# Patient Record
Sex: FEMALE | Race: WHITE | NOT HISPANIC OR LATINO | Employment: FULL TIME | URBAN - METROPOLITAN AREA
[De-identification: names, ages, dates, MRNs, and addresses within clinical notes are randomized per-mention and may not be internally consistent; named-entity substitution may affect disease eponyms.]

---

## 2017-05-02 ENCOUNTER — DOCUMENTATION ONLY (OUTPATIENT)
Dept: FAMILY MEDICINE | Facility: CLINIC | Age: 52
End: 2017-05-02

## 2017-05-02 NOTE — PROGRESS NOTES
Pre-Visit Chart Review  For Appointment Scheduled on 5/5/17.    Health Maintenance Due   Topic Date Due    Hepatitis C Screening  1965    TETANUS VACCINE  10/22/1983    Pap Smear  10/22/1986    Mammogram  10/22/2005    Colonoscopy  10/22/2015

## 2017-05-05 ENCOUNTER — OFFICE VISIT (OUTPATIENT)
Dept: FAMILY MEDICINE | Facility: CLINIC | Age: 52
End: 2017-05-05
Payer: COMMERCIAL

## 2017-05-05 VITALS
HEART RATE: 87 BPM | WEIGHT: 199.31 LBS | TEMPERATURE: 99 F | BODY MASS INDEX: 33.21 KG/M2 | DIASTOLIC BLOOD PRESSURE: 76 MMHG | HEIGHT: 65 IN | SYSTOLIC BLOOD PRESSURE: 131 MMHG

## 2017-05-05 DIAGNOSIS — R63.5 WEIGHT GAIN: ICD-10-CM

## 2017-05-05 DIAGNOSIS — I67.1 BRAIN ANEURYSM: Primary | ICD-10-CM

## 2017-05-05 DIAGNOSIS — R94.6 THYROID FUNCTION STUDY ABNORMALITY: ICD-10-CM

## 2017-05-05 DIAGNOSIS — G89.29 CHRONIC NONINTRACTABLE HEADACHE, UNSPECIFIED HEADACHE TYPE: ICD-10-CM

## 2017-05-05 DIAGNOSIS — R51.9 CHRONIC NONINTRACTABLE HEADACHE, UNSPECIFIED HEADACHE TYPE: ICD-10-CM

## 2017-05-05 DIAGNOSIS — E66.9 OBESITY, CLASS I, BMI 30-34.9: ICD-10-CM

## 2017-05-05 PROBLEM — Z82.49 FAMILY HISTORY OF PREMATURE CAD: Status: ACTIVE | Noted: 2017-05-05

## 2017-05-05 PROBLEM — M51.36 DDD (DEGENERATIVE DISC DISEASE), LUMBAR: Status: ACTIVE | Noted: 2017-05-05

## 2017-05-05 PROCEDURE — 99204 OFFICE O/P NEW MOD 45 MIN: CPT | Mod: S$GLB,,, | Performed by: FAMILY MEDICINE

## 2017-05-05 PROCEDURE — 99999 PR PBB SHADOW E&M-EST. PATIENT-LVL V: CPT | Mod: PBBFAC,,, | Performed by: FAMILY MEDICINE

## 2017-05-05 PROCEDURE — 1160F RVW MEDS BY RX/DR IN RCRD: CPT | Mod: S$GLB,,, | Performed by: FAMILY MEDICINE

## 2017-05-05 RX ORDER — CETIRIZINE HYDROCHLORIDE 10 MG/1
10 TABLET ORAL DAILY
COMMUNITY

## 2017-05-05 RX ORDER — IBUPROFEN 200 MG
600 TABLET ORAL 2 TIMES DAILY PRN
Status: ON HOLD | COMMUNITY
End: 2019-01-05 | Stop reason: HOSPADM

## 2017-05-05 NOTE — MR AVS SNAPSHOT
[facility]  [address]  [place] [address]  Phone: [phone]  Fax: [phone]                  Marsha Khalil   2017 2:00 PM   Office Visit    Description:  Female : 1965   Provider:  Lovely Garza MD   Department:  [facility]           Reason for Visit     Establish Care           Diagnoses this Visit        Comments    Brain aneurysm    -  Primary     Chronic nonintractable headache, unspecified headache type         Weight gain         ALMA (obstructive sleep apnea)         Thyroid function study abnormality         Family history of premature CAD                To Do List           Future Appointments        Provider Department Dept Phone    2017 4:30 PM [facility] US1 [facility] Ultrasound [phone]    5/10/2017 8:30 AM LAB, [facility] SAT [facility] Lab [phone]    2017 3:30 PM [facility] MRI1 300 LB LIMIT [facility] [phone]    2017 4:30 PM [facility] MRI1 300 LB LIMIT [facility] [phone]    10/20/2017 1:00 PM Nathan Phillips MD [facility] Endo/Diabetes [phone]      Goals (5 Years of Data)     None      [facility]     [facility] Nurse Care Line - 24/7 Assistance  Unless otherwise directed by your provider, please contact [facility], our nurse care line that is available for /7 assistance.     Registered nurses in the [facility] provide: appointment scheduling, clinical advisement, health education, and other advisory services.  Call: [phone] (toll free)               Medications           Message regarding Medications     Verify the changes and/or additions to your medication regime listed below are the same as discussed with your clinician today.  If any of these changes or additions are incorrect, please notify your healthcare provider.             Verify that the below list of medications is an accurate representation of the medications you  "are currently taking.  If none reported, the list may be blank. If incorrect, please contact your healthcare provider. Carry this list with you in case of emergency.           Current Medications     cetirizine (ZYRTEC) 10 MG tablet Take 10 mg by mouth once daily.    ibuprofen (ADVIL,MOTRIN) 200 MG tablet Take 600 mg by mouth 2 (two) times daily as needed for Pain.           Clinical Reference Information           Your Vitals Were     BP Pulse Temp Height Weight BMI    131/76 (BP Location: Right arm, Patient Position: Sitting, BP Method: Automatic) 87 98.6 °F (37 °C) (Oral) 5' 4.5" (1.638 m) 90.4 kg (199 lb 4.7 oz) 33.68 kg/m2      Blood Pressure          Most Recent Value    BP  131/76      Allergies as of 5/5/2017     No Known Allergies      Immunizations Administered on Date of Encounter - 5/5/2017     None      Orders Placed During Today's Visit      Normal Orders This Visit    Ambulatory referral to Endocrinology     Ambulatory referral to Neurology     Future Labs/Procedures Expected by Expires    Anti-thyroglobulin antibody  5/5/2017 7/4/2018    CBC auto differential  5/5/2017 7/4/2018    Comprehensive metabolic panel  5/5/2017 7/4/2018    Lipid panel  5/5/2017 7/4/2018    MRA Brain  5/5/2017 5/5/2018    MRI Brain W WO Contrast  5/5/2017 5/5/2018    T3  5/5/2017 7/4/2018    T4, free  5/5/2017 7/4/2018    Thyroglobulin  5/5/2017 7/4/2018    Thyroid peroxidase antibody  5/5/2017 7/4/2018    Thyroid stimulating immunoglobulin  5/5/2017 7/4/2018    TSH  5/5/2017 7/4/2018    US Soft Tissue Head Neck Thyroid  5/5/2017 5/6/2018      Smoking Cessation     If you would like to quit smoking:   You may be eligible for free services if you are a [place] resident and started smoking cigarettes before September 1, 1988.  Call the Smoking Cessation Trust (SCT) toll free at (663) 301-4418 or (342) 515-4060.   Call 2-800-QUIT-NOW if you do not meet the above criteria.   Contact us via email: " [email or web address]   View our website for more information: [email or web address]        Language Assistance Services     ATTENTION: Language assistance services are available, free of charge. Please call [phone].      ATENCIÓN: Si antoinette hernandez, tiene a damian disposición servicios gratuitos de asistencia lingüística. Llame al [phone].     CHÚ Ý: N?u b?n nói Ti?ng Vi?t, có các d?ch v? h? tr? ngôn ng? mi?n phí dành cho b?n. G?i s? [phone].         [facility] complies with applicable Federal civil rights laws and does not discriminate on the basis of race, color, national origin, age, disability, or sex.

## 2017-05-08 ENCOUNTER — HOSPITAL ENCOUNTER (OUTPATIENT)
Dept: RADIOLOGY | Facility: CLINIC | Age: 52
Discharge: HOME | End: 2017-05-08
Attending: FAMILY MEDICINE
Payer: COMMERCIAL

## 2017-05-08 DIAGNOSIS — R94.6 THYROID FUNCTION STUDY ABNORMALITY: ICD-10-CM

## 2017-05-08 PROCEDURE — 76536 US EXAM OF HEAD AND NECK: CPT | Mod: 26,,, | Performed by: RADIOLOGY

## 2017-05-08 PROCEDURE — 76536 US EXAM OF HEAD AND NECK: CPT | Mod: TC,PO

## 2017-05-08 NOTE — PROGRESS NOTES
CHIEF COMPLAINT:  Establish care      HISTORY OF PRESENT ILLNESS:  Marsha Khalil is a 51 y.o. female who presents to clinic as a new patient to me to establish care. She recently moved to the area from [place]. She states that she was undergoing evaluation of chronic headaches and a brain MRI/MRA demonstrated a 1 mm aneurysm. She was supposed to have follow up imaging in 6 months but this did not occur with her move. She continues to have headaches and had been on different prescription migraine medications with no relief. She also stats that she has a thyroid condition but that her TSH is always normal. She has not followed up with endocrinology. She also continues to have weight gain despite eating a healthy diet, getting regular exercise.       REVIEW OF SYSTEMS:  The patient denies any fever, chills, night sweats, vision changes, difficulty speaking or swallowing, decreased hearing, , chest pain, palpitations, shortness of breath, cough, nausea, vomiting, abdominal pain, dysuria, diarrhea, constipation, hematuria, hematochezia, melena, changes in her hair, skin, nails, numbness or weakness in her extremities, erythema, pain or swelling over any of her joints, myalgia, swollen glands, easy bruising, fatigue, edema, symptoms of anxiety or depression. She denies any vaginal discharge, breast masses, nipple discharge, change in the skin overlying her breasts.      MEDICATIONS:   Reviewed and/or reconciled in EPIC    ALLERGIES:  Reviewed and/or reconciled in [facility]    PAST MEDICAL/SURGICAL HISTORY:   Past Medical History:   Diagnosis Date    Allergy     Chest pain     negative stress test and heart cath    Headache     ALMA (obstructive sleep apnea)     Seasonal allergies       Past Surgical History:   Procedure Laterality Date    abscess removal from right side of neck      APPENDECTOMY      BREAST SURGERY Left     lumpectomy, benign    CARDIAC CATHETERIZATION      part of evaluation for chest pain,  "negative     SECTION      x1    ENDOMETRIAL ABLATION      TUBAL LIGATION         FAMILY HISTORY:    Family History   Problem Relation Age of Onset    Multiple sclerosis Father     Diabetes Father     Heart disease Father 50     CAD    Diabetes Mother     Cancer Mother      breast    Heart disease Mother 50     CAD    Thyroid disease Daughter      hypothyroidism    Transient ischemic attack Sister        SOCIAL HISTORY:    Social History     Social History    Marital status: Single     Spouse name: N/A    Number of children: N/A    Years of education: N/A     Occupational History    Not on file.     Social History Main Topics    Smoking status: Current Every Day Smoker     Packs/day: 1.00     Years: 30.00     Types: Cigarettes    Smokeless tobacco: Never Used    Alcohol use No    Drug use: No    Sexual activity: Yes     Partners: Male     Other Topics Concern    Not on file     Social History Narrative    No narrative on file       PHYSICAL EXAM:  VITAL SIGNS:   Vitals:    17 1415   BP: 131/76   BP Location: Right arm   Patient Position: Sitting   BP Method: Automatic   Pulse: 87   Temp: 98.6 °F (37 °C)   TempSrc: Oral   Weight: 90.4 kg (199 lb 4.7 oz)   Height: 5' 4.5" (1.638 m)     GENERAL:  Patient appears well nourished, sitting on exam table, in no acute distress.  HEENT:  Atraumatic, normocephalic, PERRLA, EOMI, no conjunctival injection, sclerae are anicteric, normal external auditory canals,TMs clear b/l, gross hearing intact to whisper, MMM, no oropharygneal erythema or exudate.  NECK:  Supple, normal ROM, trachea is midline , no supraclavicular or cervical LAD or masses palpated.  Thyroid gland not palpable.  CARDIOVASCULAR:  RRR, normal S1 and S2, no m/r/g.  RESPIRATORY:  CTA b/l, no wheezes, rhonchi, rales.  No increased work of breathing, no  use of accessory muscles.  ABDOMEN:  Soft, nontender, nondistended, normoactive bowel sounds in all four quadrants, no rebound " or guarding, no HSM or masses palpated.  Normal percussion.  EXTREMITIES:  2+ DP pulses b/l, no edema.  SKIN:  Warm, no lesions on exposed skin.  NEUROMUSCULAR:  Cranial nerves II-XII grossly intact.  Strength is 4+/5 over upper and lower extremity flexors/extensors b/l, 2+ biceps and patellar reflexes b/l. No clubbing or cyanosis of digits/nails.  Steady gait.  PSYCH:  Patient is alert and oriented to person, time, place. They are appropriately dressed and groomed. There is normal eye contact. Rate and tone of speech is normal. Normal insight, judgement. Normal thought content and process.     LABORATORY/IMAGING STUDIES: pending     ASSESSMENT/PLAN: This is a 51 y.o. female who presents to clinic for evaluation of the following concerns    1. Brain aneurysm  - MRI Brain W WO Contrast; Future  - MRA Brain; Future    2. Chronic nonintractable headache, unspecified headache type  - Ambulatory referral to Neurology    3. Weight gain  - CBC auto differential; Future  - Comprehensive metabolic panel; Future  - Lipid panel; Future  - TSH; Future  - Ambulatory referral to Endocrinology    4. Thyroid function study abnormality  - Ambulatory referral to Endocrinology  - T3; Future  - T4, free; Future  - Thyroglobulin; Future  - Anti-thyroglobulin antibody; Future  - Thyroid peroxidase antibody; Future  - Thyroid stimulating immunoglobulin; Future  - US Soft Tissue Head Neck Thyroid; Future    5. Obesity: see below      Patient readiness: acceptance and barriers:none    During the course of the visit the patient was educated and counseled about the following:     Obesity:   Refer to endocrinology    Goals: Obesity: Reduce calorie intake and BMI    Did patient meet goals/outcomes: No    The following self management tools provided: declined    Patient Instructions (the written plan) was given to the patient/family.     Time spent with patient: 30 minutes    Lovely Garza MD

## 2017-05-10 ENCOUNTER — LAB VISIT (OUTPATIENT)
Dept: LAB | Facility: HOSPITAL | Age: 52
End: 2017-05-10
Attending: FAMILY MEDICINE
Payer: COMMERCIAL

## 2017-05-10 DIAGNOSIS — R63.5 WEIGHT GAIN: ICD-10-CM

## 2017-05-10 DIAGNOSIS — R94.6 THYROID FUNCTION STUDY ABNORMALITY: ICD-10-CM

## 2017-05-10 LAB
ALBUMIN SERPL BCP-MCNC: 3.7 G/DL
ALP SERPL-CCNC: 56 U/L
ALT SERPL W/O P-5'-P-CCNC: 11 U/L
ANION GAP SERPL CALC-SCNC: 9 MMOL/L
AST SERPL-CCNC: 11 U/L
BASOPHILS # BLD AUTO: 0.03 K/UL
BASOPHILS NFR BLD: 0.3 %
BILIRUB SERPL-MCNC: 0.6 MG/DL
BUN SERPL-MCNC: 8 MG/DL
CALCIUM SERPL-MCNC: 9.5 MG/DL
CHLORIDE SERPL-SCNC: 107 MMOL/L
CHOLEST/HDLC SERPL: 5.3 {RATIO}
CO2 SERPL-SCNC: 22 MMOL/L
CREAT SERPL-MCNC: 0.7 MG/DL
DIFFERENTIAL METHOD: NORMAL
EOSINOPHIL # BLD AUTO: 0.2 K/UL
EOSINOPHIL NFR BLD: 1.7 %
ERYTHROCYTE [DISTWIDTH] IN BLOOD BY AUTOMATED COUNT: 13.7 %
EST. GFR  (AFRICAN AMERICAN): >60 ML/MIN/1.73 M^2
EST. GFR  (NON AFRICAN AMERICAN): >60 ML/MIN/1.73 M^2
GLUCOSE SERPL-MCNC: 91 MG/DL
HCT VFR BLD AUTO: 44.3 %
HDL/CHOLESTEROL RATIO: 18.8 %
HDLC SERPL-MCNC: 192 MG/DL
HDLC SERPL-MCNC: 36 MG/DL
HGB BLD-MCNC: 14.8 G/DL
LDLC SERPL CALC-MCNC: 138.2 MG/DL
LYMPHOCYTES # BLD AUTO: 4.2 K/UL
LYMPHOCYTES NFR BLD: 35.7 %
MCH RBC QN AUTO: 30.6 PG
MCHC RBC AUTO-ENTMCNC: 33.4 %
MCV RBC AUTO: 92 FL
MONOCYTES # BLD AUTO: 0.6 K/UL
MONOCYTES NFR BLD: 5.5 %
NEUTROPHILS # BLD AUTO: 6.6 K/UL
NEUTROPHILS NFR BLD: 56.5 %
NONHDLC SERPL-MCNC: 156 MG/DL
PLATELET # BLD AUTO: 322 K/UL
PMV BLD AUTO: 11 FL
POTASSIUM SERPL-SCNC: 4.2 MMOL/L
PROT SERPL-MCNC: 6.7 G/DL
RBC # BLD AUTO: 4.83 M/UL
SODIUM SERPL-SCNC: 138 MMOL/L
T3 SERPL-MCNC: 91 NG/DL
T4 FREE SERPL-MCNC: 1.07 NG/DL
THYROGLOB AB SERPL IA-ACNC: <4 IU/ML
THYROPEROXIDASE IGG SERPL-ACNC: <6 IU/ML
TRIGL SERPL-MCNC: 89 MG/DL
TSH SERPL DL<=0.005 MIU/L-ACNC: 0.83 UIU/ML
WBC # BLD AUTO: 11.65 K/UL

## 2017-05-10 PROCEDURE — 84443 ASSAY THYROID STIM HORMONE: CPT

## 2017-05-10 PROCEDURE — 85025 COMPLETE CBC W/AUTO DIFF WBC: CPT

## 2017-05-10 PROCEDURE — 84445 ASSAY OF TSI GLOBULIN: CPT

## 2017-05-10 PROCEDURE — 80053 COMPREHEN METABOLIC PANEL: CPT

## 2017-05-10 PROCEDURE — 80061 LIPID PANEL: CPT

## 2017-05-10 PROCEDURE — 84480 ASSAY TRIIODOTHYRONINE (T3): CPT

## 2017-05-10 PROCEDURE — 84439 ASSAY OF FREE THYROXINE: CPT

## 2017-05-10 PROCEDURE — 86800 THYROGLOBULIN ANTIBODY: CPT | Mod: 91

## 2017-05-10 PROCEDURE — 86800 THYROGLOBULIN ANTIBODY: CPT

## 2017-05-10 PROCEDURE — 86376 MICROSOMAL ANTIBODY EACH: CPT

## 2017-05-11 DIAGNOSIS — Z12.31 OTHER SCREENING MAMMOGRAM: ICD-10-CM

## 2017-05-11 DIAGNOSIS — Z11.59 NEED FOR HEPATITIS C SCREENING TEST: ICD-10-CM

## 2017-05-11 LAB
THRYOGLOBULIN INTERPRETATION: ABNORMAL
THYROGLOB AB SERPL-ACNC: <1.8 IU/ML
THYROGLOB SERPL-MCNC: 11 NG/ML

## 2017-05-12 ENCOUNTER — HOSPITAL ENCOUNTER (OUTPATIENT)
Dept: RADIOLOGY | Facility: HOSPITAL | Age: 52
Discharge: HOME | End: 2017-05-12
Attending: FAMILY MEDICINE
Payer: COMMERCIAL

## 2017-05-12 DIAGNOSIS — I67.1 BRAIN ANEURYSM: ICD-10-CM

## 2017-05-12 LAB — TSI SER-ACNC: <0.1 IU/L

## 2017-05-12 PROCEDURE — 70553 MRI BRAIN STEM W/O & W/DYE: CPT | Mod: 26,,, | Performed by: RADIOLOGY

## 2017-05-12 PROCEDURE — 25500020 PHARM REV CODE 255: Performed by: FAMILY MEDICINE

## 2017-05-12 PROCEDURE — A9585 GADOBUTROL INJECTION: HCPCS | Performed by: FAMILY MEDICINE

## 2017-05-12 PROCEDURE — 70544 MR ANGIOGRAPHY HEAD W/O DYE: CPT | Mod: 26,,, | Performed by: RADIOLOGY

## 2017-05-12 PROCEDURE — 70553 MRI BRAIN STEM W/O & W/DYE: CPT | Mod: TC

## 2017-05-12 PROCEDURE — 70544 MR ANGIOGRAPHY HEAD W/O DYE: CPT | Mod: TC

## 2017-05-12 RX ORDER — GADOBUTROL 604.72 MG/ML
INJECTION INTRAVENOUS
Status: DISCONTINUED
Start: 2017-05-12 | End: 2017-05-13 | Stop reason: HOSPADM

## 2017-05-12 RX ORDER — GADOBUTROL 604.72 MG/ML
9 INJECTION INTRAVENOUS
Status: COMPLETED | OUTPATIENT
Start: 2017-05-12 | End: 2017-05-12

## 2017-05-12 RX ADMIN — GADOBUTROL 9 ML: 604.72 INJECTION INTRAVENOUS at 04:05

## 2017-05-23 ENCOUNTER — PATIENT MESSAGE (OUTPATIENT)
Dept: FAMILY MEDICINE | Facility: CLINIC | Age: 52
End: 2017-05-23

## 2017-05-23 DIAGNOSIS — E04.2 MULTIPLE THYROID NODULES: ICD-10-CM

## 2017-05-23 NOTE — TELEPHONE ENCOUNTER
Her lab work including thyroid studies was normal. The thyroid ultrasound demonstrated an enlarged thyroid gland with multiple nodules none of which need to be biopsied at this time. She needs to keep her appointment with Dr. Phillips.    Her brain MRI/MRA did not demonstrate any aneurysm, there are some changes that can be seen due to age, cholesterol deposits but no other abnormalities. Should try to eat low fat diet, but does not need to be on cholesterol medication at this time.    She has an appointment with Dr. Fischer in August, is there a sooner appointment? Does she want to see someone at [facility] or Dr. Hilario, Dr. Howard's group?

## 2017-07-04 ENCOUNTER — HOSPITAL ENCOUNTER (EMERGENCY)
Facility: HOSPITAL | Age: 52
Discharge: HOME | End: 2017-07-04
Attending: EMERGENCY MEDICINE
Payer: COMMERCIAL

## 2017-07-04 VITALS
HEART RATE: 89 BPM | SYSTOLIC BLOOD PRESSURE: 121 MMHG | RESPIRATION RATE: 22 BRPM | TEMPERATURE: 97 F | WEIGHT: 190 LBS | BODY MASS INDEX: 31.65 KG/M2 | OXYGEN SATURATION: 96 % | DIASTOLIC BLOOD PRESSURE: 56 MMHG | HEIGHT: 65 IN

## 2017-07-04 DIAGNOSIS — N23 RENAL COLIC: Primary | ICD-10-CM

## 2017-07-04 LAB
ALBUMIN SERPL BCP-MCNC: 3.9 G/DL
ALP SERPL-CCNC: 51 U/L
ALT SERPL W/O P-5'-P-CCNC: 14 U/L
ANION GAP SERPL CALC-SCNC: 9 MMOL/L
AST SERPL-CCNC: 14 U/L
BASOPHILS # BLD AUTO: 0 K/UL
BASOPHILS NFR BLD: 0.3 %
BILIRUB SERPL-MCNC: 0.5 MG/DL
BILIRUB UR QL STRIP: NEGATIVE
BUN SERPL-MCNC: 14 MG/DL
CALCIUM SERPL-MCNC: 9.4 MG/DL
CHLORIDE SERPL-SCNC: 107 MMOL/L
CLARITY UR: CLEAR
CO2 SERPL-SCNC: 22 MMOL/L
COLOR UR: YELLOW
CREAT SERPL-MCNC: 0.8 MG/DL
DIFFERENTIAL METHOD: ABNORMAL
EOSINOPHIL # BLD AUTO: 0.2 K/UL
EOSINOPHIL NFR BLD: 1.2 %
ERYTHROCYTE [DISTWIDTH] IN BLOOD BY AUTOMATED COUNT: 13.1 %
EST. GFR  (AFRICAN AMERICAN): >60 ML/MIN/1.73 M^2
EST. GFR  (NON AFRICAN AMERICAN): >60 ML/MIN/1.73 M^2
GLUCOSE SERPL-MCNC: 130 MG/DL
GLUCOSE UR QL STRIP: NEGATIVE
HCT VFR BLD AUTO: 41.5 %
HGB BLD-MCNC: 14.5 G/DL
HGB UR QL STRIP: NEGATIVE
KETONES UR QL STRIP: NEGATIVE
LEUKOCYTE ESTERASE UR QL STRIP: NEGATIVE
LYMPHOCYTES # BLD AUTO: 4.2 K/UL
LYMPHOCYTES NFR BLD: 28.4 %
MCH RBC QN AUTO: 31.3 PG
MCHC RBC AUTO-ENTMCNC: 34.8 %
MCV RBC AUTO: 90 FL
MONOCYTES # BLD AUTO: 0.7 K/UL
MONOCYTES NFR BLD: 4.9 %
NEUTROPHILS # BLD AUTO: 9.8 K/UL
NEUTROPHILS NFR BLD: 65.2 %
NITRITE UR QL STRIP: NEGATIVE
PH UR STRIP: 6 [PH] (ref 5–8)
PLATELET # BLD AUTO: 342 K/UL
PMV BLD AUTO: 7.9 FL
POTASSIUM SERPL-SCNC: 3.9 MMOL/L
PROT SERPL-MCNC: 6.9 G/DL
PROT UR QL STRIP: NEGATIVE
RBC # BLD AUTO: 4.63 M/UL
SODIUM SERPL-SCNC: 138 MMOL/L
SP GR UR STRIP: 1.02 (ref 1–1.03)
URN SPEC COLLECT METH UR: NORMAL
UROBILINOGEN UR STRIP-ACNC: NEGATIVE EU/DL
WBC # BLD AUTO: 14.9 K/UL

## 2017-07-04 PROCEDURE — 99285 EMERGENCY DEPT VISIT HI MDM: CPT | Mod: 25

## 2017-07-04 PROCEDURE — 36415 COLL VENOUS BLD VENIPUNCTURE: CPT

## 2017-07-04 PROCEDURE — 81003 URINALYSIS AUTO W/O SCOPE: CPT

## 2017-07-04 PROCEDURE — 96375 TX/PRO/DX INJ NEW DRUG ADDON: CPT

## 2017-07-04 PROCEDURE — 96361 HYDRATE IV INFUSION ADD-ON: CPT

## 2017-07-04 PROCEDURE — 85025 COMPLETE CBC W/AUTO DIFF WBC: CPT

## 2017-07-04 PROCEDURE — 80053 COMPREHEN METABOLIC PANEL: CPT

## 2017-07-04 PROCEDURE — 63600175 PHARM REV CODE 636 W HCPCS: Performed by: EMERGENCY MEDICINE

## 2017-07-04 PROCEDURE — 96374 THER/PROPH/DIAG INJ IV PUSH: CPT

## 2017-07-04 PROCEDURE — 25000003 PHARM REV CODE 250: Performed by: EMERGENCY MEDICINE

## 2017-07-04 RX ORDER — MORPHINE SULFATE 4 MG/ML
8 INJECTION, SOLUTION INTRAMUSCULAR; INTRAVENOUS
Status: COMPLETED | OUTPATIENT
Start: 2017-07-04 | End: 2017-07-04

## 2017-07-04 RX ORDER — KETOROLAC TROMETHAMINE 30 MG/ML
15 INJECTION, SOLUTION INTRAMUSCULAR; INTRAVENOUS
Status: COMPLETED | OUTPATIENT
Start: 2017-07-04 | End: 2017-07-04

## 2017-07-04 RX ORDER — HYDROCODONE BITARTRATE AND ACETAMINOPHEN 5; 325 MG/1; MG/1
1 TABLET ORAL EVERY 4 HOURS PRN
Qty: 8 TABLET | Refills: 0 | Status: SHIPPED | OUTPATIENT
Start: 2017-07-04 | End: 2017-07-14

## 2017-07-04 RX ORDER — ONDANSETRON 4 MG/1
4 TABLET, ORALLY DISINTEGRATING ORAL EVERY 8 HOURS PRN
Qty: 12 TABLET | Refills: 0 | Status: SHIPPED | OUTPATIENT
Start: 2017-07-04 | End: 2017-08-29

## 2017-07-04 RX ORDER — KETOROLAC TROMETHAMINE 10 MG/1
10 TABLET, FILM COATED ORAL 3 TIMES DAILY PRN
Qty: 12 TABLET | Refills: 0 | Status: SHIPPED | OUTPATIENT
Start: 2017-07-04 | End: 2017-08-29

## 2017-07-04 RX ADMIN — SODIUM CHLORIDE 1000 ML: 0.9 INJECTION, SOLUTION INTRAVENOUS at 01:07

## 2017-07-04 RX ADMIN — MORPHINE SULFATE 8 MG: 4 INJECTION INTRAVENOUS at 01:07

## 2017-07-04 RX ADMIN — KETOROLAC TROMETHAMINE 15 MG: 30 INJECTION, SOLUTION INTRAMUSCULAR at 03:07

## 2017-07-04 NOTE — ED PROVIDER NOTES
Encounter Date: 2017       History     Chief Complaint   Patient presents with    Flank Pain     EMS admin 4mg zofran and establishe 18g IV     51-year-old female medical history of an endometrial ablation and appendectomy presents with 1 hour of sudden onset left flank pain radiating into the left groin with nausea and vomiting.  No right-sided pain.  No aggravating or alleviating factors, EMS has given the patient Zofran.          Review of patient's allergies indicates:  No Known Allergies  Past Medical History:   Diagnosis Date    Allergy     Chest pain     negative stress test and heart cath    Headache     ALMA (obstructive sleep apnea)     Seasonal allergies      Past Surgical History:   Procedure Laterality Date    abscess removal from right side of neck      APPENDECTOMY      BREAST SURGERY Left     lumpectomy, benign    CARDIAC CATHETERIZATION      part of evaluation for chest pain, negative     SECTION      x1    ENDOMETRIAL ABLATION      TUBAL LIGATION       Family History   Problem Relation Age of Onset    Multiple sclerosis Father     Diabetes Father     Heart disease Father 50     CAD    Diabetes Mother     Cancer Mother      breast    Heart disease Mother 50     CAD    Thyroid disease Daughter      hypothyroidism    Transient ischemic attack Sister      Social History   Substance Use Topics    Smoking status: Current Every Day Smoker     Packs/day: 1.00     Years: 30.00     Types: Cigarettes    Smokeless tobacco: Never Used    Alcohol use No     Review of Systems   Constitutional: Negative.    HENT: Negative.    Respiratory: Negative.    Cardiovascular: Negative.    Gastrointestinal: Positive for abdominal pain, nausea and vomiting.   Genitourinary: Positive for flank pain.   All other systems reviewed and are negative.      Physical Exam     Initial Vitals [17 0139]   BP Pulse Resp Temp SpO2   (!) 148/67 75 (!) 22 97 °F (36.1 °C) 99 %      MAP       94          Physical Exam    Nursing note and vitals reviewed.  Constitutional: She appears well-developed and well-nourished. She appears distressed.   Appears uncomfortable   HENT:   Head: Normocephalic and atraumatic.   Eyes: EOM are normal.   Neck: Normal range of motion. Neck supple.   Cardiovascular: Normal rate, regular rhythm and normal heart sounds.   Pulmonary/Chest: Breath sounds normal. No respiratory distress.   Abdominal: She exhibits no distension. There is tenderness. There is no rebound and no guarding.   Mild left-sided abdominal tenderness   Musculoskeletal: Normal range of motion.   Neurological: She is alert and oriented to person, place, and time.   Skin: Skin is warm and dry.   Psychiatric: She has a normal mood and affect. Her behavior is normal. Judgment and thought content normal.         ED Course   Procedures  Labs Reviewed   CBC W/ AUTO DIFFERENTIAL   COMPREHENSIVE METABOLIC PANEL   URINALYSIS             Medical Decision Making:   Clinical Tests:   Lab Tests: Reviewed and Ordered  Radiological Study: Reviewed and Ordered                   ED Course   Comment By Time   IMPRESSION:  Low-grade obstructing 2 mm left UVJ stone; see above discussion.  Thank you for allowing us to participate in the care of your patient.  Dictated and Authenticated by: Kash Stanford MD  07/04/2017 3:03 AM Central Time ([place] & [place]) Angel Caldwell MD 07/04 8591   Complete relief this time. Angel Caldwell MD 07/04 1730     Clinical Impression:   The encounter diagnosis was Renal colic.          51-year-old presents with a sudden onset of flank pain, CT scan demonstrates left UPJ.  Pain is completely resolved at this time.  Labs demonstrated no renal failure or urinalysis consistent with an infected stone.  Patient will be discharged home with nausea medication and pain meds.  Call primary care in 2 days for further guidance.  Return if symptoms persist or worsen.                 Angel Caldwell MD  07/04/17  1175

## 2017-07-04 NOTE — ED NOTES
Pt presents with sudden onset of acute pain on left side of abdomen and left flank area. Pt rolling around in the bed crying with pain. No history of kidney stones. No nausea or vomiting. Abdomen soft and non distended. Pt alert and oriented with neuro intact.

## 2017-07-06 ENCOUNTER — TELEPHONE (OUTPATIENT)
Dept: FAMILY MEDICINE | Facility: CLINIC | Age: 52
End: 2017-07-06

## 2017-07-06 NOTE — TELEPHONE ENCOUNTER
----- Message from Josephine Fortune sent at 7/6/2017  8:31 AM CDT -----  Contact: self  Patient called regarding scheduling a ER f/u from [facility]. Please contact [phone] (qhoy)

## 2017-07-12 ENCOUNTER — DOCUMENTATION ONLY (OUTPATIENT)
Dept: FAMILY MEDICINE | Facility: CLINIC | Age: 52
End: 2017-07-12

## 2017-07-12 NOTE — PROGRESS NOTES
Pre-Visit Chart Review  For Appointment Scheduled on 7/13/17.    Health Maintenance Due   Topic Date Due    Hepatitis C Screening  1965    TETANUS VACCINE  10/22/1983    Pneumococcal PPSV23 (Medium Risk) (1) 10/22/1983    Pap Smear with HPV Cotest  10/22/1986    Mammogram  10/22/2005    Colonoscopy  10/22/2015

## 2017-07-13 ENCOUNTER — OFFICE VISIT (OUTPATIENT)
Dept: FAMILY MEDICINE | Facility: CLINIC | Age: 52
End: 2017-07-13
Payer: COMMERCIAL

## 2017-07-13 ENCOUNTER — LAB VISIT (OUTPATIENT)
Dept: LAB | Facility: HOSPITAL | Age: 52
End: 2017-07-13
Attending: FAMILY MEDICINE
Payer: COMMERCIAL

## 2017-07-13 VITALS
DIASTOLIC BLOOD PRESSURE: 73 MMHG | WEIGHT: 199.06 LBS | HEART RATE: 83 BPM | TEMPERATURE: 98 F | HEIGHT: 65 IN | BODY MASS INDEX: 33.16 KG/M2 | SYSTOLIC BLOOD PRESSURE: 134 MMHG

## 2017-07-13 DIAGNOSIS — N20.0 NEPHROLITHIASIS: Primary | ICD-10-CM

## 2017-07-13 DIAGNOSIS — D72.829 LEUKOCYTOSIS, UNSPECIFIED TYPE: ICD-10-CM

## 2017-07-13 DIAGNOSIS — L84 CORN OR CALLUS: ICD-10-CM

## 2017-07-13 DIAGNOSIS — R73.9 HYPERGLYCEMIA: ICD-10-CM

## 2017-07-13 DIAGNOSIS — N23 RENAL COLIC ON LEFT SIDE: ICD-10-CM

## 2017-07-13 DIAGNOSIS — N20.0 NEPHROLITHIASIS: ICD-10-CM

## 2017-07-13 LAB
ANION GAP SERPL CALC-SCNC: 8 MMOL/L
BASOPHILS # BLD AUTO: 0.05 K/UL
BASOPHILS NFR BLD: 0.5 %
BUN SERPL-MCNC: 11 MG/DL
CALCIUM SERPL-MCNC: 9.6 MG/DL
CHLORIDE SERPL-SCNC: 105 MMOL/L
CO2 SERPL-SCNC: 26 MMOL/L
CREAT SERPL-MCNC: 0.7 MG/DL
DIFFERENTIAL METHOD: ABNORMAL
EOSINOPHIL # BLD AUTO: 0.2 K/UL
EOSINOPHIL NFR BLD: 1.5 %
ERYTHROCYTE [DISTWIDTH] IN BLOOD BY AUTOMATED COUNT: 13.4 %
EST. GFR  (AFRICAN AMERICAN): >60 ML/MIN/1.73 M^2
EST. GFR  (NON AFRICAN AMERICAN): >60 ML/MIN/1.73 M^2
GLUCOSE SERPL-MCNC: 94 MG/DL
HCT VFR BLD AUTO: 43.3 %
HGB BLD-MCNC: 15 G/DL
LYMPHOCYTES # BLD AUTO: 4.8 K/UL
LYMPHOCYTES NFR BLD: 44.9 %
MCH RBC QN AUTO: 31.3 PG
MCHC RBC AUTO-ENTMCNC: 34.6 %
MCV RBC AUTO: 90 FL
MONOCYTES # BLD AUTO: 0.7 K/UL
MONOCYTES NFR BLD: 6.8 %
NEUTROPHILS # BLD AUTO: 4.9 K/UL
NEUTROPHILS NFR BLD: 46 %
PLATELET # BLD AUTO: 378 K/UL
PMV BLD AUTO: 10.6 FL
POTASSIUM SERPL-SCNC: 4.2 MMOL/L
RBC # BLD AUTO: 4.79 M/UL
SODIUM SERPL-SCNC: 139 MMOL/L
WBC # BLD AUTO: 10.69 K/UL

## 2017-07-13 PROCEDURE — 85025 COMPLETE CBC W/AUTO DIFF WBC: CPT

## 2017-07-13 PROCEDURE — 99213 OFFICE O/P EST LOW 20 MIN: CPT | Mod: S$GLB,,, | Performed by: PHYSICIAN ASSISTANT

## 2017-07-13 PROCEDURE — 36415 COLL VENOUS BLD VENIPUNCTURE: CPT | Mod: PO

## 2017-07-13 PROCEDURE — 99999 PR PBB SHADOW E&M-EST. PATIENT-LVL IV: CPT | Mod: PBBFAC,,, | Performed by: PHYSICIAN ASSISTANT

## 2017-07-13 PROCEDURE — 80048 BASIC METABOLIC PNL TOTAL CA: CPT

## 2017-07-13 RX ORDER — UREA 40 %
CREAM (GRAM) TOPICAL 2 TIMES DAILY
Qty: 85 G | Refills: 0 | Status: SHIPPED | OUTPATIENT
Start: 2017-07-13 | End: 2017-08-29

## 2017-07-13 NOTE — PATIENT INSTRUCTIONS
Weight Management: Getting Started  Healthy bodies come in all shapes and sizes. Not all bodies are made to be thin. For some people, a healthy weight is higher than the average weight listed on weight charts. Your healthcare provider can help you decide on a healthy weight for you.    Reasons to lose weight  Losing weight can help with some health problems, such as high blood pressure, heart disease, diabetes, sleep apnea, and arthritis. You may also feel more energy.  Set your long-term goal  Your goal doesn't even have to be a specific weight. You may decide on a fitness goal (such as being able to walk 10 miles a week), or a health goal (such as lowering your blood pressure). Choose a goal that is measurable and reasonable, so you know when you've reached it. A goal of reaching a BMI of less than 25 is not always reasonable (or possible).   Make an action plan  Habits dont change overnight. Setting your goals too high can leave you feeling discouraged if you cant reach them. Be realistic. Choose one or two small changes you can make now. Set an action plan for how you are going to make these changes. When you can stick to this plan, keep making a few more small changes. Taking small steps will help you stay on the path to success.  Track your progress  Write down your goals. Then, keep a daily record of your progress. Write down what you eat and how active you are. This record lets you look back on how much youve done. It may also help when youre feeling frustrated. Reward yourself for success. Even if you dont reach every goal, give yourself credit for what you do get done.  Get support  Encouragement from others can help make losing weight easier. Ask your family members and friends for support. They may even want to join you. Also look to your healthcare provider, registered dietitian, and  for help. Your local hospital can give you more information about nutrition, exercise, and  weight loss.  Date Last Reviewed: 1/31/2016 © 2000-2016 [employer]. [address] [place] [address], [place] [address]. All rights reserved. This information is not intended as a substitute for professional medical care. Always follow your healthcare professional's instructions.        Walking for Fitness  Fitness walking has something for everyone, even people who are already fit. Walking is one of the safest ways to condition your body aerobically. It can boost energy, help you lose weight, and reduce stress.    Physical benefits  · Walking strengthens your heart and lungs, and tones your muscles.  · When walking, your feet land with less impact than in other sports. This reduces chances of muscle, bone, and joint injury.  · Regular walking improves your cholesterol levels and lowers your risk of heart disease. And it helps you control your blood sugar if you have diabetes.  · Walking is a weight-bearing activity, which helps maintain bone density. This can help prevent osteoporosis.  Personal rewards  · Taking walks can help you relax and manage stress. And fitness walking may make you feel better about yourself.  · Walking can help you sleep better at night and make you less likely to be depressed.  · Regular walking may help maintain your memory as you get older.  · Walking is a great way to spend extra time with friends and family members. Be sure to invite your dog along!  Q&A about fitness walking  Q: Will walking keep me fit?  A: Yes. Regular walking at the right pace gives you all the benefits of other aerobic activities, such as jogging and swimming.  Q: Will walking help me lose weight and keep it off?  A: Yes. Per mile, walking can burn as many calories as jogging. Your health care provider can help work walking into your weight-loss plan.  Q: Is walking safe for my health?  A: Yes. Walking is safe if you have high blood pressure, diabetes, heart disease, or other conditions. Talk to your health  care provider before you start.  Date Last Reviewed: 5/9/2015  © 4988-3233 [employer]. [address] [place] [address], [place] [address]. All rights reserved. This information is not intended as a substitute for professional medical care. Always follow your healthcare professional's instructions.

## 2017-07-13 NOTE — PROGRESS NOTES
Subjective:       Patient ID: Marsha Khalil is a 51 y.o. female.    Chief Complaint: Follow-up    Patient presents for ER follow up.  She was seen and treated in ER for renal colic and left sided kidney stone.  She since discharge she is feeling much better.  She had one episode of painful urination and she then she is urinating normally.  She did not visualize the stones but feels that she passed it due to the episode of discomfort.  She does not have a history of stones.   In ER her WBC was slightly elevated. UA was normal.  Her glucose was 130.      She is complaining of a painful callous on the left heel.  She has tried to scrub the callous while showering.  The callous is increasing in size        Review of Systems   Constitutional: Negative for activity change and unexpected weight change.   HENT: Negative for hearing loss, rhinorrhea and trouble swallowing.    Eyes: Negative for discharge and visual disturbance.   Respiratory: Negative for chest tightness and wheezing.    Cardiovascular: Negative for chest pain and palpitations.   Gastrointestinal: Negative for blood in stool, constipation, diarrhea and vomiting.   Endocrine: Negative for polydipsia and polyuria.   Genitourinary: Negative for difficulty urinating, dysuria, hematuria and menstrual problem.   Musculoskeletal: Negative for arthralgias, joint swelling and neck pain.   Neurological: Positive for headaches. Negative for weakness.   Psychiatric/Behavioral: Negative for confusion and dysphoric mood.       Objective:      Physical Exam   Constitutional: She appears well-developed and well-nourished. She is cooperative. No distress.   Eyes: Conjunctivae and EOM are normal. Pupils are equal, round, and reactive to light. No scleral icterus.   Cardiovascular: Normal rate, regular rhythm and normal heart sounds.    Pulmonary/Chest: Effort normal and breath sounds normal.   Abdominal: Soft. Normal appearance and bowel sounds are normal. There is no  tenderness. There is no CVA tenderness.   Musculoskeletal:        Right lower leg: She exhibits no edema.        Left lower leg: She exhibits no edema.        Feet:    Neurological: She is alert.   Skin: Skin is warm and dry.   Psychiatric: She has a normal mood and affect. Her speech is normal. Judgment normal. Cognition and memory are normal.   Vitals reviewed.      Assessment:       1. Nephrolithiasis    2. Renal colic on left side, resolved     3. Leukocytosis, unspecified type    4. Corn or callus    5. Hyperglycemia        Plan:     Marsha was seen today for follow-up.    Diagnoses and all orders for this visit:    Nephrolithiasis  -     CBC auto differential; Future  -     Basic metabolic panel; Future    Renal colic on left side, resolved   -     CBC auto differential; Future  -     Basic metabolic panel; Future    Leukocytosis, unspecified type  -     CBC auto differential; Future  -     Basic metabolic panel; Future    [place] or callus  -     Ambulatory referral to Podiatry  -     urea (CARMOL) 40 % Crea; Apply topically 2 (two) times daily.    Hyperglycemia  -     Hemoglobin A1c; Future    Continue to increase water intake   Further recommendations will be made based on results

## 2017-07-14 ENCOUNTER — TELEPHONE (OUTPATIENT)
Dept: FAMILY MEDICINE | Facility: CLINIC | Age: 52
End: 2017-07-14

## 2017-07-14 NOTE — TELEPHONE ENCOUNTER
----- Message from VIVIAN Rahman sent at 7/14/2017  6:35 AM CDT -----  Repeat lab in acceptable range

## 2017-07-14 NOTE — TELEPHONE ENCOUNTER
----- Message from Cat Cross sent at 7/14/2017  1:47 PM CDT -----  Contact: pt  Results  Call back on # [phone]  thanks

## 2017-08-24 DIAGNOSIS — Z12.11 COLON CANCER SCREENING: ICD-10-CM

## 2017-08-29 ENCOUNTER — OFFICE VISIT (OUTPATIENT)
Dept: NEUROLOGY | Facility: CLINIC | Age: 52
End: 2017-08-29
Payer: COMMERCIAL

## 2017-08-29 VITALS
WEIGHT: 201.5 LBS | SYSTOLIC BLOOD PRESSURE: 119 MMHG | DIASTOLIC BLOOD PRESSURE: 81 MMHG | HEART RATE: 77 BPM | RESPIRATION RATE: 17 BRPM | HEIGHT: 65 IN | BODY MASS INDEX: 33.57 KG/M2

## 2017-08-29 DIAGNOSIS — G43.119 INTRACTABLE MIGRAINE WITH AURA WITHOUT STATUS MIGRAINOSUS: Primary | ICD-10-CM

## 2017-08-29 DIAGNOSIS — G47.33 OSA (OBSTRUCTIVE SLEEP APNEA): ICD-10-CM

## 2017-08-29 DIAGNOSIS — G43.009 MIGRAINE WITHOUT AURA, WITHOUT MENTION OF INTRACTABLE MIGRAINE WITHOUT MENTION OF STATUS MIGRAINOSUS: ICD-10-CM

## 2017-08-29 PROCEDURE — 99204 OFFICE O/P NEW MOD 45 MIN: CPT | Mod: S$GLB,,, | Performed by: PSYCHIATRY & NEUROLOGY

## 2017-08-29 PROCEDURE — 99999 PR PBB SHADOW E&M-EST. PATIENT-LVL III: CPT | Mod: PBBFAC,,, | Performed by: PSYCHIATRY & NEUROLOGY

## 2017-08-29 PROCEDURE — 3008F BODY MASS INDEX DOCD: CPT | Mod: S$GLB,,, | Performed by: PSYCHIATRY & NEUROLOGY

## 2017-08-29 RX ORDER — BUTALBITAL, ASPIRIN, AND CAFFEINE 325; 50; 40 MG/1; MG/1; MG/1
1 CAPSULE ORAL EVERY 6 HOURS PRN
Qty: 12 CAPSULE | Refills: 2 | Status: SHIPPED | OUTPATIENT
Start: 2017-08-29 | End: 2017-09-28

## 2017-08-29 RX ORDER — LAMOTRIGINE 25 MG/1
TABLET ORAL
Qty: 120 TABLET | Refills: 11 | Status: SHIPPED | OUTPATIENT
Start: 2017-08-29 | End: 2017-11-30 | Stop reason: SDUPTHER

## 2017-08-29 RX ORDER — SUMATRIPTAN 50 MG/1
TABLET, FILM COATED ORAL
Qty: 9 TABLET | Refills: 3 | Status: SHIPPED | OUTPATIENT
Start: 2017-08-29 | End: 2018-03-14 | Stop reason: SDUPTHER

## 2017-08-29 NOTE — PROGRESS NOTES
Subjective:       Patient ID: Marsha Khalil is a 51 y.o. female.    Chief Complaint: Headaches     HPI  The patient is a pleasant 52 y/o female presenting with chief complaint of headache. They started after the birth of her last child 18 years ago. The location varies and migrates from one side to another, posterior or anterior or pancephalic. She has never taken preventive medication or migraine specific medications. She treats with tylenol or NSAIDs with partial relief. The duration varied from 3 to 4 hours to 3 to 4 days. She is more worried about episodes consisting of scintillating scotoma, described as a typical aura followed by a severe headache. She has had an ECHO in the past but not with bubble contrast. She has had a recent MRI of the brain which was negative, revealing microischemic changes as expected for her age. Her father suffered from MS. She has undergone extensive work up for MS with negative results.  Please see details of headache characteristics below.  Headache questionnaire     1. When did your Headaches start?                         1999 with the birth of last child        2. Where are your headaches located?                        Both sides around the back of neck         3. Your headache's characteristics:                        Excruciating, Pressure, Throbbing, Pounding, Sharp        4. How long does the headache last?                        days        5. How often does the headache occur?                        0 varies         6. Are your headaches preceded or accompanied by other symptoms? yes                        If yes, please describe.  Blurry vision kaleidoscope affect , peripheral vision disappears         7. Does the headache awaken you at night? yes                        If so, how often? Once or twice per month            8. Please bev the word that best describes your headache's intensity:                         severe        9. Using a scale of 1 through 10, with  0 = no pain and 10 = the worst pain:                        What score is your headache now? 2                        What score is your headache at its worst? 10                        What score is your headache at its best? 2           10. Possible associated headache symptoms:  [x]  Sensitivity to light                                      [] Dizziness                                        [] Nasal or sinus pressure/ pain                        [x] Sensitivity to noise                                     [] Vertigo                                            [x] Problems with concentration  [] Sensitivity to smells             [] Ringing in ears                     [] Problems with memory                                            [x] Blurred vision                                 [x] Irritability                                         [x] Problems with task completion   [] Double vision                                 [] Anger                                  [x]  Problems with relaxation  [] Loss of appetite                                         [] Anxiety                                           [x] Neck tightness, Neck pain  [x] Nausea                                                                 [] Nasal congestion  [] Vomiting                                                                       11. Headache improving factors:  [] Sleep                                  [x] Heat  [x] Darkness                                        [x] Ice  [] Local pressure                     [] Menses (period)  [x] Massage                                        [x] Medications:  advil and tylenol        12. Headache worsening factors:   [] Fatigue                     [] Sneezing                                        [x] Changes in Weather  [x] Light             [x] Bending Over           [x] Stress  [] Noise            [] Ovulation                                        [] Multiple Sclerosis Flare-Up  [] Smells                       [] Menses                                          [] Food   [x] Coughing                  [] Alcohol        13. Number of caffeinated drinks per day: 2        14. Number of diet drinks per day:  0     Review of Systems   Constitutional: Positive for fatigue. Negative for activity change, appetite change and fever.   HENT: Negative for congestion, dental problem, hearing loss, sinus pressure, tinnitus, trouble swallowing and voice change.    Eyes: Negative for photophobia, pain, redness and visual disturbance (visual aura).   Respiratory: Negative for cough, chest tightness and shortness of breath.    Cardiovascular: Negative for chest pain, palpitations and leg swelling.   Gastrointestinal: Positive for nausea. Negative for abdominal pain, blood in stool and vomiting.   Endocrine: Positive for cold intolerance. Negative for heat intolerance.   Genitourinary: Negative for difficulty urinating, frequency, menstrual problem and urgency.   Musculoskeletal: Negative for arthralgias, back pain, gait problem, joint swelling, myalgias, neck pain and neck stiffness.   Skin: Negative.    Neurological: Negative for dizziness, tremors, seizures, syncope, facial asymmetry, speech difficulty, weakness, light-headedness, numbness and headaches.   Hematological: Negative for adenopathy. Does not bruise/bleed easily.   Psychiatric/Behavioral: Negative for agitation, behavioral problems, confusion, decreased concentration, self-injury, sleep disturbance and suicidal ideas. The patient is not nervous/anxious and is not hyperactive.          Past Medical History:   Diagnosis Date    Allergy     Chest pain     negative stress test and heart cath    Headache     ALMA (obstructive sleep apnea)     Seasonal allergies      Past Surgical History:   Procedure Laterality Date    abscess removal from right side of neck      APPENDECTOMY      BREAST SURGERY Left     lumpectomy, benign    CARDIAC CATHETERIZATION       part of evaluation for chest pain, negative     SECTION      x1    ENDOMETRIAL ABLATION      TUBAL LIGATION       Family History   Problem Relation Age of Onset    Multiple sclerosis Father     Diabetes Father     Heart disease Father 50     CAD    Diabetes Mother     Cancer Mother      breast    Heart disease Mother 50     CAD    Thyroid disease Daughter      hypothyroidism    Transient ischemic attack Sister      Social History     Social History    Marital status: Significant Other     Spouse name: N/A    Number of children: N/A    Years of education: N/A     Occupational History    Not on file.     Social History Main Topics    Smoking status: Current Every Day Smoker     Packs/day: 1.00     Years: 30.00     Types: Cigarettes    Smokeless tobacco: Never Used    Alcohol use No    Drug use: No    Sexual activity: Yes     Partners: Male     Other Topics Concern    Not on file     Social History Narrative    No narrative on file     Review of patient's allergies indicates:  No Known Allergies    Current Outpatient Prescriptions:     butalbital-aspirin-caffeine -40 mg (FIORINAL) -40 mg Cap, Take 1 capsule by mouth every 6 (six) hours as needed., Disp: 12 capsule, Rfl: 2    cetirizine (ZYRTEC) 10 MG tablet, Take 10 mg by mouth once daily., Disp: , Rfl:     ibuprofen (ADVIL,MOTRIN) 200 MG tablet, Take 600 mg by mouth 2 (two) times daily as needed for Pain., Disp: , Rfl:     lamotrigine (LAMICTAL) 25 MG tablet, Take 1 tablet daily for 1 week, then take 1 po BID for 1 week, then take 2 tablets BID for initial goal of 50 mg BID, Disp: 120 tablet, Rfl: 11    sumatriptan (IMITREX) 50 MG tablet, Take 1 at onset of headache, may repeat in 2 hours to a max of 3 per day, 2 days per week, Disp: 9 tablet, Rfl: 3      Objective:      Vitals:    17 0947   BP: 119/81   Pulse: 77   Resp: 17     Body mass index is 33.53 kg/m².    Physical Exam    Constitutional:   She appears  well-developed and well-nourished. She is well groomed    HENT:    Head: Atraumatic, oral and nasal mucosa intact  Eyes: Conjunctivae and EOM are normal. Pupils are equal, round, and reactive to light OU  Neck: Neck supple. No thyromegaly present  Cardiovascular: Normal rate and normal heart sounds  No murmur heard  Pulmonary/Chest: Effort normal and breath sounds normal  Musculoskeletal: Normal range of motion. No joint stiffness. No vertebral point tenderness  Skin: Skin is warm and dry  Psychiatric: Normal mood and affect     Neuro exam:    Mental status:  Awake, attentive, Alert, oriented to self, place, year and month  Language function is intact    Cranial Nerves:  Smell was not formally evaluated  Cranial Nerves II - XII: intact  Pursuits were smooth, normal saccades, no nystagmus OU  Funduscopic exam - disc were flat and pink, no exudates or hemorrhages OU  Motor - facial movement was symmetrical and normal     Palate moved well and was symmetrical with normal palatal and oral sensation  Tongue movements were full    Coordination:     Rapid alternating movements and rapid finger tapping - normal bilaterally  Finger to nose - normal and symmetric bilaterally   Heel to shin test - normal and symmetric bilaterally   Arm roll - smooth and symmetric   No intentional or positional tremor.     Motor:  Normal muscle bulk and symmetry. No fasciculations were noted    No pronator drift  Strength 5/5 bilaterally     Reflexes:  Tendon reflexes were 2 + at biceps, triceps, brachioradialis, patellar, and Achilles bilaterally  No clonus was noted     Sensory: Intact to light touch, pin prick in all extremities.     Gait: Romberg absent. Normal gait. Normal arm swing and turns. Good tandem    Review of Data:  Lab Results   Component Value Date     07/13/2017    K 4.2 07/13/2017     07/13/2017    CO2 26 07/13/2017    BUN 11 07/13/2017    CREATININE 0.7 07/13/2017    GLU 94 07/13/2017    AST 14 07/04/2017    ALT  14 07/04/2017    ALBUMIN 3.9 07/04/2017    PROT 6.9 07/04/2017    BILITOT 0.5 07/04/2017    CHOL 192 05/10/2017    HDL 36 (L) 05/10/2017    LDLCALC 138.2 05/10/2017    TRIG 89 05/10/2017       Lab Results   Component Value Date    WBC 10.69 07/13/2017    HGB 15.0 07/13/2017    HCT 43.3 07/13/2017    MCV 90 07/13/2017     (H) 07/13/2017       Lab Results   Component Value Date    TSH 0.826 05/10/2017     Results for orders placed or performed during the hospital encounter of 05/12/17   MRA Brain    Narrative    Technique:Noncontrast 3-D time-of-flight MRA head with review of axial source images and maximum intensity projection reconstructions      Comparison:None available at this institution     Findings:The quality of the study is good. The visualized cervical, petrous, cavernous and supraclinoid internal carotid arteries are unremarkable. The bilateral anterior cerebral and anterior communicating arteries are unremarkable. The bilateral middle cerebral arteries are patent and symmetric without evidence for stenosis.    The vertebral arteries are codominant. No vertebral artery stenosis or dissection is seen. The visualized inferior and superior cerebellar arteries are unremarkable. The basilar artery is unremarkable. The bilateral posterior cerebral arteries are unremarkable. There is a small, patent right posterior communicating artery. The left posterior communicating artery appears to be small or absent.    No intracranial aneurysm is identified.    Impression    1. Normal MRA of the brain. Specifically, no identifiable intracranial aneurysm is seen. There is reportedly a previous diagnosis of a 1 mm aneurysm which would be difficult to reliably diagnose by MRA.    Epic notification system activated.      Electronically signed by: Elizabeth Thayer MD  Date:     05/12/17  Time:    16:58    Results for orders placed or performed during the hospital encounter of 05/12/17   MRI Brain W WO Contrast    Narrative     Comparison:None available at this institution    Technique: Sagittal T1, axial T1, T2, flair, T2 gradient echo and diffusion and 3 plane postcontrast T1-weighted sequences of the brain. 9 ml Gadavist was administered intravenously for the contrast sequences.    Findings:No reduced diffusion is identified to suggest acute ischemia. The pituitary gland and corpus callosum are unremarkable. No focal abnormal magnetic susceptibility is identified to suggest abnormal parenchymal blood products. No abnormal T1 shortening is identified. No extra-axial fluid collection, mass effect or midline shift is seen. Normal ventricular size is noted. No acute posterior fossa abnormality is seen. No IAC abnormality is seen. Orbital soft tissues are unremarkable. The paranasal sinuses and mastoid air cells appear clear. Slight leftward nasal septal deviation noted. There are a few, small, foci of round T2 and flair hyperintensity in the subcortical white matter of the bilateral frontal and parietal lobes, to a degree which is common in this age group. No focal abnormal contrast enhancement is identified. T2 arterial and dural venous sinus flow voids appear maintained which will be better evaluated on dedicated MRA from the same day. No calvarial or clival abnormality is seen.    Impression    1. No acute abnormality is seen.  2. Mild, nonspecific bilateral subcortical chronic white matter disease to a degree which is common in this age group. Findings commonly reflects small vessel disease such as arteriolosclerosis.      Electronically signed by: Elizabeth Thayer MD  Date:     05/12/17  Time:    17:00                Assessment and Plan   Migraine with aura manifesting with scintillating scotoma. Due to the high frequency of attacks, 3 out of 5 migraines, Will obtain a 2D ECHO with bubble contrast looking for a PFO or ASD which are commonly found in association with migraine with aura. For prevention, will start Lamictal with an  initial goal of 50 mg BID. For the acute attack, trial of sumatriptan as first line and limited Fiorinal for rescue.  Migraine without aura. Same plan as above  Thyroid dysfunction, scheduled to see Endocrinology  Seasonal allergies  ALMA  I have discussed the side effects of the medications prescribed and the patient acknowledges understanding  RTC in 3 months with headache diary  Florentin Fischer M.D  Medical Director, Headache and Facial Pain  [facility]

## 2017-08-29 NOTE — PROGRESS NOTES
Headache questionnaire    1. When did your Headaches start?    1999 with the birth of last child      2. Where are your headaches located?   Both sides around the back of neck       3. Your headache's characteristics:   Excruciating, Pressure, Throbbing, Pounding, Sharp      4. How long does the headache last?   days      5. How often does the headache occur?   0 varies       6. Are your headaches preceded or accompanied by other symptoms? yes   If yes, please describe.  Blurry vision kaleidoscope affect , peripheral vision disappears       7. Does the headache awaken you at night? yes   If so, how often? Once or twice per month         8. Please bev the word that best describes your headache's intensity:    severe      9. Using a scale of 1 through 10, with 0 = no pain and 10 = the worst pain:   What score is your headache now? 2   What score is your headache at its worst? 10   What score is your headache at its best? 2        10. Possible associated headache symptoms:  [x]  Sensitivity to light  [] Dizziness  [] Nasal or sinus pressure/ pain   [x] Sensitivity to noise  [] Vertigo  [x] Problems with concentration  [] Sensitivity to smells  [] Ringing in ears  [] Problems with memory    [x] Blurred vision  [x] Irritability  [x] Problems with task completion   [] Double vision  [] Anger  [x]  Problems with relaxation  [] Loss of appetite  [] Anxiety  [x] Neck tightness, Neck pain  [x] Nausea   [] Nasal congestion  [] Vomiting         11. Headache improving factors:  [] Sleep  [x] Heat  [x] Darkness  [x] Ice  [] Local pressure [] Menses (period)  [x] Massage   [x] Medications:  advil and tylenol      12. Headache worsening factors:   [] Fatigue [] Sneezing  [x] Changes in Weather  [x] Light [x] Bending Over [x] Stress  [] Noise [] Ovulation  [] Multiple Sclerosis Flare-Up  [] Smells  [] Menses  [] Food   [x] Coughing [] Alcohol      13. Number of caffeinated drinks per day: 2      14. Number of diet drinks per day:   0      15. Have you seen any other [facility]ists within the last 3 years?  no

## 2017-08-29 NOTE — LETTER
August 29, 2017      Lovely Garza MD  [address] [facility] [address]           [facility]  [address] [facility] [address] [place] [address]  Phone: [phone]  Fax: [phone]          Patient: Marsha Khalil   MR Number: [id number]   YOB: 1965   Date of Visit: 8/29/2017       Dear Dr. Lovely Garza:    Thank you for referring Marsha Khalil to me for evaluation. Attached you will find relevant portions of my assessment and plan of care.    If you have questions, please do not hesitate to call me. I look forward to following Marsha Khalil along with you.    Sincerely,    Rosa Fischer MD    Enclosure  CC:  No Recipients    If you would like to receive this communication electronically, please contact [email or web address] or [phone] to request more information on [employer] Link access.    For providers and/or their staff who would like to refer a patient to [facility], please contact us through our one-stop-shop provider referral line, [facility], at [phone].    If you feel you have received this communication in error or would no longer like to receive these types of communications, please e-mail [email or web address]

## 2017-08-29 NOTE — PROGRESS NOTES
Please Check any Medications Tried for Headache    AED Neuromodulators  MAOIs  Ergot Alkaloids    Acetazolamide (Diamox) [] Phenelzine (Nardil) [] Dihydroergotamine (Migranal) []   Carbamazepine (Tegretol) [] Tranylcypromine (Parnate) [] Ergotamine (Ergomar) []   Gabapentin (Neurontin) [x] Antihistamine/Serotonergic  Triptans    Lacosamide (Vimpat) [] Cyproheptadine (Periactin) [] Almotriptan (Axert) []   Lamotrigine (Lamictal) [] Antihypertensives  Eletriptan (Relpax) []   Levatiracetam (Keppra) [] Atenolol (Tenormin) [] Frovatriptan (Frova) []   Oxcarbazepine (Trileptal) [] Bisoprostol (Zebeta) [] Naratriptan (Amerge) []   Phenobarbital [] Candesartan (Atacand) [] Rizatriptan (Maxalt) []   Phenytoin (Dilantin) [] Diltiazem (Cardizem) [] Sumatriptan (Imitrex) []   Pregabalin (Lyrica) [] Lisinopril (Prinivil, Zestril) [] Zolmitriptan (Zomig) []   Primidone (Mysoline) [] Metoprolol (Toprol) [] Combo Abortives    Tiagabine (Gabatril) [] Nadolol (Corgard) [] Butalbital and Acetaminophen (Bupap) []   Topiramate (Topamax)  (Trokendi) [x] Nicardipine (Cardene) [x]     Vigabatrin (Sabril) [] Nimodipine (Nimotop) [] Butalbital, Acetaminophen, and caffeine (Fioricet) []   Valproic Acid (Depakote) (Divalproex Sodium) [] Propranolol (Inderal) []     Zonisamide (Zonegran) [] Telmisartan (Micardis) [] Butalbital, Aspirin, and caffeine (Fiorinal) []   Benzodiazepines  Timolol (Blocadren) []     Alprazolam (Xanax) [] Verapamil (Calan, Verelan) [] Butalbital, Caffeine, Acetaminophen, and Codeine (Fioricet with Codeine) []   Diazepam (Valium) [] NSAIDs      Lorazepam (Ativan) [] Acetaminophen (Tylenol) [x]     Clonazepam (Klonopin) [] Acetylsalicylic Acid (Aspirin) [] Butalbital, Caffeine, Aspirin, and Codeine  (Fiorinal with Codeine) []   Antidepressants  Diclofenac (Cambia) []     Amitriptyline (Elavil) [] Ibuprofen (Motrin) [x]     Desipramine (Norpramin) [] Indomethacin (Indocin) [] Aspirin, Caffeine, and Acetaminophen  (Excedrin) (Goodys) []   Doxepin (Sinequan) [] Ketoprofen (Orudis) []     Fluoxetine (Prozac) [] Ketorolac (Toradol) [] Acetaminophen, Dichloralphenazone, and Isometheptene (Midrin) []   Imipramine (Tofranil) [] Naproxen (Anaprox) (Aleve) [x]     Nortriptyline (Pamelor) [] Meclofenamic Acid (Meclomen) []     Venlafaxine (Effexor) [] Meloxicam (Mobic) [x] Aspirin, Salicylamide, and Caffeine (BC Powder) []

## 2017-09-15 ENCOUNTER — CLINICAL SUPPORT (OUTPATIENT)
Dept: CARDIOLOGY | Facility: CLINIC | Age: 52
End: 2017-09-15
Payer: COMMERCIAL

## 2017-09-15 DIAGNOSIS — G43.119 INTRACTABLE MIGRAINE WITH AURA WITHOUT STATUS MIGRAINOSUS: ICD-10-CM

## 2017-09-15 PROCEDURE — 93306 TTE W/DOPPLER COMPLETE: CPT | Mod: S$GLB,,, | Performed by: INTERNAL MEDICINE

## 2017-09-18 ENCOUNTER — HOSPITAL ENCOUNTER (OUTPATIENT)
Dept: RADIOLOGY | Facility: CLINIC | Age: 52
Discharge: HOME | End: 2017-09-18
Attending: PODIATRIST
Payer: COMMERCIAL

## 2017-09-18 ENCOUNTER — OFFICE VISIT (OUTPATIENT)
Dept: PODIATRY | Facility: CLINIC | Age: 52
End: 2017-09-18
Payer: COMMERCIAL

## 2017-09-18 VITALS — HEIGHT: 65 IN | WEIGHT: 205 LBS | BODY MASS INDEX: 34.16 KG/M2

## 2017-09-18 DIAGNOSIS — M79.672 FOOT PAIN, LEFT: ICD-10-CM

## 2017-09-18 DIAGNOSIS — M24.573 EQUINUS CONTRACTURE OF ANKLE: ICD-10-CM

## 2017-09-18 DIAGNOSIS — R22.42 MASS OF FOOT, LEFT: ICD-10-CM

## 2017-09-18 DIAGNOSIS — R22.42 MASS OF FOOT, LEFT: Primary | ICD-10-CM

## 2017-09-18 PROCEDURE — 73630 X-RAY EXAM OF FOOT: CPT | Mod: TC,PO,LT

## 2017-09-18 PROCEDURE — 99999 PR PBB SHADOW E&M-EST. PATIENT-LVL III: CPT | Mod: PBBFAC,,, | Performed by: PODIATRIST

## 2017-09-18 PROCEDURE — 3008F BODY MASS INDEX DOCD: CPT | Mod: S$GLB,,, | Performed by: PODIATRIST

## 2017-09-18 PROCEDURE — 73630 X-RAY EXAM OF FOOT: CPT | Mod: 26,LT,S$GLB, | Performed by: RADIOLOGY

## 2017-09-18 PROCEDURE — 99203 OFFICE O/P NEW LOW 30 MIN: CPT | Mod: 25,S$GLB,, | Performed by: PODIATRIST

## 2017-09-18 RX ORDER — LIDOCAINE HYDROCHLORIDE 20 MG/ML
JELLY TOPICAL
Qty: 30 ML | Refills: 2 | Status: SHIPPED | OUTPATIENT
Start: 2017-09-18 | End: 2018-01-22

## 2017-09-18 NOTE — LETTER
September 18, 2017      Butch Man MD  [address] [place] [address]           [facility]  [address] [place] [address]  Phone: [phone]          Patient: Marsha Khalil   MR Number: [id number]   YOB: 1965   Date of Visit: 9/18/2017       Dear Dr. Butch Man:    Thank you for referring Marsha Khalil to me for evaluation. Attached you will find relevant portions of my assessment and plan of care.    If you have questions, please do not hesitate to call me. I look forward to following Marsha Khalil along with you.    Sincerely,    William Menendez, APRIL    Enclosure  CC:  No Recipients    If you would like to receive this communication electronically, please contact [email or web address] or [phone] to request more information on [employer] Link access.    For providers and/or their staff who would like to refer a patient to [facility], please contact us through our one-stop-shop provider referral line, [facility] , at [phone].    If you feel you have received this communication in error or would no longer like to receive these types of communications, please e-mail [email or web address]

## 2017-09-18 NOTE — PROGRESS NOTES
Subjective:      Patient ID: Marsha Khalil is a 51 y.o. female.    Chief Complaint: Foot Pain (Left) and Heel Pain (Left)    Painful firm mass left heel.  Gradual onset - always been there to her recollection, worsening over past several months, aggravated by increased weight bearing, shoe gear, pressure.  No previous medical treatment.  OTC pain med not helping.  Denies trauma, signs infection, surgery left heel.      Review of Systems   Constitution: Negative for chills, diaphoresis, fever, malaise/fatigue and night sweats.   Cardiovascular: Negative for claudication, cyanosis, leg swelling and syncope.   Skin: Positive for suspicious lesions. Negative for color change, dry skin, nail changes, rash and unusual hair distribution.   Musculoskeletal: Negative for falls, joint pain, joint swelling, muscle cramps, muscle weakness and stiffness.   Gastrointestinal: Negative for constipation, diarrhea, nausea and vomiting.   Neurological: Negative for brief paralysis, disturbances in coordination, focal weakness, numbness, paresthesias, sensory change and tremors.           Objective:      Physical Exam   Constitutional: She appears well-developed and well-nourished. She is cooperative. No distress.   Cardiovascular:   Pulses:       Popliteal pulses are 2+ on the right side, and 2+ on the left side.        Dorsalis pedis pulses are 2+ on the right side, and 2+ on the left side.        Posterior tibial pulses are 2+ on the right side, and 2+ on the left side.   Capillary refill 3 seconds all toes/distal feet, all toes/both feet warm to touch.      Negative lymphadenopathy bilateral popliteal fossa and tarsal tunnel.      Negavie lower extremity edema bilateral.     Musculoskeletal:        Right ankle: Normal. She exhibits normal range of motion, no swelling, no ecchymosis, no deformity, no laceration and normal pulse. Achilles tendon normal. Achilles tendon exhibits no pain, no defect and normal Washburn's test  results.   Painful non mobile mass plantar medial left heel ovoid in shape, diascopy negative, and  without ulceration, drainage, pus, tracking, fluctuance, malodor, or cardinal signs infection.     Relates similar lesion left axilla.    Otherwise, Normal angle, base, station of gait. All ten toes without clubbing, cyanosis, or signs of ischemia.  No pain to palpation bilateral lower extremities.  Range of motion, stability, muscle strength, and muscle tone normal bilateral feet and legs.     Lymphadenopathy:   Negative lymphadenopathy bilateral popliteal fossa and tarsal tunnel.   Neurological: She is alert. She has normal strength. She displays no atrophy and no tremor. No sensory deficit. She exhibits normal muscle tone. She displays no seizure activity. Gait normal.   Reflex Scores:       Patellar reflexes are 2+ on the right side and 2+ on the left side.       Achilles reflexes are 2+ on the right side and 2+ on the left side.  Negative tinel sign to percussion sural, superficial peroneal, deep peroneal, saphenous, and posterior tibial nerves right and left ankles and feet.     Skin: Skin is warm, dry and intact. No abrasion, no bruising, no burn, no ecchymosis, no laceration, no lesion and no rash noted. She is not diaphoretic. No cyanosis or erythema. No pallor. Nails show no clubbing.   Mild hyperkeratosis overlying mass left heel.    Otherwise,  Skin is normal age and health appropriate color, turgor, texture, and temperature bilateral lower extremities without ulceration, hyperpigmentation, discoloration, masses nodules or cords palpated.  No ecchymosis, erythema, edema, or cardinal signs of infection bilateral lower extremities.               Assessment:       Encounter Diagnoses   Name Primary?    Mass of foot, left Yes    Foot pain, left          Plan:       Marsha was seen today for foot pain and heel pain.    Diagnoses and all orders for this visit:    Mass of foot, left  -     X-Ray Foot Complete  Left; Future  -     MRI Lower Extremity W WO Contrast Left; Future    Foot pain, left  -     X-Ray Foot Complete Left; Future  -     MRI Lower Extremity W WO Contrast Left; Future      I counseled the patient on her conditions, their implications and medical management.        Discussed very low chance of cancer with any mass in body only disprovable by biopsy and pathology.  Patient verbalizes understanding and declines biopsy/immaging today.     Discussed conservative treatment with shoes of adequate dimensions, material, and style to alleviate symptoms and delay or prevent surgical intervention.    Rx lidocaine, xrays, MRI    I applied a plantar rest strapping to the patient's left foot to offload symptomatic area, support the arch, and relieve pain.              Return in about 2 weeks (around 10/2/2017).

## 2017-09-19 LAB
DIASTOLIC DYSFUNCTION: NO
ESTIMATED PA SYSTOLIC PRESSURE: 31.3
RETIRED EF AND QEF - SEE NOTES: 60 (ref 55–65)

## 2017-09-20 ENCOUNTER — OFFICE VISIT (OUTPATIENT)
Dept: FAMILY MEDICINE | Facility: CLINIC | Age: 52
End: 2017-09-20
Payer: COMMERCIAL

## 2017-09-20 ENCOUNTER — DOCUMENTATION ONLY (OUTPATIENT)
Dept: FAMILY MEDICINE | Facility: CLINIC | Age: 52
End: 2017-09-20

## 2017-09-20 ENCOUNTER — OFFICE VISIT (OUTPATIENT)
Dept: SURGERY | Facility: CLINIC | Age: 52
End: 2017-09-20
Payer: COMMERCIAL

## 2017-09-20 VITALS
HEART RATE: 86 BPM | HEIGHT: 65 IN | DIASTOLIC BLOOD PRESSURE: 79 MMHG | SYSTOLIC BLOOD PRESSURE: 135 MMHG | TEMPERATURE: 99 F | BODY MASS INDEX: 33.5 KG/M2 | WEIGHT: 201.06 LBS

## 2017-09-20 VITALS — BODY MASS INDEX: 33.5 KG/M2 | HEIGHT: 65 IN | WEIGHT: 201.06 LBS | TEMPERATURE: 99 F

## 2017-09-20 DIAGNOSIS — L02.412 ABSCESS OF AXILLA, LEFT: Primary | ICD-10-CM

## 2017-09-20 DIAGNOSIS — R22.32 AXILLARY MASS, LEFT: Primary | ICD-10-CM

## 2017-09-20 PROCEDURE — 99999 PR PBB SHADOW E&M-EST. PATIENT-LVL III: CPT | Mod: PBBFAC,,, | Performed by: SURGERY

## 2017-09-20 PROCEDURE — 10060 I&D ABSCESS SIMPLE/SINGLE: CPT | Mod: S$GLB,,, | Performed by: SURGERY

## 2017-09-20 PROCEDURE — 99243 OFF/OP CNSLTJ NEW/EST LOW 30: CPT | Mod: 25,S$GLB,, | Performed by: SURGERY

## 2017-09-20 PROCEDURE — 99214 OFFICE O/P EST MOD 30 MIN: CPT | Mod: S$GLB,,, | Performed by: FAMILY MEDICINE

## 2017-09-20 PROCEDURE — 3008F BODY MASS INDEX DOCD: CPT | Mod: S$GLB,,, | Performed by: FAMILY MEDICINE

## 2017-09-20 PROCEDURE — 99999 PR PBB SHADOW E&M-EST. PATIENT-LVL III: CPT | Mod: PBBFAC,,, | Performed by: FAMILY MEDICINE

## 2017-09-20 PROCEDURE — 87070 CULTURE OTHR SPECIMN AEROBIC: CPT

## 2017-09-20 RX ORDER — SULFAMETHOXAZOLE AND TRIMETHOPRIM 800; 160 MG/1; MG/1
1 TABLET ORAL EVERY 12 HOURS
Qty: 20 TABLET | Refills: 0 | Status: SHIPPED | OUTPATIENT
Start: 2017-09-20 | End: 2017-09-30

## 2017-09-20 NOTE — PROGRESS NOTES
Subjective:       Patient ID: Marsha Khalil is a 51 y.o. female.    Chief Complaint: Consult (lump under left arm for about 1 month, has gotten larger)    Referred by Dr. Garza with lump in left axilla.    Had for about a month, recently growing and becoming painful.    Started on Bactrim this morning.      Review of Systems   Constitutional: Negative for appetite change, chills, diaphoresis, fatigue, fever and unexpected weight change.   HENT: Negative for hearing loss, sore throat, trouble swallowing and voice change.    Eyes: Negative for visual disturbance.   Respiratory: Negative for cough, shortness of breath and wheezing.    Cardiovascular: Negative for chest pain, palpitations and leg swelling.   Gastrointestinal: Negative for abdominal distention, abdominal pain, anal bleeding, blood in stool, constipation, diarrhea, nausea, rectal pain and vomiting.   Genitourinary: Negative for difficulty urinating, dysuria, flank pain, frequency, hematuria, menstrual problem and urgency.   Musculoskeletal: Negative for arthralgias, back pain, joint swelling, myalgias and neck pain.   Skin: Negative for pallor and rash.   Neurological: Negative for dizziness, syncope, weakness and headaches.   Hematological: Negative for adenopathy. Does not bruise/bleed easily.   Psychiatric/Behavioral: Negative for suicidal ideas. The patient is not nervous/anxious.        Objective:      Physical Exam   Constitutional: She is oriented to person, place, and time. She appears well-developed and well-nourished. No distress.   HENT:   Head: Normocephalic and atraumatic.   Right Ear: External ear normal.   Left Ear: External ear normal.   Eyes: Conjunctivae are normal. Pupils are equal, round, and reactive to light. Right eye exhibits no discharge. Left eye exhibits no discharge.   Neck: No tracheal deviation present. No thyromegaly present.   Cardiovascular: Normal rate and regular rhythm.    Pulmonary/Chest: Effort normal. No  respiratory distress.   Abdominal: Soft. She exhibits no distension. There is no guarding.   Musculoskeletal: She exhibits no edema or tenderness.   Lymphadenopathy:     She has no cervical adenopathy.   Neurological: She is alert and oriented to person, place, and time. No cranial nerve deficit.   Skin: Skin is warm and dry. No rash noted. She is not diaphoretic. No pallor.        Psychiatric: She has a normal mood and affect. Her behavior is normal. Judgment and thought content normal.   Nursing note and vitals reviewed.      Assessment:       1. Abscess of axilla, left        Plan:       See procedure note.

## 2017-09-20 NOTE — PROGRESS NOTES
Pre-Visit Chart Review  For Appointment Scheduled on 9/20/17.    Health Maintenance Due   Topic Date Due    Hepatitis C Screening  1965    TETANUS VACCINE  10/22/1983    Pneumococcal PPSV23 (Medium Risk) (1) 10/22/1983    Pap Smear with HPV Cotest  10/22/1986    Mammogram  10/22/2005    Colonoscopy  10/22/2015    Influenza Vaccine  08/01/2017

## 2017-09-20 NOTE — LETTER
September 20, 2017      Lovely Garza MD  [address]  [facility] [address]           [facility] General [facility]  [address]  [facility] [address]  Phone: [phone]          Patient: Marsha Khalil   MR Number: [id number]   YOB: 1965   Date of Visit: 9/20/2017       Dear Dr. Lovely Garza:    Thank you for referring Marsha Khalil to me for evaluation. Attached you will find relevant portions of my assessment and plan of care.    If you have questions, please do not hesitate to call me. I look forward to following Marsha Khalil along with you.    Sincerely,    Tomás Terry MD    Enclosure  CC:  No Recipients    If you would like to receive this communication electronically, please contact [email or web address] or [phone] to request more information on [employer] Link access.    For providers and/or their staff who would like to refer a patient to [facility], please contact us through our one-stop-shop provider referral line, [facility], at [phone].    If you feel you have received this communication in error or would no longer like to receive these types of communications, please e-mail [email or web address]

## 2017-09-20 NOTE — PROGRESS NOTES
CHIEF COMPLAINT:  Left axillary mass      HISTORY OF PRESENT ILLNESS:  Marsha Khalil is a 51 y.o. female who presents to clinic for an UC visit for evaluation of a left axillary mass. She states that one month ago she developed swelling in her left axillary area which has increased in size. It is tender. She denies any erythema or drainage. She states that she has had abscesses in her right axilla which spontaneously drained.  She does shave her axillae. She denies any fever, chills, LAD.        REVIEW OF SYSTEMS:  The patient denies any fever, chills, night sweats, headaches, vision changes, difficulty speaking or swallowing, decreased hearing, weight loss, weight gain, chest pain, palpitations, shortness of breath, cough, nausea, vomiting, abdominal pain, dysuria, diarrhea, constipation, hematuria, hematochezia, melena, changes in her hair,  nails, numbness or weakness in her extremities, erythema, pain or swelling over any of her joints, myalgia, swollen glands, easy bruising, fatigue, edema, symptoms of anxiety or depression.       MEDICATIONS:   Reviewed and/or reconciled in EPIC    ALLERGIES:  Reviewed and/or reconciled in [facility]    PAST MEDICAL/SURGICAL HISTORY:   Past Medical History:   Diagnosis Date    Allergy     Chest pain     negative stress test and heart cath    Headache     ALMA (obstructive sleep apnea)     Seasonal allergies       Past Surgical History:   Procedure Laterality Date    abscess removal from right side of neck      APPENDECTOMY      BREAST SURGERY Left     lumpectomy, benign    CARDIAC CATHETERIZATION      part of evaluation for chest pain, negative     SECTION      x1    ENDOMETRIAL ABLATION      TUBAL LIGATION         FAMILY HISTORY:    Family History   Problem Relation Age of Onset    Multiple sclerosis Father     Diabetes Father     Heart disease Father 50     CAD    Diabetes Mother     Cancer Mother      breast    Heart disease Mother 50     CAD     "Thyroid disease Daughter      hypothyroidism    Transient ischemic attack Sister        SOCIAL HISTORY:    Social History     Social History    Marital status:      Spouse name: N/A    Number of children: N/A    Years of education: N/A     Occupational History    Not on file.     Social History Main Topics    Smoking status: Current Every Day Smoker     Packs/day: 1.00     Years: 30.00     Types: Cigarettes    Smokeless tobacco: Never Used    Alcohol use No    Drug use: No    Sexual activity: Yes     Partners: Male     Other Topics Concern    Not on file     Social History Narrative    No narrative on file       PHYSICAL EXAM:  VITAL SIGNS:   Vitals:    09/20/17 0920   BP: 135/79   BP Location: Left arm   Patient Position: Sitting   BP Method: Small (Automatic)   Pulse: 86   Temp: 99.1 °F (37.3 °C)   TempSrc: Oral   Weight: 91.2 kg (201 lb 1 oz)   Height: 5' 5" (1.651 m)     GENERAL:  Patient appears well nourished, sitting on exam table, in no acute distress.  HEENT:  Atraumatic, normocephalic, PERRLA, EOMI, no conjunctival injection, sclerae are anicteric, normal external auditory canals,TMs clear b/l, gross hearing intact to whisper, MMM, no oropharygneal erythema or exudate.  NECK:  Supple, normal ROM, trachea is midline , no supraclavicular or cervical LAD or masses palpated.  Thyroid gland not palpable.  CARDIOVASCULAR:  RRR, normal S1 and S2, no m/r/g.  RESPIRATORY:  CTA b/l, no wheezes, rhonchi, rales.  No increased work of breathing, no  use of accessory muscles.  ABDOMEN:  Soft, nontender, nondistended, normoactive bowel sounds in all four quadrants, no rebound or guarding, no HSM or masses palpated.  Normal percussion.  EXTREMITIES:  2+ DP pulses b/l, no edema.  SKIN:  Warm, 3x3 cm tender fluctuant mass in left axilla  NEUROMUSCULAR:  Cranial nerves II-XII grossly intact.   No clubbing or cyanosis of digits/nails.  Steady gait.  PSYCH:  Patient is alert and oriented to person, time, " place. They are appropriately dressed and groomed. There is normal eye contact. Rate and tone of speech is normal. Normal insight, judgement. Normal thought content and process.         ASSESSMENT/PLAN: This is a 51 y.o. female who presents to clinic for evaluation of left axillary mass  1. Left axillary mass: Cyst vs. abscess. Will start on bactrim DS and refer to general surgery for I&D or excision          Lovely Garza MD

## 2017-09-20 NOTE — PROCEDURES
"Incision & Drainage  Date/Time: 9/20/2017 4:01 PM  Performed by: TALYA HARMAN.  Authorized by: TALYA HARMAN     Time out: Immediately prior to procedure a "time out" was called to verify the correct patient, procedure, equipment, support staff and site/side marked as required.    Consent Done?:  Yes (Verbal)    Type:  Abscess  Body area:  Trunk  Anesthesia:  Local infiltration  Local anesthetic:  Xylocaine 1% with epinephrinelidocaine 1% with epinephrine  Anesthetic total (ml):  10  Scalpel size:  11  Incision type:  Single straight  Complexity:  Simple  Drainage:  Pus  Drainage amount:  Copious  Wound treatment:  Incision, wound left open and wound packed  Packing material:  1/4 in gauze  Patient tolerance:  Patient tolerated the procedure well with no immediate complications    Return 9/22 for removal of packing.      "

## 2017-09-21 ENCOUNTER — TELEPHONE (OUTPATIENT)
Dept: SURGERY | Facility: CLINIC | Age: 52
End: 2017-09-21

## 2017-09-21 NOTE — TELEPHONE ENCOUNTER
----- Message from Judi Biswas sent at 9/21/2017  8:27 AM CDT -----  Contact: billy   Incision and packing questions  Call back

## 2017-09-22 ENCOUNTER — HOSPITAL ENCOUNTER (OUTPATIENT)
Dept: RADIOLOGY | Facility: HOSPITAL | Age: 52
Discharge: HOME | End: 2017-09-22
Attending: PODIATRIST
Payer: COMMERCIAL

## 2017-09-22 DIAGNOSIS — M79.672 LEFT FOOT PAIN: ICD-10-CM

## 2017-09-22 DIAGNOSIS — M79.672 LEFT FOOT PAIN: Primary | ICD-10-CM

## 2017-09-22 PROCEDURE — 73720 MRI LWR EXTREMITY W/O&W/DYE: CPT | Mod: 26,LT,, | Performed by: RADIOLOGY

## 2017-09-22 PROCEDURE — 73720 MRI LWR EXTREMITY W/O&W/DYE: CPT | Mod: TC,LT

## 2017-09-22 PROCEDURE — A9585 GADOBUTROL INJECTION: HCPCS | Performed by: PODIATRIST

## 2017-09-22 PROCEDURE — 25500020 PHARM REV CODE 255: Performed by: PODIATRIST

## 2017-09-22 RX ORDER — GADOBUTROL 604.72 MG/ML
INJECTION INTRAVENOUS
Status: DISCONTINUED
Start: 2017-09-22 | End: 2017-09-23 | Stop reason: HOSPADM

## 2017-09-22 RX ORDER — GADOBUTROL 604.72 MG/ML
9 INJECTION INTRAVENOUS
Status: COMPLETED | OUTPATIENT
Start: 2017-09-22 | End: 2017-09-22

## 2017-09-22 RX ADMIN — GADOBUTROL 9 ML: 604.72 INJECTION INTRAVENOUS at 08:09

## 2017-09-25 LAB — BACTERIA SPEC AEROBE CULT: NO GROWTH

## 2017-10-02 ENCOUNTER — OFFICE VISIT (OUTPATIENT)
Dept: PODIATRY | Facility: CLINIC | Age: 52
End: 2017-10-02
Payer: COMMERCIAL

## 2017-10-02 ENCOUNTER — TELEPHONE (OUTPATIENT)
Dept: PODIATRY | Facility: CLINIC | Age: 52
End: 2017-10-02

## 2017-10-02 VITALS — BODY MASS INDEX: 33.79 KG/M2 | HEIGHT: 65 IN | WEIGHT: 202.81 LBS

## 2017-10-02 DIAGNOSIS — R22.42 MASS OF LEFT FOOT: Primary | ICD-10-CM

## 2017-10-02 PROCEDURE — 99212 OFFICE O/P EST SF 10 MIN: CPT | Mod: S$GLB,,, | Performed by: PODIATRIST

## 2017-10-02 PROCEDURE — 99999 PR PBB SHADOW E&M-EST. PATIENT-LVL III: CPT | Mod: PBBFAC,,, | Performed by: PODIATRIST

## 2017-10-02 PROCEDURE — 3008F BODY MASS INDEX DOCD: CPT | Mod: S$GLB,,, | Performed by: PODIATRIST

## 2017-10-02 RX ORDER — SULFAMETHOXAZOLE AND TRIMETHOPRIM 400; 80 MG/1; MG/1
1 TABLET ORAL
COMMUNITY
End: 2017-10-31

## 2017-10-02 NOTE — PROGRESS NOTES
Subjective:      Patient ID: Marsha Khalil is a 51 y.o. female.    Chief Complaint: Foot Pain (follow up )    Painful firm mass left heel.  Gradual onset - always been there to her recollection, worsening over past several months, aggravated by increased weight bearing, shoe gear, pressure.  No previous medical treatment.  OTC pain med not helping.  Denies trauma, signs infection, surgery left heel. MRI shows mass with central reactive area and surrounding larger more dense tissue.      Review of Systems   Constitution: Negative for chills, diaphoresis, fever, malaise/fatigue and night sweats.   Cardiovascular: Negative for claudication, cyanosis, leg swelling and syncope.   Skin: Positive for suspicious lesions. Negative for color change, dry skin, nail changes, rash and unusual hair distribution.   Musculoskeletal: Negative for falls, joint pain, joint swelling, muscle cramps, muscle weakness and stiffness.   Gastrointestinal: Negative for constipation, diarrhea, nausea and vomiting.   Neurological: Negative for brief paralysis, disturbances in coordination, focal weakness, numbness, paresthesias, sensory change and tremors.           Objective:      Physical Exam   Constitutional: She appears well-developed and well-nourished. She is cooperative. No distress.   Cardiovascular:   Pulses:       Popliteal pulses are 2+ on the right side, and 2+ on the left side.        Dorsalis pedis pulses are 2+ on the right side, and 2+ on the left side.        Posterior tibial pulses are 2+ on the right side, and 2+ on the left side.   Capillary refill 3 seconds all toes/distal feet, all toes/both feet warm to touch.      Negative lymphadenopathy bilateral popliteal fossa and tarsal tunnel.      Negavie lower extremity edema bilateral.     Musculoskeletal:        Right ankle: Normal. She exhibits normal range of motion, no swelling, no ecchymosis, no deformity, no laceration and normal pulse. Achilles tendon normal. Achilles  tendon exhibits no pain, no defect and normal Washburn's test results.   Painful non mobile mass plantar medial left heel ovoid in shape, diascopy negative, and  without ulceration, drainage, pus, tracking, fluctuance, malodor, or cardinal signs infection.     Relates similar lesion left axilla.    Otherwise, Normal angle, base, station of gait. All ten toes without clubbing, cyanosis, or signs of ischemia.  No pain to palpation bilateral lower extremities.  Range of motion, stability, muscle strength, and muscle tone normal bilateral feet and legs.     Lymphadenopathy:   Negative lymphadenopathy bilateral popliteal fossa and tarsal tunnel.   Neurological: She is alert. She has normal strength. She displays no atrophy and no tremor. No sensory deficit. She exhibits normal muscle tone. She displays no seizure activity. Gait normal.   Reflex Scores:       Patellar reflexes are 2+ on the right side and 2+ on the left side.       Achilles reflexes are 2+ on the right side and 2+ on the left side.  Negative tinel sign to percussion sural, superficial peroneal, deep peroneal, saphenous, and posterior tibial nerves right and left ankles and feet.     Skin: Skin is warm, dry and intact. No abrasion, no bruising, no burn, no ecchymosis, no laceration, no lesion and no rash noted. She is not diaphoretic. No cyanosis or erythema. No pallor. Nails show no clubbing.   Mild hyperkeratosis overlying mass left heel.    Otherwise,  Skin is normal age and health appropriate color, turgor, texture, and temperature bilateral lower extremities without ulceration, hyperpigmentation, discoloration, masses nodules or cords palpated.  No ecchymosis, erythema, edema, or cardinal signs of infection bilateral lower extremities.               Assessment:       Encounter Diagnosis   Name Primary?    Mass of left foot Yes         Plan:       Marsha was seen today for foot pain.    Diagnoses and all orders for this visit:    Mass of left foot  -      Ambulatory consult to Orthopedics      I counseled the patient on her conditions, their implications and medical management.    Discussed very low chance of cancer with any mass in body only disprovable by biopsy and pathology.  Patient verbalizes understanding and declines biopsy/immaging today.     Discussed conservative treatment with shoes of adequate dimensions, material, and style to alleviate symptoms and delay or prevent surgical intervention.    Recommend needle type biopsy to help identify mass prior to planning more definitive excision type management.    Asking Dr. Alfonso if he will help this patient.                Return in about 1 week (around 10/9/2017).

## 2017-10-02 NOTE — Clinical Note
Dr. Alfonso,  Would you be willing to see Mrs Khalil for needle biopsy of her lesion to assist in planning definitive management?  William

## 2017-10-02 NOTE — TELEPHONE ENCOUNTER
Dr Menendez would like Mrs Larson to be seen by Dr Zackery Alfonso for evaluation and treatment of mass of left foot.

## 2017-10-19 ENCOUNTER — TELEPHONE (OUTPATIENT)
Dept: PRIMARY CARE CLINIC | Facility: CLINIC | Age: 52
End: 2017-10-19

## 2017-10-19 NOTE — TELEPHONE ENCOUNTER
----- Message from Dada Jacobs sent at 10/19/2017  2:41 PM CDT -----  Contact: self   Placed call to pod, patient missed a call from your office. Please call back at [phone] ([phone])

## 2017-10-20 ENCOUNTER — LAB VISIT (OUTPATIENT)
Dept: LAB | Facility: HOSPITAL | Age: 52
End: 2017-10-20
Attending: INTERNAL MEDICINE
Payer: COMMERCIAL

## 2017-10-20 ENCOUNTER — OFFICE VISIT (OUTPATIENT)
Dept: ENDOCRINOLOGY | Facility: CLINIC | Age: 52
End: 2017-10-20
Payer: COMMERCIAL

## 2017-10-20 VITALS
WEIGHT: 205.69 LBS | BODY MASS INDEX: 34.27 KG/M2 | TEMPERATURE: 99 F | HEIGHT: 65 IN | HEART RATE: 74 BPM | RESPIRATION RATE: 20 BRPM | DIASTOLIC BLOOD PRESSURE: 80 MMHG | SYSTOLIC BLOOD PRESSURE: 144 MMHG

## 2017-10-20 DIAGNOSIS — Z78.0 POSTMENOPAUSAL: ICD-10-CM

## 2017-10-20 DIAGNOSIS — L73.2 HIDRADENITIS AXILLARIS: ICD-10-CM

## 2017-10-20 DIAGNOSIS — G47.33 OSA (OBSTRUCTIVE SLEEP APNEA): ICD-10-CM

## 2017-10-20 DIAGNOSIS — E04.2 MULTIPLE THYROID NODULES: Primary | ICD-10-CM

## 2017-10-20 DIAGNOSIS — R13.10 DYSPHAGIA, UNSPECIFIED TYPE: ICD-10-CM

## 2017-10-20 DIAGNOSIS — E04.2 MULTIPLE THYROID NODULES: ICD-10-CM

## 2017-10-20 LAB
FSH SERPL-ACNC: 28.2 MIU/ML
LH SERPL-ACNC: 10.5 MIU/ML
T3 SERPL-MCNC: 81 NG/DL
T4 FREE SERPL-MCNC: 1.02 NG/DL
TSH SERPL DL<=0.005 MIU/L-ACNC: 0.89 UIU/ML

## 2017-10-20 PROCEDURE — 36415 COLL VENOUS BLD VENIPUNCTURE: CPT | Mod: PO

## 2017-10-20 PROCEDURE — 83036 HEMOGLOBIN GLYCOSYLATED A1C: CPT

## 2017-10-20 PROCEDURE — 86800 THYROGLOBULIN ANTIBODY: CPT

## 2017-10-20 PROCEDURE — 83002 ASSAY OF GONADOTROPIN (LH): CPT

## 2017-10-20 PROCEDURE — 99999 PR PBB SHADOW E&M-EST. PATIENT-LVL III: CPT | Mod: PBBFAC,,, | Performed by: INTERNAL MEDICINE

## 2017-10-20 PROCEDURE — 83001 ASSAY OF GONADOTROPIN (FSH): CPT

## 2017-10-20 PROCEDURE — 84439 ASSAY OF FREE THYROXINE: CPT

## 2017-10-20 PROCEDURE — 99204 OFFICE O/P NEW MOD 45 MIN: CPT | Mod: S$GLB,,, | Performed by: INTERNAL MEDICINE

## 2017-10-20 PROCEDURE — 84480 ASSAY TRIIODOTHYRONINE (T3): CPT

## 2017-10-20 PROCEDURE — 84443 ASSAY THYROID STIM HORMONE: CPT

## 2017-10-20 NOTE — LETTER
October 20, 2017      Lovely Garza MD  [address] [address]           [facility]  [address] [address]  Phone: [phone]          Patient: Marsha Choi   MR Number: [id number]   YOB: 1965   Date of Visit: 10/20/2017       Dear Dr. Lovely Garza:    Thank you for referring Marsha Choi to me for evaluation. Attached you will find relevant portions of my assessment and plan of care.    If you have questions, please do not hesitate to call me. I look forward to following Marsha Choi along with you.    Sincerely,    Jeremiah Whipple PA-C    Enclosure  CC:  No Recipients    If you would like to receive this communication electronically, please contact [email or web address] or [phone] to request more information on [facility] Link access.    For providers and/or their staff who would like to refer a patient to [facility], please contact us through our one-stop-shop provider referral line, [facility] , at [phone].    If you feel you have received this communication in error or would no longer like to receive these types of communications, please e-mail [email or web address]

## 2017-10-20 NOTE — PROGRESS NOTES
CC:Thyroid nodule    HPI: Ms. Marsha Choi is a female here for multiple thyroid nodules. Physician wanted to do radio uptake and scan ~18 years ago because of prominent swelling. The scan showed one side of her thyroid had uptake. TFTs have been normal ever since then.     Just moved to [place] in January. 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    States choking when drinking cold liquids, drinks coffee often, no swelling (states she did have swelling in May), deeper and raspier voice. Never used lithium or amiodarone.  No mood changes or changes in heart rate.    Constipation, gained 20 lbs this year     [place] baseline score of 13.  She does not have trouble sleeping. She attributes her daytime sleepiness to having long days. Reports snoring, previous polysomnography showed severe sleep apnea but she says this ways from her allergies. States her  says she snores.    PMhx, SxHx: reviewed in Epic with patient.  Social: does not drink, current smoker 35 pack years    Last eye exam: will go in Jan    She had uterine ablation several years ago.    Review of Systems   Constitutional: Negative for chills and fever.   HENT: Positive for hearing loss. Negative for sore throat.         No facial puffyness   Eyes: Negative for blurred vision and double vision.        Vision is getting worse   Respiratory: Negative for cough and shortness of breath (dyspnea).    Cardiovascular: Negative for chest pain, palpitations and leg swelling.   Gastrointestinal: Positive for constipation. Negative for diarrhea, nausea and vomiting.   Genitourinary: Negative for dysuria, frequency and urgency.   Musculoskeletal: Positive for joint pain. Negative for myalgias.   Skin: Negative for itching and rash.        Abscesses under arms  Mass on foot (being seen by Podiatry)   Neurological: Positive for headaches. Negative for weakness.   Endo/Heme/Allergies: Negative for polydipsia.        Cold intolerance  "  Psychiatric/Behavioral: The patient is not nervous/anxious and does not have insomnia.      Objective:  BP (!) 144/80 (BP Location: Right arm, Patient Position: Sitting, BP Method: Large (Automatic))   Pulse 74   Temp 98.5 °F (36.9 °C) (Oral)   Resp 20   Ht 5' 5" (1.651 m)   Wt 93.3 kg (205 lb 11 oz)   BMI 34.23 kg/m²     Physical Exam   Constitutional: She is oriented to person, place, and time and well-developed, well-nourished, and in no distress. No distress.   HENT:   Head: Normocephalic and atraumatic.   Eyes: EOM are normal. Pupils are equal, round, and reactive to light. No scleral icterus.   Neck: Normal range of motion. Neck supple. No tracheal deviation present. No thyromegaly present.   Cardiovascular: Normal rate, regular rhythm and normal heart sounds.  Exam reveals no gallop and no friction rub.    No murmur heard.  Pulmonary/Chest: Effort normal and breath sounds normal. No respiratory distress. She has no wheezes. She has no rales.   Abdominal: Soft. She exhibits no distension. There is no tenderness. There is no rebound.   Musculoskeletal: Normal range of motion. She exhibits no edema or tenderness.   Neurological: She is alert and oriented to person, place, and time. Gait normal.   Skin: Skin is warm and dry. No rash noted. She is not diaphoretic. No erythema.   Firm mass on left foot   Psychiatric: Mood, memory, affect and judgment normal.     Labs:     Results for DIAZ BROWN (MRN [id number]) as of 10/20/2017 14:30   Ref. Range 5/10/2017 07:46   Sodium Latest Ref Range: 136 - 145 mmol/L 138   Potassium Latest Ref Range: 3.5 - 5.1 mmol/L 4.2   Chloride Latest Ref Range: 95 - 110 mmol/L 107   CO2 Latest Ref Range: 23 - 29 mmol/L 22 (L)   Anion Gap Latest Ref Range: 8 - 16 mmol/L 9   BUN, Bld Latest Ref Range: 6 - 20 mg/dL 8   Creatinine Latest Ref Range: 0.5 - 1.4 mg/dL 0.7   eGFR if non African American Latest Ref Range: >60 mL/min/1.73 m^2 >60.0   eGFR if  Latest Ref " Range: >60 mL/min/1.73 m^2 >60.0   Glucose Latest Ref Range: 70 - 110 mg/dL 91   Calcium Latest Ref Range: 8.7 - 10.5 mg/dL 9.5   Alkaline Phosphatase Latest Ref Range: 55 - 135 U/L 56   Total Protein Latest Ref Range: 6.0 - 8.4 g/dL 6.7   Albumin Latest Ref Range: 3.5 - 5.2 g/dL 3.7   Total Bilirubin Latest Ref Range: 0.1 - 1.0 mg/dL 0.6   AST Latest Ref Range: 10 - 40 U/L 11   ALT Latest Ref Range: 10 - 44 U/L 11   Triglycerides Latest Ref Range: 30 - 150 mg/dL 89   Cholesterol Latest Ref Range: 120 - 199 mg/dL 192   HDL Latest Ref Range: 40 - 75 mg/dL 36 (L)   LDL Cholesterol Latest Ref Range: 63.0 - 159.0 mg/dL 138.2   Total Cholesterol/HDL Ratio Latest Ref Range: 2.0 - 5.0  5.3 (H)   TSH Latest Ref Range: 0.400 - 4.000 uIU/mL 0.826   T3, Total Latest Ref Range: 60 - 180 ng/dL 91   Free T4 Latest Ref Range: 0.71 - 1.51 ng/dL 1.07   Thyroglobulin Interpretation Unknown SEE BELOW   Thyroglobulin Antibody Screen Latest Ref Range: <4.0 IU/mL <1.8   Thyroglobulin, Tumor Marker Latest Units: ng/mL 11 (H)   Thyroperoxidase Antibodies Latest Ref Range: <6.0 IU/mL <6.0   Thyroid-Stim IG Quantitative Latest Ref Range: <0.10 IU/L <0.10     Thyroid u/s 5/2017  Technique: Transverse and longitudinal gray scale ultrasound images of the thyroid gland with limited color Doppler analysis.    Findings:The right thyroid lobe measures approximately 6.4 x 2 x 2.4 cm. This is consistent with an estimated 16 mL volume. Normal thyroid vascularity is noted. There is heterogeneous echogenicity. There are multiple, subcentimeter hypoechoic nodules throughout the upper and lower right thyroid lobe measuring up to 4 mm.    The thyroid isthmus measures 0.5 cm in AP dimension. There is an approximately 4 mm hypoechoic nodule within the right side of the isthmus.    The left thyroid lobe measures approximately 6.5 x 1.9 x 2.5 cm, consistent with an estimated 16 mL volume. Normal vascularity to the left thyroid lobe is noted. There is  heterogeneous echogenicity. Multiple hypoechoic nodules are noted within the left thyroid lobe measuring up to 7 mm in the upper left thyroid lobe.   Impression       1.  Enlarged, multinodular thyroid gland with multiple subcentimeter nodules bilaterally measuring up to 7 mm. None of the nodules meets SRU criteria for recommended FNA.  2. Heterogeneous echogenicity to the bilateral thyroid gland. Normal Doppler vascularity noted. Mild thyroiditis is possible.     Assesment/Plan:    1. Multiple thyroid nodules  TSH    T4, free    Thyroglobulin    T3   2. Hidradenitis axillaris  HEMOGLOBIN A1C   3. Dysphagia, unspecified type  FL Esophagram Complete    Complete PFT w/ bronchodilator   4. Postmenopausal  Luteinizing hormone    Follicle stimulating hormone    FL Esophagram Complete     Multiple Thyroid nodules  Chronic-monitor  TFTs WNL    Hidradenitis axillaris  Chronic-worsening  Declined Ambulatory referral to Derm  Advised to use desiccants  Advised to wear cotton undergarments.    Dyshagia  Esophagram  PFT  May refer to GI    Postmenopausal  Chronic-stable-monitor    F/u in 6 months

## 2017-10-21 LAB
ESTIMATED AVG GLUCOSE: 100 MG/DL
HBA1C MFR BLD HPLC: 5.1 %

## 2017-10-23 LAB
THRYOGLOBULIN INTERPRETATION: ABNORMAL
THYROGLOB AB SERPL-ACNC: <1.8 IU/ML
THYROGLOB SERPL-MCNC: 12 NG/ML

## 2017-10-24 ENCOUNTER — TELEPHONE (OUTPATIENT)
Dept: FAMILY MEDICINE | Facility: CLINIC | Age: 52
End: 2017-10-24

## 2017-10-24 DIAGNOSIS — R13.10 DYSPHAGIA, UNSPECIFIED TYPE: Primary | ICD-10-CM

## 2017-10-24 NOTE — TELEPHONE ENCOUNTER
I agree that she should see GI. I will place the order and my staff will help her schedule an appointment. Thank you for seeing her.

## 2017-10-24 NOTE — TELEPHONE ENCOUNTER
----- Message from Nathan Phillips MD sent at 10/20/2017  4:46 PM CDT -----  Regarding: Re dysphagia  Martita Murry. We saw your patient earlier today and she has long standing dysphagia along with some clinical symptoms suggestive of possible reflux disease. Given her long history of smoking and heavy caffeinated product intake as well I wondered if you would want her seen by a GI specialist to get a formal EGD. If you are fine with it we could refer her to see one of our gastroenterologists as opposed to simply commencing her on treatment trial of a PPI.  We are working up her thyroid anatomy to be sure it does not play a role in her dysphagia but this does not appear to be clinically likely given its sonographic size and appearance.  Do let us know how you would prefer we proceed.  Thanks    Nathan Phillips

## 2017-10-25 ENCOUNTER — TELEPHONE (OUTPATIENT)
Dept: ENDOCRINOLOGY | Facility: CLINIC | Age: 52
End: 2017-10-25

## 2017-10-25 NOTE — TELEPHONE ENCOUNTER
----- Message from Shara Macias sent at 10/24/2017  3:46 PM CDT -----  Contact: self                           attn:  Kamla  Patient [phone] is returning call to Nurse Kamla from earlier today/please call

## 2017-10-25 NOTE — TELEPHONE ENCOUNTER
----- Message from Jeremiah Whipple PA-C sent at 10/25/2017 12:01 PM CDT -----  Please let Ms. Choi know that her thyroid hormones were normal. Her A1c is 5.1 %. Her FSH is elevated indicating that she is in perimenopause. We will recheck her FSH and LH next year.

## 2017-10-31 ENCOUNTER — INITIAL CONSULT (OUTPATIENT)
Dept: GASTROENTEROLOGY | Facility: CLINIC | Age: 52
End: 2017-10-31
Payer: COMMERCIAL

## 2017-10-31 VITALS
HEIGHT: 65 IN | WEIGHT: 200 LBS | HEART RATE: 90 BPM | BODY MASS INDEX: 33.32 KG/M2 | DIASTOLIC BLOOD PRESSURE: 71 MMHG | SYSTOLIC BLOOD PRESSURE: 148 MMHG

## 2017-10-31 DIAGNOSIS — Z12.11 SCREENING FOR COLON CANCER: ICD-10-CM

## 2017-10-31 DIAGNOSIS — R13.14 PHARYNGOESOPHAGEAL DYSPHAGIA: Primary | ICD-10-CM

## 2017-10-31 DIAGNOSIS — Z87.19 HISTORY OF CHRONIC CONSTIPATION: ICD-10-CM

## 2017-10-31 PROCEDURE — 99999 PR PBB SHADOW E&M-EST. PATIENT-LVL III: CPT | Mod: PBBFAC,,, | Performed by: NURSE PRACTITIONER

## 2017-10-31 PROCEDURE — 99243 OFF/OP CNSLTJ NEW/EST LOW 30: CPT | Mod: S$GLB,,, | Performed by: NURSE PRACTITIONER

## 2017-10-31 RX ORDER — POLYETHYLENE GLYCOL 3350 17 G/17G
17 POWDER, FOR SOLUTION ORAL DAILY
Qty: 510 G | Refills: 2 | Status: SHIPPED | OUTPATIENT
Start: 2017-10-31 | End: 2017-11-30

## 2017-10-31 NOTE — PROGRESS NOTES
Subjective:       Patient ID: Marsha Choi is a 52 y.o. female Body mass index is 33.28 kg/m².    Chief Complaint: Dysphagia (states been going on 18yrs, some constipation)    This patient is new to me.  Referring Provider:  Dr. Garza for dysphagia    GI Problem   Primary symptoms do not include fever, weight loss (gaining weight over the past 8 months), fatigue, abdominal pain, nausea, vomiting, diarrhea, melena, hematemesis, hematochezia or dysuria. The illness began more than 7 days ago (started 18 years ago with dysphagia). The problem has not changed since onset.  The illness is also significant for dysphagia (CHIEF COMPLAINT: started 18 years ago; worse with cold liquids feels like it gets stuck throat/esophagus; no problems with warm/hot liquids, food or pills) and constipation (chronic, started about 15 years ago; bowel movements once every 3-4 days, dulcolax prn; magnesium citrate and miralax minimal relief). The illness does not include chills or odynophagia. Significant associated medical issues include hemorrhoids. Associated medical issues do not include inflammatory bowel disease, GERD or PUD.     Review of Systems   Constitutional: Negative for appetite change, chills, fatigue, fever, unexpected weight change and weight loss (gaining weight over the past 8 months).   HENT: Positive for trouble swallowing (see HPI). Negative for sore throat.    Respiratory: Negative for cough, choking and shortness of breath.    Cardiovascular: Negative for chest pain.   Gastrointestinal: Positive for constipation (chronic, started about 15 years ago; bowel movements once every 3-4 days, dulcolax prn; magnesium citrate and miralax minimal relief), dysphagia (CHIEF COMPLAINT: started 18 years ago; worse with cold liquids feels like it gets stuck throat/esophagus; no problems with warm/hot liquids, food or pills) and rectal pain (occasional with bowel movements). Negative for abdominal pain, anal bleeding, blood  in stool, diarrhea, hematemesis, hematochezia, melena, nausea and vomiting.   Genitourinary: Negative for difficulty urinating, dysuria and flank pain.   Neurological: Negative for weakness.       Past Medical History:   Diagnosis Date    Allergy     Chest pain     negative stress test and heart cath    Headache     ALMA (obstructive sleep apnea)     Seasonal allergies      Past Surgical History:   Procedure Laterality Date    abscess removal from right side of neck      APPENDECTOMY      BREAST SURGERY Left     lumpectomy, benign    CARDIAC CATHETERIZATION      part of evaluation for chest pain, negative     SECTION      x1    ENDOMETRIAL ABLATION      TUBAL LIGATION      UPPER GASTROINTESTINAL ENDOSCOPY  prior to      Family History   Problem Relation Age of Onset    Multiple sclerosis Father     Diabetes Father     Heart disease Father 50     CAD    Diabetes Mother     Cancer Mother      breast    Heart disease Mother 50     CAD    Thyroid disease Daughter      hypothyroidism    Transient ischemic attack Sister     Colon cancer Neg Hx     Colon polyps Neg Hx     Crohn's disease Neg Hx     Esophageal cancer Neg Hx     Stomach cancer Neg Hx     Ulcerative colitis Neg Hx      Wt Readings from Last 10 Encounters:   10/31/17 90.7 kg (200 lb)   10/20/17 93.3 kg (205 lb 11 oz)   10/02/17 92 kg (202 lb 13.2 oz)   17 91.2 kg (201 lb 1 oz)   17 91.2 kg (201 lb 1 oz)   17 93 kg (205 lb 0.4 oz)   17 91.4 kg (201 lb 8 oz)   17 90.3 kg (199 lb 1.2 oz)   17 86.2 kg (190 lb)   17 90.4 kg (199 lb 4.7 oz)     Lab Results   Component Value Date    WBC 10.69 2017    HGB 15.0 2017    HCT 43.3 2017    MCV 90 2017     (H) 2017     CMP  Sodium   Date Value Ref Range Status   2017 139 136 - 145 mmol/L Final     Potassium   Date Value Ref Range Status   2017 4.2 3.5 - 5.1 mmol/L Final     Chloride   Date Value  Ref Range Status   07/13/2017 105 95 - 110 mmol/L Final     CO2   Date Value Ref Range Status   07/13/2017 26 23 - 29 mmol/L Final     Glucose   Date Value Ref Range Status   07/13/2017 94 70 - 110 mg/dL Final     BUN, Bld   Date Value Ref Range Status   07/13/2017 11 6 - 20 mg/dL Final     Creatinine   Date Value Ref Range Status   07/13/2017 0.7 0.5 - 1.4 mg/dL Final     Calcium   Date Value Ref Range Status   07/13/2017 9.6 8.7 - 10.5 mg/dL Final     Total Protein   Date Value Ref Range Status   07/04/2017 6.9 6.0 - 8.4 g/dL Final     Albumin   Date Value Ref Range Status   07/04/2017 3.9 3.5 - 5.2 g/dL Final     Total Bilirubin   Date Value Ref Range Status   07/04/2017 0.5 0.1 - 1.0 mg/dL Final     Comment:     For infants and newborns, interpretation of results should be based  on gestational age, weight and in agreement with clinical  observations.  Premature Infant recommended reference ranges:  Up to 24 hours.............<8.0 mg/dL  Up to 48 hours............<12.0 mg/dL  3-5 days..................<15.0 mg/dL  6-29 days.................<15.0 mg/dL       Alkaline Phosphatase   Date Value Ref Range Status   07/04/2017 51 (L) 55 - 135 U/L Final     AST   Date Value Ref Range Status   07/04/2017 14 10 - 40 U/L Final     ALT   Date Value Ref Range Status   07/04/2017 14 10 - 44 U/L Final     Anion Gap   Date Value Ref Range Status   07/13/2017 8 8 - 16 mmol/L Final     eGFR if    Date Value Ref Range Status   07/13/2017 >60.0 >60 mL/min/1.73 m^2 Final     eGFR if non    Date Value Ref Range Status   07/13/2017 >60.0 >60 mL/min/1.73 m^2 Final     Comment:     Calculation used to obtain the estimated glomerular filtration  rate (eGFR) is the CKD-EPI equation. Since race is unknown   in our information system, the eGFR values for   -American and Non--American patients are given   for each creatinine result.       Lab Results   Component Value Date    TSH 0.893 10/20/2017      Reviewed prior medical records including radiology report of 7/4/17 ct renal stone abdomen pelvis & endoscopy history (see surgical history).    Objective:      Physical Exam   Constitutional: She is oriented to person, place, and time. She appears well-developed and well-nourished. No distress.   HENT:   Mouth/Throat: Oropharynx is clear and moist and mucous membranes are normal. No oral lesions. No oropharyngeal exudate.   Eyes: Conjunctivae are normal. Pupils are equal, round, and reactive to light. No scleral icterus.   Cardiovascular: Normal rate.    Pulmonary/Chest: Effort normal and breath sounds normal. No respiratory distress. She has no wheezes.   Abdominal: Soft. Normal appearance and bowel sounds are normal. She exhibits no distension, no abdominal bruit and no mass. There is no hepatosplenomegaly. There is no tenderness. There is no rigidity, no rebound, no guarding, no tenderness at McBurney's point and negative Hilton's sign. No hernia.   Neurological: She is alert and oriented to person, place, and time.   Skin: Skin is warm and dry. No rash noted. She is not diaphoretic. No erythema. No pallor.   Non-jaundiced   Psychiatric: She has a normal mood and affect. Her behavior is normal. Judgment and thought content normal.   Nursing note and vitals reviewed.      Assessment:       1. Pharyngoesophageal dysphagia    2. History of chronic constipation    3. Screening for colon cancer        Plan:       Pharyngoesophageal dysphagia  - schedule EGD, discussed procedure with patient and possible esophageal dilation may be performed during procedure if indicated, patient verbalized understanding  - educated patient to eat smaller more frequent meals and to eat slowly and advised to eat a soft diet.  - possible UGI/esophagram/esophageal manometry if symptoms persist  -recommend to: avoid large meals, avoid eating within 2-3 hours of bedtime (avoid late night eating & lying down soon after eating), elevate  head of bed if nocturnal symptoms are present, smoking cessation (if current smoker), & weight loss (if overweight).   -Educated to avoid known foods which trigger reflux symptoms & to minimize/avoid high-fat foods, chocolate, caffeine, citrus, alcohol, & tomato products.  -Advised to avoid/limit use of NSAID's, since they can cause GI upset, bleeding, and/or ulcers. If needed, take with food.     History of chronic constipation  -     X-Ray Abdomen Flat And Erect; Future; Expected date: 10/31/2017  -  START   polyethylene glycol (GLYCOLAX) 17 gram/dose powder; Take 17 g by mouth once daily.  Dispense: 510 g; Refill: 2  Recommended daily exercise as tolerated, adequate water intake (six 8-oz glasses of water daily), and high fiber diet. OTC fiber supplements are recommended if diet does not reach daily fiber goal (25 grams daily), such as Metamucil, Citrucel, or FiberCon (take as directed, separate from other oral medications by >2 hours).  -Recommend taking an OTC stool softener such as Colace as directed to avoid hard stools and straining with bowel movements PRN  - If no improvement with above recommendations, try intermittently dosed Dulcolax OTC as directed (every 3-4  days) PRN to facilitate bowel movements  -If still no improvement with these measures, call/follow-up  - schedule Colonoscopy, discussed procedure with the patient, patient verbalized understanding    Screening for colon cancer  - schedule Colonoscopy, discussed procedure with the patient, patient verbalized understanding    Return in about 1 month (around 11/30/2017), or if symptoms worsen or fail to improve.      If no improvement in symptoms or symptoms worsen, call/follow-up at clinic or go to ER.

## 2017-10-31 NOTE — PATIENT INSTRUCTIONS
Discharge Instructions: Eating a Soft Diet  You have been prescribed a soft diet (also called gastrointestinal soft diet or bland diet). This reduces the amount of work your digestive tract has to do. It also reduces the chance that your digestive tract will be irritated by the food you eat. A soft diet is prescribed for people with digestive problems. The diet consists of foods that are tender, mildly seasoned, and easy to digest. While on this diet, you should not eat fried or spicy foods, or raw fruits and vegetables. Also avoid alcoholic beverages.  General guidelines  · Eat in a calm, relaxed atmosphere. How you eat may be as important as what you eat. Dont rush while eating. Chew your food slowly and thoroughly, and swallow slowly.  · Eat small frequent meals throughout the day, but dont eat within 2 hours of bedtime.  · Avoid any foods that cause discomfort.  · Dont use NSAIDs (nonsteroidal anti-inflammatory drugs), such as aspirin, and ibuprofen. Also avoid medicine that contain aspirin. NSAIDs can cause ulcers and delay or prevent ulcer healing.  · Use antacids as needed, but keep in mind that magnesium-containing antacids may cause diarrhea.  Foods to eat  · Cream of wheat and cream of rice  · Cooked white rice  · Mashed potatoes, and boiled potatoes without skin  · Plain pasta and noodles  · Plain white crackers (such as no-salt soda crackers)  · White bread  · Applesauce  · Cooked fruits without skins or seeds  · Mild juices, such as apple and grape  · Bananas  · Cooked or mashed vegetables without stems and seeds  ¨ Carrots  ¨ Summer squash (zucchini, yellow squash)  ¨ Winter squash (acorn, butternut, spaghetti squash)  · Cottage cheese  · Mild hard or soft cheeses  · Custard  · Yogurt without seeds or nuts  · Milk (you may need lactose-free milk)  · Ice cream without seeds or nuts  · Smooth peanut butter  · Eggs  · Fish, turkey, chicken, or other meat that is not tough or stringy  · Tofu  Foods to  avoid  · Nuts and seeds  · Snack foods, such as the following:  ¨ Chocolate-containing snacks, candy, pastries, or cakes.  ¨ Potato chips (plain, barbecued, or other flavors)  ¨ Taco chips or nachos  ¨ Corn chips  ¨ Popcorn, popcorn cakes, or rice cakes  ¨ Crackers with nuts, seeds, or spicy seasonings  ¨ French fries  · Fried or greasy foods  · Whole-grain breads, rolls, and crackers  · Breads and rolls with nuts, seeds, or bran  · Bran and granola cereals  · Berries with seeds, such as strawberries, raspberries, and blackberries  · Acidic fruits, such as oranges, grapefruits, papo, limes, and pineapples  · Raw vegetables  · Mild or hot peppers  · Sauerkraut and pickled vegetables  · Tomatoes or tomato products, such as tomato paste, tomato sauce, and tomato juice  · Barbecue sauce  · Spicy or flavored cheeses, such as jalapeño and black pepper cheese  · Crunchy peanut butter  · Dried cooked beans, such as casas, kidney, or navy beans  · The following meats:  ¨ Fried or greasy meats  ¨ Processed, spicy meats, such as sausage, borjas, ham, and lunch meats  ¨ Ribs and other meats with barbecue sauce  ¨ Tough or stringy meats, such as corned beef or beef jerky  Fluids to avoid  · Alcoholic beverages  · Coffee and regular teas  · Sav and other drinks with caffeine  · Cranberry, orange, pineapple, and grapefruit juice  · Lemonade  · Vegetable juice  · Whole milk, if you are lactose intolerant  Follow-up  Make a follow-up appointment with a dietitian as directed by our staff.  Date Last Reviewed: 6/21/2015  © 6367-6960 [employer]. [address] [place] [address], [place] [address]. All rights reserved. This information is not intended as a substitute for professional medical care. Always follow your healthcare professional's instructions.        Constipation (Adult)  Constipation means that you have bowel movements that are less frequent than usual. Stools often become very hard and difficult to  pass.  Constipation is very common. At some point in life it affects almost everyone. Since everyone's bowel habits are different, what is constipation to one person may not be to another. Your healthcare provider may do tests to diagnose constipation. It depends on what he or she finds when evaluating you.    Symptoms of constipation include:  · Abdominal pain  · Bloating  · Vomiting  · Painful bowel movements  · Itching, swelling, bleeding, or pain around the anus  Causes  Constipation can have many causes. These include:  · Diet low in fiber  · Too much dairy  · Not drinking enough liquids  · Lack of exercise or physical activity. This is especially true for older adults.  · Changes in lifestyle or daily routine, including pregnancy, aging, work, and travel  · Frequent use or misuse of laxatives  · Ignoring the urge to have a bowel movement or delaying it until later  · Medicines, such as certain prescription pain medicines, iron supplements, antacids, certain antidepressants, and calcium supplements  · Diseases like irritable bowel syndrome, bowel obstructions, stroke, diabetes, thyroid disease, Parkinson disease, hemorrhoids, and colon cancer  Complications  Potential complications of constipation can include:  · Hemorrhoids  · Rectal bleeding from hemorrhoids or anal fissures (skin tears)  · Hernias  · Dependency on laxatives  · Chronic constipation  · Fecal impaction  · Bowel obstruction or perforation  Home care  All treatment should be done after talking with your healthcare provider. This is especially true if you have another medical problems, are taking prescription medicines, or are an older adult. Treatment most often involves lifestyle changes. You may also need medicines. Your healthcare provider will tell you which will work best for you. Follow the advice below to help avoid this problem in the future.  Lifestyle changes  These lifestyle changes can help prevent constipation:  · Diet. Eat a  high-fiber diet, with fresh fruit and vegetables, and reduce dairy intake, meats, and processed foods  · Fluids. It's important to get enough fluids each day. Drink plenty of water when you eat more fiber. If you are on diet that limits the amount of fluid you can have, talk about this with your healthcare provider.  · Regular exercise. Check with your healthcare provider first.  Medications  Take any medicines as directed. Some laxatives are safe to use only every now and then. Others can be taken on a regular basis. Talk with your doctor or pharmacist if you have questions.  Prescription pain medicines can cause constipation. If you are taking this kind of medicine, ask your healthcare provider if you should also take a stool softener.  Medicines you may take to treat constipation include:  · Fiber supplements  · Stool softeners  · Laxatives  · Enemas  · Rectal suppositories  Follow-up care  Follow up with your healthcare provider if symptoms don't get better in the next few days. You may need to have more tests or see a specialist.  Call 911  Call 911 if any of these occur:  · Trouble breathing  · Stiff, rigid abdomen that is severely painful to touch  · Confusion  · Fainting or loss of consciousness  · Rapid heart rate  · Chest pain  When to seek medical advice  Call your healthcare provider right away if any of these occur:  · Fever over 100.4°F (38°C)  · Failure to resume normal bowel movements  · Pain in your abdomen or back gets worse  · Nausea or vomiting  · Swelling in your abdomen  · Blood in the stool  · Black, tarry stool  · Involuntary weight loss  · Weakness  Date Last Reviewed: 12/30/2015  © 7909-6016 [employer] [address] [place] [address], [place] [address]. All rights reserved. This information is not intended as a substitute for professional medical care. Always follow your healthcare professional's instructions.

## 2017-10-31 NOTE — LETTER
November 2, 2017      Lovely Garza MD  [address]  [place] [address]           [facility]  [address]  [place] [address]  Phone: [phone]          Patient: Marsha Choi   MR Number: [id number]   YOB: 1965   Date of Visit: 10/31/2017       Dear Dr. Lovely Garza:    Thank you for referring Marsha Choi to me for evaluation. Attached you will find relevant portions of my assessment and plan of care.    If you have questions, please do not hesitate to call me. I look forward to following Marsha Choi along with you.    Sincerely,    Gina Cade, [facility]    Enclosure  CC:  No Recipients    If you would like to receive this communication electronically, please contact [email or web address] or [phone] to request more information on [employer] access.    For providers and/or their staff who would like to refer a patient to [facility], please contact us through our one-stop-shop provider referral line, [facility], at [phone].    If you feel you have received this communication in error or would no longer like to receive these types of communications, please e-mail [email or web address]

## 2017-11-30 ENCOUNTER — OFFICE VISIT (OUTPATIENT)
Dept: NEUROLOGY | Facility: CLINIC | Age: 52
End: 2017-11-30
Payer: COMMERCIAL

## 2017-11-30 VITALS
BODY MASS INDEX: 34.34 KG/M2 | SYSTOLIC BLOOD PRESSURE: 119 MMHG | DIASTOLIC BLOOD PRESSURE: 71 MMHG | HEIGHT: 65 IN | WEIGHT: 206.13 LBS | RESPIRATION RATE: 16 BRPM | HEART RATE: 95 BPM

## 2017-11-30 DIAGNOSIS — G47.33 OSA (OBSTRUCTIVE SLEEP APNEA): ICD-10-CM

## 2017-11-30 DIAGNOSIS — G43.119 INTRACTABLE MIGRAINE WITH AURA WITHOUT STATUS MIGRAINOSUS: Primary | ICD-10-CM

## 2017-11-30 PROCEDURE — 99214 OFFICE O/P EST MOD 30 MIN: CPT | Mod: S$GLB,,, | Performed by: PSYCHIATRY & NEUROLOGY

## 2017-11-30 PROCEDURE — 99999 PR PBB SHADOW E&M-EST. PATIENT-LVL III: CPT | Mod: PBBFAC,,, | Performed by: PSYCHIATRY & NEUROLOGY

## 2017-11-30 RX ORDER — LAMOTRIGINE 25 MG/1
TABLET ORAL
Qty: 180 TABLET | Refills: 11 | Status: SHIPPED | OUTPATIENT
Start: 2017-11-30 | End: 2018-04-17 | Stop reason: SDUPTHER

## 2017-11-30 NOTE — LETTER
November 30, 2017      [facility]  [address] [facility] [address]  [place] [address]  Phone: [phone]  Fax: [phone]       Patient: Marsha Choi   YOB: 1965  Date of Visit: 11/30/2017    To Whom It May Concern:    Ms Marsha Choi suffers with frequent disabling migraine with aura which is mainly precipitated by stress from driving. It would be beneficial to allow her to work remotely to eliminate her main trigger.    Please let me know if further information is needed.    Sincerely,          Rosa Fischer MD

## 2017-11-30 NOTE — PROGRESS NOTES
Subjective:       Patient ID: Marsha Khalil is a 51 y.o. female.    Chief Complaint: Headaches   INTERVAL HISTORY  The patient presents for follow up regarding migraine headache with and without aura. She is doing better since placed on prevention with Lamictal, now taking 50 mg BID. She is also responding better to Imitrex and has not had to use Fioricet. The last severe attack was at the end of October. She describes stress as her main trigger. This is brought on by driving to [place], she gets stress and develops a visual aura whic affects her field of vision. She has been told by her supervisor that she can work remotely if medical documentation is provided. Otherwise, headache characteristics are unchanged. Please see below.    HPI  The patient is a pleasant 52 y/o female presenting with chief complaint of headache. They started after the birth of her last child 18 years ago. The location varies and migrates from one side to another, posterior or anterior or pancephalic. She has never taken preventive medication or migraine specific medications. She treats with tylenol or NSAIDs with partial relief. The duration varied from 3 to 4 hours to 3 to 4 days. She is more worried about episodes consisting of scintillating scotoma, described as a typical aura followed by a severe headache. She has had an ECHO in the past but not with bubble contrast. She has had a recent MRI of the brain which was negative, revealing microischemic changes as expected for her age. Her father suffered from MS. She has undergone extensive work up for MS with negative results.  Please see details of headache characteristics below.  Headache questionnaire     1. When did your Headaches start?                         1999 with the birth of last child        2. Where are your headaches located?                        Both sides around the back of neck         3. Your headache's characteristics:                        Excruciating,  Pressure, Throbbing, Pounding, Sharp        4. How long does the headache last?                        days        5. How often does the headache occur?                        0 varies         6. Are your headaches preceded or accompanied by other symptoms? yes                        If yes, please describe.  Blurry vision kaleidoscope affect , peripheral vision disappears         7. Does the headache awaken you at night? yes                        If so, how often? Once or twice per month            8. Please bev the word that best describes your headache's intensity:                         severe        9. Using a scale of 1 through 10, with 0 = no pain and 10 = the worst pain:                        What score is your headache now? 2                        What score is your headache at its worst? 10                        What score is your headache at its best? 2           10. Possible associated headache symptoms:  [x]  Sensitivity to light                                      [] Dizziness                                        [] Nasal or sinus pressure/ pain                        [x] Sensitivity to noise                                     [] Vertigo                                            [x] Problems with concentration  [] Sensitivity to smells             [] Ringing in ears                     [] Problems with memory                                            [x] Blurred vision                                 [x] Irritability                                         [x] Problems with task completion   [] Double vision                                 [] Anger                                  [x]  Problems with relaxation  [] Loss of appetite                                         [] Anxiety                                           [x] Neck tightness, Neck pain  [x] Nausea                                                                 [] Nasal congestion  [] Vomiting                                                                        11. Headache improving factors:  [] Sleep                                  [x] Heat  [x] Darkness                                        [x] Ice  [] Local pressure                     [] Menses (period)  [x] Massage                                        [x] Medications:  advil and tylenol        12. Headache worsening factors:   [] Fatigue                     [] Sneezing                                        [x] Changes in Weather  [x] Light             [x] Bending Over           [x] Stress  [] Noise            [] Ovulation                                        [] Multiple Sclerosis Flare-Up  [] Smells                      [] Menses                                          [] Food   [x] Coughing                  [] Alcohol        13. Number of caffeinated drinks per day: 2        14. Number of diet drinks per day:  0     Review of Systems   Constitutional: Positive for fatigue. Negative for activity change, appetite change and fever.   HENT: Negative for congestion, dental problem, hearing loss, sinus pressure, tinnitus, trouble swallowing and voice change.    Eyes: Negative for photophobia, pain, redness and visual disturbance (visual aura).   Respiratory: Negative for cough, chest tightness and shortness of breath.    Cardiovascular: Negative for chest pain, palpitations and leg swelling.   Gastrointestinal: Positive for nausea. Negative for abdominal pain, blood in stool and vomiting.   Endocrine: Positive for cold intolerance. Negative for heat intolerance.   Genitourinary: Negative for difficulty urinating, frequency, menstrual problem and urgency.   Musculoskeletal: Negative for arthralgias, back pain, gait problem, joint swelling, myalgias, neck pain and neck stiffness.   Skin: Negative.    Neurological: Negative for dizziness, tremors, seizures, syncope, facial asymmetry, speech difficulty, weakness, light-headedness, numbness and headaches.   Hematological: Negative  for adenopathy. Does not bruise/bleed easily.   Psychiatric/Behavioral: Negative for agitation, behavioral problems, confusion, decreased concentration, self-injury, sleep disturbance and suicidal ideas. The patient is not nervous/anxious and is not hyperactive.          Past Medical History:   Diagnosis Date    Allergy     Chest pain     negative stress test and heart cath    Headache     ALMA (obstructive sleep apnea)     Seasonal allergies      Past Surgical History:   Procedure Laterality Date    abscess removal from right side of neck      APPENDECTOMY      BREAST SURGERY Left     lumpectomy, benign    CARDIAC CATHETERIZATION      part of evaluation for chest pain, negative     SECTION      x1    ENDOMETRIAL ABLATION      TUBAL LIGATION       Family History   Problem Relation Age of Onset    Multiple sclerosis Father     Diabetes Father     Heart disease Father 50     CAD    Diabetes Mother     Cancer Mother      breast    Heart disease Mother 50     CAD    Thyroid disease Daughter      hypothyroidism    Transient ischemic attack Sister      Social History     Social History    Marital status: Significant Other     Spouse name: N/A    Number of children: N/A    Years of education: N/A     Occupational History    Not on file.     Social History Main Topics    Smoking status: Current Every Day Smoker     Packs/day: 1.00     Years: 30.00     Types: Cigarettes    Smokeless tobacco: Never Used    Alcohol use No    Drug use: No    Sexual activity: Yes     Partners: Male     Other Topics Concern    Not on file     Social History Narrative    No narrative on file     Review of patient's allergies indicates:  No Known Allergies    Current Outpatient Prescriptions:     butalbital-aspirin-caffeine -40 mg (FIORINAL) -40 mg Cap, Take 1 capsule by mouth every 6 (six) hours as needed., Disp: 12 capsule, Rfl: 2    cetirizine (ZYRTEC) 10 MG tablet, Take 10 mg by mouth once  daily., Disp: , Rfl:     ibuprofen (ADVIL,MOTRIN) 200 MG tablet, Take 600 mg by mouth 2 (two) times daily as needed for Pain., Disp: , Rfl:     lamotrigine (LAMICTAL) 25 MG tablet, Take 1 tablet daily for 1 week, then take 1 po BID for 1 week, then take 2 tablets BID for initial goal of 50 mg BID, Disp: 120 tablet, Rfl: 11    sumatriptan (IMITREX) 50 MG tablet, Take 1 at onset of headache, may repeat in 2 hours to a max of 3 per day, 2 days per week, Disp: 9 tablet, Rfl: 3      Objective:      Vitals:    11/30/17 1302   BP: 119/71   Pulse: 95   Resp: 16     Body mass index is 34.3 kg/m².    Physical Exam    Constitutional:   She appears well-developed and well-nourished. She is well groomed    HENT:    Head: Atraumatic, oral and nasal mucosa intact  Eyes: Conjunctivae and EOM are normal. Pupils are equal, round, and reactive to light OU  Neck: Neck supple. No thyromegaly present  Cardiovascular: Normal rate and normal heart sounds  No murmur heard  Pulmonary/Chest: Effort normal and breath sounds normal  Musculoskeletal: Normal range of motion. No joint stiffness. No vertebral point tenderness  Skin: Skin is warm and dry  Psychiatric: Normal mood and affect     Neuro exam:    Mental status:  Awake, attentive, Alert, oriented to self, place, year and month  Language function is intact    Cranial Nerves:  Smell was not formally evaluated  Cranial Nerves II - XII: intact  Pursuits were smooth, normal saccades, no nystagmus OU  Funduscopic exam - disc were flat and pink, no exudates or hemorrhages OU  Motor - facial movement was symmetrical and normal     Palate moved well and was symmetrical with normal palatal and oral sensation  Tongue movements were full    Coordination:     Rapid alternating movements and rapid finger tapping - normal bilaterally  Finger to nose - normal and symmetric bilaterally   Heel to shin test - normal and symmetric bilaterally   Arm roll - smooth and symmetric   No intentional or  positional tremor.     Motor:  Normal muscle bulk and symmetry. No fasciculations were noted    No pronator drift  Strength 5/5 bilaterally     Reflexes:  Tendon reflexes were 2 + at biceps, triceps, brachioradialis, patellar, and Achilles bilaterally  No clonus was noted     Sensory: Intact to light touch, pin prick in all extremities.     Gait: Romberg absent. Normal gait. Normal arm swing and turns. Good tandem    Review of Data:  Lab Results   Component Value Date     07/13/2017    K 4.2 07/13/2017     07/13/2017    CO2 26 07/13/2017    BUN 11 07/13/2017    CREATININE 0.7 07/13/2017    GLU 94 07/13/2017    AST 14 07/04/2017    ALT 14 07/04/2017    ALBUMIN 3.9 07/04/2017    PROT 6.9 07/04/2017    BILITOT 0.5 07/04/2017    CHOL 192 05/10/2017    HDL 36 (L) 05/10/2017    LDLCALC 138.2 05/10/2017    TRIG 89 05/10/2017       Lab Results   Component Value Date    WBC 10.69 07/13/2017    HGB 15.0 07/13/2017    HCT 43.3 07/13/2017    MCV 90 07/13/2017     (H) 07/13/2017       Lab Results   Component Value Date    TSH 0.826 05/10/2017     Results for orders placed or performed during the hospital encounter of 05/12/17   MRA Brain    Narrative    Technique:Noncontrast 3-D time-of-flight MRA head with review of axial source images and maximum intensity projection reconstructions      Comparison:None available at this institution     Findings:The quality of the study is good. The visualized cervical, petrous, cavernous and supraclinoid internal carotid arteries are unremarkable. The bilateral anterior cerebral and anterior communicating arteries are unremarkable. The bilateral middle cerebral arteries are patent and symmetric without evidence for stenosis.    The vertebral arteries are codominant. No vertebral artery stenosis or dissection is seen. The visualized inferior and superior cerebellar arteries are unremarkable. The basilar artery is unremarkable. The bilateral posterior cerebral arteries are  unremarkable. There is a small, patent right posterior communicating artery. The left posterior communicating artery appears to be small or absent.    No intracranial aneurysm is identified.    Impression    1. Normal MRA of the brain. Specifically, no identifiable intracranial aneurysm is seen. There is reportedly a previous diagnosis of a 1 mm aneurysm which would be difficult to reliably diagnose by MRA.    Epic notification system activated.      Electronically signed by: Elizabeth Thayer MD  Date:     05/12/17  Time:    16:58    Results for orders placed or performed during the hospital encounter of 05/12/17   MRI Brain W WO Contrast    Narrative    Comparison:None available at this institution    Technique: Sagittal T1, axial T1, T2, flair, T2 gradient echo and diffusion and 3 plane postcontrast T1-weighted sequences of the brain. 9 ml Gadavist was administered intravenously for the contrast sequences.    Findings:No reduced diffusion is identified to suggest acute ischemia. The pituitary gland and corpus callosum are unremarkable. No focal abnormal magnetic susceptibility is identified to suggest abnormal parenchymal blood products. No abnormal T1 shortening is identified. No extra-axial fluid collection, mass effect or midline shift is seen. Normal ventricular size is noted. No acute posterior fossa abnormality is seen. No IAC abnormality is seen. Orbital soft tissues are unremarkable. The paranasal sinuses and mastoid air cells appear clear. Slight leftward nasal septal deviation noted. There are a few, small, foci of round T2 and flair hyperintensity in the subcortical white matter of the bilateral frontal and parietal lobes, to a degree which is common in this age group. No focal abnormal contrast enhancement is identified. T2 arterial and dural venous sinus flow voids appear maintained which will be better evaluated on dedicated MRA from the same day. No calvarial or clival abnormality is seen.     Impression    1. No acute abnormality is seen.  2. Mild, nonspecific bilateral subcortical chronic white matter disease to a degree which is common in this age group. Findings commonly reflects small vessel disease such as arteriolosclerosis.      Electronically signed by: Elizabeth Thayer MD  Date:     05/12/17  Time:    17:00                Assessment and Plan   Migraine with aura manifesting with scintillating scotoma. Due to the high frequency of attacks, 3 out of 5 migraines, Will obtain a 2D ECHO with bubble contrast looking for a PFO or ASD which are commonly found in association with migraine with aura. For prevention, Increase Lamictal to 75 mg BID. For the acute attack, trial of sumatriptan as first line and limited Fiorinal for rescue.  Letter generated for her supervisor to allow her to work remotely.  Migraine without aura. Same plan as above  Seasonal allergies  ALMA  I have discussed the side effects of the medications prescribed and the patient acknowledges understanding      RTC in 3 months with headache diary  Florentin Fischer M.D  Medical Director, Headache and Facial Pain  [facility]

## 2018-01-22 ENCOUNTER — DOCUMENTATION ONLY (OUTPATIENT)
Dept: FAMILY MEDICINE | Facility: CLINIC | Age: 53
End: 2018-01-22

## 2018-01-22 ENCOUNTER — HOSPITAL ENCOUNTER (OUTPATIENT)
Dept: RADIOLOGY | Facility: CLINIC | Age: 53
Discharge: HOME | End: 2018-01-22
Attending: PHYSICIAN ASSISTANT
Payer: COMMERCIAL

## 2018-01-22 ENCOUNTER — OFFICE VISIT (OUTPATIENT)
Dept: FAMILY MEDICINE | Facility: CLINIC | Age: 53
End: 2018-01-22
Payer: COMMERCIAL

## 2018-01-22 VITALS
SYSTOLIC BLOOD PRESSURE: 131 MMHG | WEIGHT: 205 LBS | TEMPERATURE: 99 F | HEIGHT: 65 IN | HEART RATE: 92 BPM | DIASTOLIC BLOOD PRESSURE: 84 MMHG | BODY MASS INDEX: 34.16 KG/M2

## 2018-01-22 DIAGNOSIS — J20.9 BRONCHITIS WITH BRONCHOSPASM: ICD-10-CM

## 2018-01-22 DIAGNOSIS — K04.7 TOOTH ABSCESS: ICD-10-CM

## 2018-01-22 DIAGNOSIS — J20.9 BRONCHITIS WITH BRONCHOSPASM: Primary | ICD-10-CM

## 2018-01-22 LAB
CTP QC/QA: YES
CTP QC/QA: YES
FLUAV AG NPH QL: NEGATIVE
FLUBV AG NPH QL: NEGATIVE
S PYO RRNA THROAT QL PROBE: NEGATIVE

## 2018-01-22 PROCEDURE — 94640 AIRWAY INHALATION TREATMENT: CPT | Mod: S$GLB,,, | Performed by: FAMILY MEDICINE

## 2018-01-22 PROCEDURE — 71046 X-RAY EXAM CHEST 2 VIEWS: CPT | Mod: TC,FY,PO

## 2018-01-22 PROCEDURE — 71046 X-RAY EXAM CHEST 2 VIEWS: CPT | Mod: 26,,, | Performed by: RADIOLOGY

## 2018-01-22 PROCEDURE — 87880 STREP A ASSAY W/OPTIC: CPT | Mod: QW,S$GLB,, | Performed by: PHYSICIAN ASSISTANT

## 2018-01-22 PROCEDURE — 99214 OFFICE O/P EST MOD 30 MIN: CPT | Mod: S$GLB,,, | Performed by: PHYSICIAN ASSISTANT

## 2018-01-22 PROCEDURE — 99999 PR PBB SHADOW E&M-EST. PATIENT-LVL IV: CPT | Mod: PBBFAC,,, | Performed by: PHYSICIAN ASSISTANT

## 2018-01-22 PROCEDURE — 87804 INFLUENZA ASSAY W/OPTIC: CPT | Mod: QW,S$GLB,, | Performed by: PHYSICIAN ASSISTANT

## 2018-01-22 RX ORDER — ALBUTEROL SULFATE 1.25 MG/3ML
1.25 SOLUTION RESPIRATORY (INHALATION)
Status: COMPLETED | OUTPATIENT
Start: 2018-01-22 | End: 2018-01-22

## 2018-01-22 RX ORDER — METHYLPREDNISOLONE 4 MG/1
TABLET ORAL
Qty: 1 PACKAGE | Refills: 0 | Status: SHIPPED | OUTPATIENT
Start: 2018-01-22 | End: 2018-01-29

## 2018-01-22 RX ORDER — AMOXICILLIN AND CLAVULANATE POTASSIUM 875; 125 MG/1; MG/1
1 TABLET, FILM COATED ORAL EVERY 12 HOURS
Qty: 20 TABLET | Refills: 0 | Status: SHIPPED | OUTPATIENT
Start: 2018-01-22 | End: 2018-01-29 | Stop reason: ALTCHOICE

## 2018-01-22 RX ADMIN — ALBUTEROL SULFATE 1.25 MG: 1.25 SOLUTION RESPIRATORY (INHALATION) at 04:01

## 2018-01-22 NOTE — PROGRESS NOTES
Subjective:       Patient ID: Marsha Choi is a 52 y.o. female.    Chief Complaint: Cough and Sinus Problem    Cough   This is a new problem. The current episode started in the past 7 days. The problem has been gradually worsening. The cough is non-productive. Associated symptoms include postnasal drip, a sore throat, shortness of breath and wheezing. Pertinent negatives include no chest pain, ear congestion, ear pain, fever, myalgias, nasal congestion, rhinorrhea, sweats or weight loss. Associated symptoms comments: Body aches; teeth pan. Nothing aggravates the symptoms. Risk factors for lung disease include travel. Treatments tried: tylenol sinus/flu; robutussin DM  The treatment provided no relief.     Review of Systems   Constitutional: Negative for activity change, appetite change, fatigue, fever and weight loss.   HENT: Positive for postnasal drip and sore throat. Negative for congestion, ear pain and rhinorrhea.    Respiratory: Positive for cough, shortness of breath and wheezing. Negative for choking, chest tightness and stridor.    Cardiovascular: Negative for chest pain and palpitations.   Gastrointestinal: Negative for abdominal pain, anal bleeding, nausea and vomiting.   Musculoskeletal: Negative for myalgias.       Objective:      Physical Exam   Constitutional: Vital signs are normal. She appears well-developed and well-nourished. No distress.   HENT:   Head: Normocephalic and atraumatic.   Right Ear: Hearing, external ear and ear canal normal. Tympanic membrane is injected.   Left Ear: Hearing, external ear and ear canal normal. Tympanic membrane is injected.   Nose: Mucosal edema and rhinorrhea present. Right sinus exhibits maxillary sinus tenderness. Right sinus exhibits no frontal sinus tenderness. Left sinus exhibits maxillary sinus tenderness. Left sinus exhibits no frontal sinus tenderness.   Mouth/Throat: Uvula is midline and mucous membranes are normal. Posterior oropharyngeal erythema  present.   Cardiovascular: Normal rate, regular rhythm, S1 normal, S2 normal and normal heart sounds.  Exam reveals no gallop.    No murmur heard.  Pulses:       Radial pulses are 2+ on the right side, and 2+ on the left side.        Pulmonary/Chest: Effort normal. No respiratory distress. She has no decreased breath sounds. She has wheezes in the right upper field and the left upper field. She has no rhonchi.   Deep, wet cough without production     Skin: Skin is warm and dry. She is not diaphoretic.   Appropriate skin turgor   Psychiatric: She has a normal mood and affect. Her speech is normal and behavior is normal. Judgment and thought content normal. Cognition and memory are normal.       Assessment:       1. Bronchitis with bronchospasm    2. Tooth abscess        Plan:   Marsha was seen today for cough and sinus problem.    Diagnoses and all orders for this visit:    Bronchitis with bronchospasm  -     albuterol nebulizer solution 1.25 mg; Take 3 mLs (1.25 mg total) by nebulization one time.  -     X-Ray Chest PA And Lateral; Future  -     POCT Rapid Strep A  -     POCT Influenza A/B  -     methylPREDNISolone (MEDROL DOSEPACK) 4 mg tablet; use as directed  -     amoxicillin-clavulanate 875-125mg (AUGMENTIN) 875-125 mg per tablet; Take 1 tablet by mouth every 12 (twelve) hours.  Take antibiotics with food.  Increase fluid intake.  Call the clinic if symptoms worsen, new symptoms develop or if you are not any better after completion of your antibiotics.        Tooth abscess  -     amoxicillin-clavulanate 875-125mg (AUGMENTIN) 875-125 mg per tablet; Take 1 tablet by mouth every 12 (twelve) hours.

## 2018-01-22 NOTE — PROGRESS NOTES
2 patient identifiers used ( name &  ).  Administered Albuterol nebulizer treatment.  Patient tolerated well.

## 2018-01-22 NOTE — PROGRESS NOTES
Pre-Visit Chart Review  For Appointment Scheduled on 01/22/18    Health Maintenance Due   Topic Date Due    Hepatitis C Screening  1965    Pneumococcal PPSV23 (Medium Risk) (1) 10/22/1983    Pap Smear with HPV Cotest  10/22/1986    Mammogram  10/22/2005    Colonoscopy  10/22/2015

## 2018-01-24 ENCOUNTER — TELEPHONE (OUTPATIENT)
Dept: FAMILY MEDICINE | Facility: CLINIC | Age: 53
End: 2018-01-24

## 2018-01-24 RX ORDER — HYDROCODONE POLISTIREX AND CHLORPHENIRAMINE POLISTIREX 10; 8 MG/5ML; MG/5ML
5 SUSPENSION, EXTENDED RELEASE ORAL EVERY 12 HOURS PRN
Qty: 115 ML | Refills: 0 | Status: SHIPPED | OUTPATIENT
Start: 2018-01-24 | End: 2018-04-10

## 2018-01-24 RX ORDER — FLUTICASONE PROPIONATE 220 UG/1
1 AEROSOL, METERED RESPIRATORY (INHALATION) 2 TIMES DAILY
Qty: 12 G | Refills: 0 | Status: SHIPPED | OUTPATIENT
Start: 2018-01-24 | End: 2018-04-10

## 2018-01-24 NOTE — TELEPHONE ENCOUNTER
Patient states she was told to call office if not better after a few days.  She states she is not getting any better.  She started taking the antibiotics two days ago.  She also states she will need a note for work because when she coughs she urinates on herself. Please advise

## 2018-01-24 NOTE — TELEPHONE ENCOUNTER
----- Message from Josephine Fortune sent at 1/24/2018  9:20 AM CST -----  Contact: self  Patient called regarding previous appt on Monday and advise not any better to have nurse to contact. Please contact [phone] (home)

## 2018-01-24 NOTE — TELEPHONE ENCOUNTER
----- Message from Marilu Whitaker sent at 1/24/2018  1:05 PM CST -----  Patient was seen on Monday, January 22nd for upper respiratory issues/stated still coughing and ribs are hurting/please call patient back at [phone] to advise.

## 2018-01-24 NOTE — TELEPHONE ENCOUNTER
I would like her to start an inhaler for 2 weeks. Flovent BID. She can watch a video online about how to use this. Also, I will send in a cough medication for night time use. Make a 1 week f/u.

## 2018-01-29 ENCOUNTER — OFFICE VISIT (OUTPATIENT)
Dept: FAMILY MEDICINE | Facility: CLINIC | Age: 53
End: 2018-01-29
Payer: COMMERCIAL

## 2018-01-29 ENCOUNTER — HOSPITAL ENCOUNTER (OUTPATIENT)
Dept: RADIOLOGY | Facility: CLINIC | Age: 53
Discharge: HOME | End: 2018-01-29
Attending: PHYSICIAN ASSISTANT
Payer: COMMERCIAL

## 2018-01-29 VITALS
HEART RATE: 75 BPM | DIASTOLIC BLOOD PRESSURE: 80 MMHG | OXYGEN SATURATION: 96 % | TEMPERATURE: 98 F | SYSTOLIC BLOOD PRESSURE: 131 MMHG | HEIGHT: 65 IN | BODY MASS INDEX: 34.16 KG/M2 | WEIGHT: 205 LBS | RESPIRATION RATE: 20 BRPM

## 2018-01-29 DIAGNOSIS — J20.9 ACUTE BRONCHITIS, UNSPECIFIED ORGANISM: Primary | ICD-10-CM

## 2018-01-29 DIAGNOSIS — J01.00 ACUTE NON-RECURRENT MAXILLARY SINUSITIS: ICD-10-CM

## 2018-01-29 DIAGNOSIS — E66.9 OBESITY (BMI 30.0-34.9): ICD-10-CM

## 2018-01-29 DIAGNOSIS — J20.9 ACUTE BRONCHITIS, UNSPECIFIED ORGANISM: ICD-10-CM

## 2018-01-29 PROCEDURE — 71046 X-RAY EXAM CHEST 2 VIEWS: CPT | Mod: TC,FY,PO

## 2018-01-29 PROCEDURE — 99214 OFFICE O/P EST MOD 30 MIN: CPT | Mod: S$GLB,,, | Performed by: PHYSICIAN ASSISTANT

## 2018-01-29 PROCEDURE — 71046 X-RAY EXAM CHEST 2 VIEWS: CPT | Mod: 26,,, | Performed by: RADIOLOGY

## 2018-01-29 PROCEDURE — 99999 PR PBB SHADOW E&M-EST. PATIENT-LVL IV: CPT | Mod: PBBFAC,,, | Performed by: PHYSICIAN ASSISTANT

## 2018-01-29 RX ORDER — PREDNISONE 20 MG/1
TABLET ORAL
Qty: 11 TABLET | Refills: 0 | Status: SHIPPED | OUTPATIENT
Start: 2018-01-29 | End: 2018-02-05

## 2018-01-29 RX ORDER — DOXYCYCLINE 100 MG/1
100 CAPSULE ORAL 2 TIMES DAILY
Qty: 20 CAPSULE | Refills: 0 | Status: SHIPPED | OUTPATIENT
Start: 2018-01-29 | End: 2018-02-08

## 2018-01-29 NOTE — PROGRESS NOTES
Subjective:       Patient ID: Marsha Choi is a 52 y.o. female.    Chief Complaint: vomiting, cough, diarrhea    Cough   This is a new problem. The current episode started 1 to 4 weeks ago. The problem has been unchanged. The problem occurs every few minutes. The cough is non-productive. Associated symptoms include ear pain, a fever, headaches, nasal congestion, postnasal drip, rhinorrhea, shortness of breath and wheezing. Pertinent negatives include no chest pain, chills, myalgias, sore throat or sweats. Associated symptoms comments: Maxillary sinus pressure, nausea, diarrhea x 1. The symptoms are aggravated by lying down. Risk factors for lung disease include smoking/tobacco exposure. She has tried steroid inhaler, prescription cough suppressant and oral steroids (mucinex, antibiotics) for the symptoms. The treatment provided no relief. Her past medical history is significant for bronchitis. There is no history of asthma, COPD, emphysema, environmental allergies or pneumonia.     Review of Systems   Constitutional: Positive for fever. Negative for chills and fatigue.   HENT: Positive for congestion, ear pain, postnasal drip, rhinorrhea, sinus pain, sinus pressure and sneezing. Negative for ear discharge, nosebleeds, sore throat, trouble swallowing and voice change.    Eyes: Negative.    Respiratory: Positive for cough, shortness of breath and wheezing. Negative for chest tightness.    Cardiovascular: Negative for chest pain, palpitations and leg swelling.   Gastrointestinal: Negative for abdominal pain, constipation, diarrhea, nausea and vomiting.   Musculoskeletal: Negative for arthralgias and myalgias.   Allergic/Immunologic: Negative for environmental allergies and immunocompromised state.   Neurological: Positive for headaches. Negative for dizziness, syncope and light-headedness.   Hematological: Negative for adenopathy.       Objective:      Vitals:    01/29/18 1529   BP: 131/80   Pulse: 75   Resp: 20    Temp: 98.2 °F (36.8 °C)     Physical Exam   Constitutional: She is oriented to person, place, and time. She appears well-developed.   Obese body habitus.   HENT:   Head: Normocephalic and atraumatic.   Right Ear: Hearing, tympanic membrane, external ear and ear canal normal.   Left Ear: Hearing, tympanic membrane, external ear and ear canal normal.   Nose: Mucosal edema and rhinorrhea present. Right sinus exhibits maxillary sinus tenderness. Right sinus exhibits no frontal sinus tenderness. Left sinus exhibits maxillary sinus tenderness. Left sinus exhibits no frontal sinus tenderness.   Mouth/Throat: Oropharynx is clear and moist and mucous membranes are normal.   Eyes: Conjunctivae, EOM and lids are normal. Pupils are equal, round, and reactive to light.   Cardiovascular: Normal rate, regular rhythm, normal heart sounds and intact distal pulses.    Pulmonary/Chest: Effort normal. She has decreased breath sounds. She has no wheezes. She has no rhonchi. She has no rales.   Lymphadenopathy:     She has no cervical adenopathy.   Neurological: She is alert and oriented to person, place, and time.   Skin: Skin is warm.       Assessment:       1. Acute bronchitis, unspecified organism    2. Acute non-recurrent maxillary sinusitis    3. Obesity (BMI 30.0-34.9)        Plan:       Acute bronchitis, unspecified organism  -     X-Ray Chest PA And Lateral; Future; Expected date: 01/29/2018. CXR is negative. D/c augmentin.  -     doxycycline (VIBRAMYCIN) 100 MG Cap; Take 1 capsule (100 mg total) by mouth 2 (two) times daily.  Dispense: 20 capsule; Refill: 0  -     predniSONE (DELTASONE) 20 MG tablet; 2 tabs PO daily for 3 days then 1 tab PO daily for 3 days then 1/2 tab PO daily for 3 days  Dispense: 11 tablet; Refill: 0  - Continue flovent inhaler as prescribed  - Work excuse provided through 1/31/18    Acute non-recurrent maxillary sinusitis  -     doxycycline (VIBRAMYCIN) 100 MG Cap; Take 1 capsule (100 mg total) by mouth  2 (two) times daily.  Dispense: 20 capsule; Refill: 0  -     predniSONE (DELTASONE) 20 MG tablet; 2 tabs PO daily for 3 days then 1 tab PO daily for 3 days then 1/2 tab PO daily for 3 days  Dispense: 11 tablet; Refill: 0        - Take antibiotics with food.  Increase fluid intake.  Call the clinic if symptoms worsen, new symptoms develop or if you are not any better after completion of your antibiotics.      Obesity (BMI 30.0-34.9)        -  see below    Patient readiness: acceptance and barriers:none    During the course of the visit the patient was educated and counseled about the following:     Obesity:   not addressed today    Goals: Obesity: Reduce calorie intake and BMI    Did patient meet goals/outcomes: No    The following self management tools provided: declined    Patient Instructions (the written plan) was given to the patient/family.     Time spent with patient: 15 minutes

## 2018-01-29 NOTE — LETTER
[facility]  [address]  [place] [address]  Phone: [phone]  Fax: [phone]   January 29, 2018     Patient: Marsha Choi   YOB: 1965   Date of Visit: 1/29/2018       To Whom it May Concern:    Marsha Choi was seen in my clinic on 1/29/2018. Please excuse her from work 1/25/18-1/31/18.     If you have any questions or concerns, please don't hesitate to call.    Sincerely,         Kimberli Siddiqui PA-C

## 2018-02-05 ENCOUNTER — OFFICE VISIT (OUTPATIENT)
Dept: FAMILY MEDICINE | Facility: CLINIC | Age: 53
End: 2018-02-05
Payer: COMMERCIAL

## 2018-02-05 ENCOUNTER — DOCUMENTATION ONLY (OUTPATIENT)
Dept: FAMILY MEDICINE | Facility: CLINIC | Age: 53
End: 2018-02-05

## 2018-02-05 VITALS
DIASTOLIC BLOOD PRESSURE: 73 MMHG | HEART RATE: 83 BPM | BODY MASS INDEX: 33.98 KG/M2 | TEMPERATURE: 98 F | OXYGEN SATURATION: 98 % | HEIGHT: 65 IN | WEIGHT: 203.94 LBS | RESPIRATION RATE: 20 BRPM | SYSTOLIC BLOOD PRESSURE: 124 MMHG

## 2018-02-05 DIAGNOSIS — E66.9 OBESITY (BMI 30.0-34.9): ICD-10-CM

## 2018-02-05 DIAGNOSIS — J40 BRONCHITIS: Primary | ICD-10-CM

## 2018-02-05 PROCEDURE — 3008F BODY MASS INDEX DOCD: CPT | Mod: S$GLB,,, | Performed by: PHYSICIAN ASSISTANT

## 2018-02-05 PROCEDURE — 99213 OFFICE O/P EST LOW 20 MIN: CPT | Mod: S$GLB,,, | Performed by: PHYSICIAN ASSISTANT

## 2018-02-05 PROCEDURE — 99999 PR PBB SHADOW E&M-EST. PATIENT-LVL IV: CPT | Mod: PBBFAC,,, | Performed by: PHYSICIAN ASSISTANT

## 2018-02-05 NOTE — PROGRESS NOTES
Pre-Visit Chart Review  For Appointment Scheduled on 02/05/18    Health Maintenance Due   Topic Date Due    Hepatitis C Screening  1965    Pneumococcal PPSV23 (Medium Risk) (1) 10/22/1983    Pap Smear with HPV Cotest  10/22/1986    Mammogram  10/22/2005    Colonoscopy  10/22/2015

## 2018-02-05 NOTE — PROGRESS NOTES
Subjective:       Patient ID: Marsha Choi is a 52 y.o. female.    Chief Complaint: Follow-up; Cough; and Chest Congestion    Cough   The current episode started 1 to 4 weeks ago. The problem has been gradually improving. The cough is non-productive. Associated symptoms include postnasal drip and rhinorrhea. Pertinent negatives include no chest pain, ear congestion, ear pain, fever, headaches, sore throat, shortness of breath or wheezing. Exacerbated by: smoking. Risk factors for lung disease include smoking/tobacco exposure. She has tried oral steroids and steroid inhaler for the symptoms. The treatment provided moderate relief.     Review of Systems   Constitutional: Negative for fever.   HENT: Positive for congestion, postnasal drip and rhinorrhea. Negative for ear pain, sinus pain, sore throat and trouble swallowing.    Respiratory: Positive for cough. Negative for shortness of breath, wheezing and stridor.    Cardiovascular: Negative for chest pain and palpitations.   Neurological: Negative for headaches.       Objective:      Physical Exam   Constitutional: Vital signs are normal. She appears well-developed and well-nourished. No distress.   HENT:   Head: Normocephalic and atraumatic.   Right Ear: Hearing, tympanic membrane, external ear and ear canal normal.   Left Ear: Hearing, tympanic membrane, external ear and ear canal normal.   Nose: Nose normal.   Mouth/Throat: Uvula is midline and mucous membranes are normal. Posterior oropharyngeal erythema present.   Cardiovascular: Normal rate, regular rhythm, S1 normal, S2 normal and normal heart sounds.  Exam reveals no gallop.    No murmur heard.  Pulses:       Radial pulses are 2+ on the right side, and 2+ on the left side.   <2sec cap refill fingers bilat     Pulmonary/Chest: Effort normal and breath sounds normal. No apnea. No respiratory distress. She has no decreased breath sounds. She has no wheezes. She has no rhonchi.   Skin: Skin is warm and dry.  She is not diaphoretic.   Appropriate skin turgor   Psychiatric: She has a normal mood and affect. Her speech is normal and behavior is normal. Judgment and thought content normal. Cognition and memory are normal.       Assessment:       1. Bronchitis    2. Obesity (BMI 30.0-34.9)        Plan:       Marsha was seen today for follow-up, cough and chest congestion.    Diagnoses and all orders for this visit:    Bronchitis  Resolving s/p prednisone and doxycycline.  Informed patient she needs to stop smoking.   Completed antibiotics as directed  Continue flovent inhaler bid    Obesity (BMI 30.0-34.9)  Patient readiness: acceptance and barriers:none    During the course of the visit the patient was educated and counseled about the following:     Obesity:   Regular aerobic exercise program discussed.    Goals: Obesity: Reduce calorie intake and BMI    Did patient meet goals/outcomes: No    The following self management tools provided: declined    Patient Instructions (the written plan) was given to the patient/family.     Time spent with patient: 15 minutes    Barriers to medications present (no )    Adverse reactions to current medications (no)    Over the counter medications reviewed (Yes)

## 2018-02-28 ENCOUNTER — TELEPHONE (OUTPATIENT)
Dept: NEUROLOGY | Facility: CLINIC | Age: 53
End: 2018-02-28

## 2018-02-28 NOTE — TELEPHONE ENCOUNTER
Called pt in regards to appointment on March 14,2018. Per provider request, asked pt if she can come in for 2:20 instead of 2:40. Left voicemail.

## 2018-03-14 DIAGNOSIS — G43.119 INTRACTABLE MIGRAINE WITH AURA WITHOUT STATUS MIGRAINOSUS: Primary | ICD-10-CM

## 2018-03-14 RX ORDER — SUMATRIPTAN 50 MG/1
TABLET, FILM COATED ORAL
Qty: 9 TABLET | Refills: 3 | Status: SHIPPED | OUTPATIENT
Start: 2018-03-14 | End: 2018-03-19 | Stop reason: SDUPTHER

## 2018-03-14 NOTE — TELEPHONE ENCOUNTER
----- Message from Roseline Horton sent at 3/14/2018  2:25 PM CDT -----  Contact: patient   Patient called to say she is running 5 minutes behind for her scheduled appointment 3/14/18 at 2:40pm. Call to pod. No answer.   Thanks!

## 2018-03-19 DIAGNOSIS — G43.119 INTRACTABLE MIGRAINE WITH AURA WITHOUT STATUS MIGRAINOSUS: ICD-10-CM

## 2018-03-19 RX ORDER — SUMATRIPTAN 50 MG/1
TABLET, FILM COATED ORAL
Qty: 9 TABLET | Refills: 3 | Status: SHIPPED | OUTPATIENT
Start: 2018-03-19 | End: 2018-12-31 | Stop reason: SDUPTHER

## 2018-03-19 RX ORDER — SUMATRIPTAN 50 MG/1
TABLET, FILM COATED ORAL
Qty: 9 TABLET | Refills: 3 | OUTPATIENT
Start: 2018-03-19 | End: 2019-05-02 | Stop reason: SDUPTHER

## 2018-04-10 ENCOUNTER — OFFICE VISIT (OUTPATIENT)
Dept: ENDOCRINOLOGY | Facility: CLINIC | Age: 53
End: 2018-04-10
Payer: COMMERCIAL

## 2018-04-10 ENCOUNTER — LAB VISIT (OUTPATIENT)
Dept: LAB | Facility: HOSPITAL | Age: 53
End: 2018-04-10
Attending: INTERNAL MEDICINE
Payer: COMMERCIAL

## 2018-04-10 VITALS
DIASTOLIC BLOOD PRESSURE: 64 MMHG | BODY MASS INDEX: 34.71 KG/M2 | HEART RATE: 96 BPM | WEIGHT: 208.31 LBS | SYSTOLIC BLOOD PRESSURE: 118 MMHG | HEIGHT: 65 IN

## 2018-04-10 DIAGNOSIS — Z78.0 POSTMENOPAUSAL: ICD-10-CM

## 2018-04-10 DIAGNOSIS — E04.2 MULTIPLE THYROID NODULES: ICD-10-CM

## 2018-04-10 DIAGNOSIS — E88.810 DYSMETABOLIC SYNDROME: ICD-10-CM

## 2018-04-10 DIAGNOSIS — E66.9 OBESITY (BMI 30.0-34.9): ICD-10-CM

## 2018-04-10 DIAGNOSIS — L73.2 HIDRADENITIS AXILLARIS: Primary | ICD-10-CM

## 2018-04-10 DIAGNOSIS — G89.29 CHRONIC NONINTRACTABLE HEADACHE, UNSPECIFIED HEADACHE TYPE: ICD-10-CM

## 2018-04-10 DIAGNOSIS — R51.9 CHRONIC NONINTRACTABLE HEADACHE, UNSPECIFIED HEADACHE TYPE: ICD-10-CM

## 2018-04-10 DIAGNOSIS — E06.9 THYROIDITIS: ICD-10-CM

## 2018-04-10 DIAGNOSIS — G47.33 OSA (OBSTRUCTIVE SLEEP APNEA): ICD-10-CM

## 2018-04-10 LAB
25(OH)D3+25(OH)D2 SERPL-MCNC: 13 NG/ML
ALBUMIN SERPL BCP-MCNC: 3.8 G/DL
ALP SERPL-CCNC: 63 U/L
ALT SERPL W/O P-5'-P-CCNC: 15 U/L
ANION GAP SERPL CALC-SCNC: 7 MMOL/L
AST SERPL-CCNC: 13 U/L
BASOPHILS # BLD AUTO: 0.07 K/UL
BASOPHILS NFR BLD: 0.6 %
BILIRUB SERPL-MCNC: 0.5 MG/DL
BUN SERPL-MCNC: 8 MG/DL
CALCIUM SERPL-MCNC: 10.1 MG/DL
CHLORIDE SERPL-SCNC: 103 MMOL/L
CHOLEST SERPL-MCNC: 219 MG/DL
CHOLEST/HDLC SERPL: 5.2 {RATIO}
CO2 SERPL-SCNC: 24 MMOL/L
CREAT SERPL-MCNC: 0.7 MG/DL
DIFFERENTIAL METHOD: ABNORMAL
EOSINOPHIL # BLD AUTO: 0.2 K/UL
EOSINOPHIL NFR BLD: 1.6 %
ERYTHROCYTE [DISTWIDTH] IN BLOOD BY AUTOMATED COUNT: 13.6 %
EST. GFR  (AFRICAN AMERICAN): >60 ML/MIN/1.73 M^2
EST. GFR  (NON AFRICAN AMERICAN): >60 ML/MIN/1.73 M^2
ESTIMATED AVG GLUCOSE: 100 MG/DL
FSH SERPL-ACNC: 8.9 MIU/ML
GLUCOSE SERPL-MCNC: 116 MG/DL
HBA1C MFR BLD HPLC: 5.1 %
HCT VFR BLD AUTO: 44.9 %
HDLC SERPL-MCNC: 42 MG/DL
HDLC SERPL: 19.2 %
HGB BLD-MCNC: 15 G/DL
IMM GRANULOCYTES # BLD AUTO: 0.07 K/UL
IMM GRANULOCYTES NFR BLD AUTO: 0.6 %
LDLC SERPL CALC-MCNC: 144.2 MG/DL
LH SERPL-ACNC: 4.2 MIU/ML
LYMPHOCYTES # BLD AUTO: 4 K/UL
LYMPHOCYTES NFR BLD: 35 %
MCH RBC QN AUTO: 30.7 PG
MCHC RBC AUTO-ENTMCNC: 33.4 G/DL
MCV RBC AUTO: 92 FL
MONOCYTES # BLD AUTO: 0.7 K/UL
MONOCYTES NFR BLD: 5.8 %
NEUTROPHILS # BLD AUTO: 6.5 K/UL
NEUTROPHILS NFR BLD: 56.4 %
NONHDLC SERPL-MCNC: 177 MG/DL
NRBC BLD-RTO: 0 /100 WBC
PLATELET # BLD AUTO: 384 K/UL
PMV BLD AUTO: 10.8 FL
POTASSIUM SERPL-SCNC: 3.5 MMOL/L
PROT SERPL-MCNC: 7.1 G/DL
RBC # BLD AUTO: 4.88 M/UL
SODIUM SERPL-SCNC: 134 MMOL/L
T3 SERPL-MCNC: 92 NG/DL
T4 FREE SERPL-MCNC: 1 NG/DL
TRIGL SERPL-MCNC: 164 MG/DL
TSH SERPL DL<=0.005 MIU/L-ACNC: 0.81 UIU/ML
URATE SERPL-MCNC: 4.9 MG/DL
WBC # BLD AUTO: 11.48 K/UL

## 2018-04-10 PROCEDURE — 84550 ASSAY OF BLOOD/URIC ACID: CPT

## 2018-04-10 PROCEDURE — 84432 ASSAY OF THYROGLOBULIN: CPT

## 2018-04-10 PROCEDURE — 83002 ASSAY OF GONADOTROPIN (LH): CPT

## 2018-04-10 PROCEDURE — 82306 VITAMIN D 25 HYDROXY: CPT

## 2018-04-10 PROCEDURE — 84439 ASSAY OF FREE THYROXINE: CPT

## 2018-04-10 PROCEDURE — 85025 COMPLETE CBC W/AUTO DIFF WBC: CPT

## 2018-04-10 PROCEDURE — 99214 OFFICE O/P EST MOD 30 MIN: CPT | Mod: S$GLB,,, | Performed by: INTERNAL MEDICINE

## 2018-04-10 PROCEDURE — 99999 PR PBB SHADOW E&M-EST. PATIENT-LVL IV: CPT | Mod: PBBFAC,,, | Performed by: INTERNAL MEDICINE

## 2018-04-10 PROCEDURE — 86316 IMMUNOASSAY TUMOR OTHER: CPT

## 2018-04-10 PROCEDURE — 80053 COMPREHEN METABOLIC PANEL: CPT

## 2018-04-10 PROCEDURE — 83018 HEAVY METAL QUAN EACH NES: CPT

## 2018-04-10 PROCEDURE — 36415 COLL VENOUS BLD VENIPUNCTURE: CPT | Mod: PO

## 2018-04-10 PROCEDURE — 84443 ASSAY THYROID STIM HORMONE: CPT

## 2018-04-10 PROCEDURE — 83036 HEMOGLOBIN GLYCOSYLATED A1C: CPT

## 2018-04-10 PROCEDURE — 83001 ASSAY OF GONADOTROPIN (FSH): CPT

## 2018-04-10 PROCEDURE — 80061 LIPID PANEL: CPT

## 2018-04-10 PROCEDURE — 84480 ASSAY TRIIODOTHYRONINE (T3): CPT

## 2018-04-10 PROCEDURE — 82308 ASSAY OF CALCITONIN: CPT

## 2018-04-10 RX ORDER — PHENTERMINE HYDROCHLORIDE 15 MG/1
15 CAPSULE ORAL EVERY MORNING
Qty: 60 CAPSULE | Refills: 2 | Status: SHIPPED | OUTPATIENT
Start: 2018-04-10 | End: 2018-06-09

## 2018-04-10 NOTE — PROGRESS NOTES
Subjective:      Patient ID: Marsha Choi is a 52 y.o. female.    Chief Complaint:  Follow-up (f/u from last appt)      History of Present Illness    Ms. Marsha Choi is a female here for multiple thyroid nodules. Physician wanted to do radio uptake and scan ~18 years ago because of prominent swelling. The scan showed one side of her thyroid had uptake. TFTs have been normal ever since then.      Just moved to [place] in January. 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     States choking when drinking cold liquids, drinks coffee often, no swelling (states she did have swelling in May), deeper and raspier voice. Never used lithium or amiodarone.  No mood changes or changes in heart rate.     Constipation, gained 20 lbs this year     [place] baseline score of 13.  She does not have trouble sleeping. She attributes her daytime sleepiness to having long days. Reports snoring, previous polysomnography showed severe sleep apnea but she says this ways from her allergies. States her  says she snores.    Patient had endometrial ablation ~ 4 yrs ago.     Review of Systems   Constitutional: Positive for fatigue (mild and intermittent) and unexpected weight change (progressive weight gain). Negative for fever.   HENT: Negative for facial swelling and trouble swallowing.    Eyes: Negative for visual disturbance.   Respiratory: Negative for cough and shortness of breath.    Cardiovascular: Negative for chest pain, palpitations and leg swelling.   Gastrointestinal: Positive for abdominal pain.   Genitourinary: Negative for menstrual problem (S/p uterine ablation).   Musculoskeletal: Negative for arthralgias and gait problem.   Skin: Negative for color change, pallor and rash.   Neurological: Positive for headaches (chronic migraines; stable on lamictal). Negative for tremors.   Hematological: Does not bruise/bleed easily.   Psychiatric/Behavioral: Negative for decreased concentration and sleep  "disturbance.       Objective: /64 (BP Location: Left arm, Patient Position: Sitting, BP Method: Medium (Manual))   Pulse 96   Ht 5' 5" (1.651 m)   Wt 94.5 kg (208 lb 5.4 oz)   BMI 34.67 kg/m²        Physical Exam   Constitutional: She is oriented to person, place, and time. She appears well-developed and well-nourished. No distress.   Pleasant middle aged lady. Not pale, anicteric and afebrile.  Well hydrated. Not in any acute distress.   HENT:   Head: Normocephalic and atraumatic.   Mouth/Throat: No oropharyngeal exudate.   Eyes: EOM are normal. Pupils are equal, round, and reactive to light. No scleral icterus.   Neck: Normal range of motion. Neck supple. No JVD present. No thyromegaly present.   Cardiovascular: Normal rate, regular rhythm and normal heart sounds.    Pulmonary/Chest: Effort normal and breath sounds normal. No respiratory distress. She has no wheezes.   Abdominal: Soft. She exhibits no distension.   Obese anterior abdominal wall   Musculoskeletal: Normal range of motion. She exhibits no edema.   Neurological: She is alert and oriented to person, place, and time. No cranial nerve deficit.   Skin: Skin is warm and dry. No rash noted. She is not diaphoretic. No erythema. No pallor.   Psychiatric: She has a normal mood and affect. Her behavior is normal. Judgment and thought content normal.   Vitals reviewed.      Lab Review:     Results for DIAZ BROWN (MRN [id number]) as of 4/10/2018 11:59   Ref. Range 10/20/2017 14:54 1/22/2018 16:21   Hemoglobin A1C Latest Ref Range: 4.0 - 5.6 % 5.1    Estimated Avg Glucose Latest Ref Range: 68 - 131 mg/dL 100    TSH Latest Ref Range: 0.400 - 4.000 uIU/mL 0.893    T3, Total Latest Ref Range: 60 - 180 ng/dL 81    Free T4 Latest Ref Range: 0.71 - 1.51 ng/dL 1.02    Thyroglobulin Interpretation Unknown SEE BELOW    Thyroglobulin Antibody Screen Latest Ref Range: <4.0 IU/mL <1.8    Thyroglobulin, Tumor Marker Latest Units: ng/mL 12 (H)    FSH Latest Ref " Range: See Text mIU/mL 28.20    LH Latest Ref Range: See Text mIU/mL 10.5    Rapid Strep A Screen Latest Ref Range: Negative   Negative    Acceptable Unknown  Yes   POCT RAPID STREP A Unknown  Rpt     Results for DIAZ BROWN (MRN [id number]) as of 4/10/2018 11:59   Ref. Range 7/13/2017 12:53   Sodium Latest Ref Range: 136 - 145 mmol/L 139   Potassium Latest Ref Range: 3.5 - 5.1 mmol/L 4.2   Chloride Latest Ref Range: 95 - 110 mmol/L 105   CO2 Latest Ref Range: 23 - 29 mmol/L 26   Anion Gap Latest Ref Range: 8 - 16 mmol/L 8   BUN, Bld Latest Ref Range: 6 - 20 mg/dL 11   Creatinine Latest Ref Range: 0.5 - 1.4 mg/dL 0.7   eGFR if non African American Latest Ref Range: >60 mL/min/1.73 m^2 >60.0   eGFR if African American Latest Ref Range: >60 mL/min/1.73 m^2 >60.0   Glucose Latest Ref Range: 70 - 110 mg/dL 94   Calcium Latest Ref Range: 8.7 - 10.5 mg/dL 9.6       Assessment:     1. Hidradenitis axillaris     2. Obesity (BMI 30.0-34.9)  Vitamin D    Ambulatory consult to Nutrition Services    phentermine 15 MG capsule   3. Multiple thyroid nodules  US Soft Tissue Head Neck Thyroid    Calcitonin    Chromogranin A    Iodine, Serum    T4, free    T3    Thyroglobulin    TSH    CBC auto differential   4. ALMA (obstructive sleep apnea)     5. Chronic nonintractable headache, unspecified headache type     6. Dysmetabolic syndrome  Comprehensive metabolic panel    CBC auto differential    Uric acid    Vitamin D    Hemoglobin A1c    Lipid panel    Microalbumin/creatinine urine ratio    Urinalysis   7. Thyroiditis  CBC auto differential   8. Postmenopausal  DXA Bone Density Spine And Hip    Luteinizing hormone    Follicle stimulating hormone        Multiple Thyroid nodules  Chronic-monitor  TFTs WNL for ffup thyroid USS ~ 05/18     Hidradenitis axillaris  Chronic-worsening  Declined Ambulatory referral to Derm  Advised to use desiccants  Advised to wear cotton undergarments.     Dyshagia  Esophagram  PFT  May  refer to GI     Postmenopausal  Chronic-stable-monitor. To obtain FSH/LH to confirm status    Regarding obesity; discussed with patient dietary intervention strategies including meal replacements vs structured meal plans.  Patient unable to tolerate topiramate but had had prior salutary effect with phentermine. Will restart same; 15mg Qd.       Plan:     FFup in ~ 3mths.

## 2018-04-11 DIAGNOSIS — E55.9 HYPOVITAMINOSIS D: Primary | ICD-10-CM

## 2018-04-11 RX ORDER — ERGOCALCIFEROL 1.25 MG/1
50000 CAPSULE ORAL
Qty: 12 CAPSULE | Refills: 3 | Status: SHIPPED | OUTPATIENT
Start: 2018-04-11 | End: 2018-07-10

## 2018-04-12 LAB
CALCIT SERPL-MCNC: <5 PG/ML
IODINE SERPL-MCNC: 60 NG/ML (ref 40–92)
THRYOGLOBULIN INTERPRETATION: ABNORMAL
THYROGLOB AB SERPL-ACNC: <1.8 IU/ML
THYROGLOB SERPL-MCNC: 17 NG/ML

## 2018-04-13 ENCOUNTER — HOSPITAL ENCOUNTER (OUTPATIENT)
Dept: RADIOLOGY | Facility: CLINIC | Age: 53
Discharge: HOME | End: 2018-04-13
Attending: INTERNAL MEDICINE
Payer: COMMERCIAL

## 2018-04-13 DIAGNOSIS — E04.2 MULTIPLE THYROID NODULES: ICD-10-CM

## 2018-04-13 DIAGNOSIS — Z78.0 POSTMENOPAUSAL: ICD-10-CM

## 2018-04-13 PROCEDURE — 77080 DXA BONE DENSITY AXIAL: CPT | Mod: 26,,, | Performed by: RADIOLOGY

## 2018-04-13 PROCEDURE — 76536 US EXAM OF HEAD AND NECK: CPT | Mod: TC,PO

## 2018-04-13 PROCEDURE — 76536 US EXAM OF HEAD AND NECK: CPT | Mod: 26,,, | Performed by: RADIOLOGY

## 2018-04-13 PROCEDURE — 77080 DXA BONE DENSITY AXIAL: CPT | Mod: TC,PO

## 2018-04-14 LAB — CGA SERPL-MCNC: 57 NG/ML (ref 0–95)

## 2018-04-17 ENCOUNTER — OFFICE VISIT (OUTPATIENT)
Dept: NEUROLOGY | Facility: CLINIC | Age: 53
End: 2018-04-17
Payer: COMMERCIAL

## 2018-04-17 VITALS
DIASTOLIC BLOOD PRESSURE: 97 MMHG | WEIGHT: 204.25 LBS | HEIGHT: 65 IN | HEART RATE: 90 BPM | RESPIRATION RATE: 16 BRPM | SYSTOLIC BLOOD PRESSURE: 135 MMHG | BODY MASS INDEX: 34.03 KG/M2

## 2018-04-17 DIAGNOSIS — G47.33 OSA (OBSTRUCTIVE SLEEP APNEA): ICD-10-CM

## 2018-04-17 DIAGNOSIS — G43.119 INTRACTABLE MIGRAINE WITH AURA WITHOUT STATUS MIGRAINOSUS: Primary | ICD-10-CM

## 2018-04-17 PROCEDURE — 99214 OFFICE O/P EST MOD 30 MIN: CPT | Mod: S$GLB,,, | Performed by: PSYCHIATRY & NEUROLOGY

## 2018-04-17 PROCEDURE — 99999 PR PBB SHADOW E&M-EST. PATIENT-LVL III: CPT | Mod: PBBFAC,,, | Performed by: PSYCHIATRY & NEUROLOGY

## 2018-04-17 RX ORDER — LAMOTRIGINE 25 MG/1
TABLET ORAL
Qty: 180 TABLET | Refills: 11 | Status: SHIPPED | OUTPATIENT
Start: 2018-04-17 | End: 2019-05-02 | Stop reason: ALTCHOICE

## 2018-04-17 NOTE — PROGRESS NOTES
Subjective:       Patient ID: Marsha Khalil is a 51 y.o. female.    Chief Complaint: Headaches   INTERVAL HISTORY  The patient presents for follow up regarding migraine headache with and without aura. She is doing very well since placed on prevention with Lamictal, now taking 25 mg BID. She is also responding better to Imitrex and has not had to use Fioricet. She describes stress as her main trigger.       HPI  The patient is a pleasant 50 y/o female presenting with chief complaint of headache. They started after the birth of her last child 18 years ago. The location varies and migrates from one side to another, posterior or anterior or pancephalic. She has never taken preventive medication or migraine specific medications. She treats with tylenol or NSAIDs with partial relief. The duration varied from 3 to 4 hours to 3 to 4 days. She is more worried about episodes consisting of scintillating scotoma, described as a typical aura followed by a severe headache. She has had an ECHO in the past but not with bubble contrast. She has had a recent MRI of the brain which was negative, revealing microischemic changes as expected for her age. Her father suffered from MS. She has undergone extensive work up for MS with negative results.  Please see details of headache characteristics below.  Headache questionnaire     1. When did your Headaches start?                         1999 with the birth of last child        2. Where are your headaches located?                        Both sides around the back of neck         3. Your headache's characteristics:                        Excruciating, Pressure, Throbbing, Pounding, Sharp        4. How long does the headache last?                        days        5. How often does the headache occur?                        0 varies         6. Are your headaches preceded or accompanied by other symptoms? yes                        If yes, please describe.  Blurry vision kaleidoscope  affect , peripheral vision disappears         7. Does the headache awaken you at night? yes                        If so, how often? Once or twice per month            8. Please bev the word that best describes your headache's intensity:                         severe        9. Using a scale of 1 through 10, with 0 = no pain and 10 = the worst pain:                        What score is your headache now? 2                        What score is your headache at its worst? 10                        What score is your headache at its best? 2           10. Possible associated headache symptoms:  [x]  Sensitivity to light                                      [] Dizziness                                        [] Nasal or sinus pressure/ pain                        [x] Sensitivity to noise                                     [] Vertigo                                            [x] Problems with concentration  [] Sensitivity to smells             [] Ringing in ears                     [] Problems with memory                                            [x] Blurred vision                                 [x] Irritability                                         [x] Problems with task completion   [] Double vision                                 [] Anger                                  [x]  Problems with relaxation  [] Loss of appetite                                         [] Anxiety                                           [x] Neck tightness, Neck pain  [x] Nausea                                                                 [] Nasal congestion  [] Vomiting                                                                       11. Headache improving factors:  [] Sleep                                  [x] Heat  [x] Darkness                                        [x] Ice  [] Local pressure                     [] Menses (period)  [x] Massage                                        [x] Medications:  advil and  tylenol        12. Headache worsening factors:   [] Fatigue                     [] Sneezing                                        [x] Changes in Weather  [x] Light             [x] Bending Over           [x] Stress  [] Noise            [] Ovulation                                        [] Multiple Sclerosis Flare-Up  [] Smells                      [] Menses                                          [] Food   [x] Coughing                  [] Alcohol        13. Number of caffeinated drinks per day: 2        14. Number of diet drinks per day:  0     Review of Systems   Constitutional: Positive for fatigue. Negative for activity change, appetite change and fever.   HENT: Negative for congestion, dental problem, hearing loss, sinus pressure, tinnitus, trouble swallowing and voice change.    Eyes: Negative for photophobia, pain, redness and visual disturbance (visual aura).   Respiratory: Negative for cough, chest tightness and shortness of breath.    Cardiovascular: Negative for chest pain, palpitations and leg swelling.   Gastrointestinal: Positive for nausea. Negative for abdominal pain, blood in stool and vomiting.   Endocrine: Positive for cold intolerance. Negative for heat intolerance.   Genitourinary: Negative for difficulty urinating, frequency, menstrual problem and urgency.   Musculoskeletal: Negative for arthralgias, back pain, gait problem, joint swelling, myalgias, neck pain and neck stiffness.   Skin: Negative.    Neurological: Negative for dizziness, tremors, seizures, syncope, facial asymmetry, speech difficulty, weakness, light-headedness, numbness and headaches.   Hematological: Negative for adenopathy. Does not bruise/bleed easily.   Psychiatric/Behavioral: Negative for agitation, behavioral problems, confusion, decreased concentration, self-injury, sleep disturbance and suicidal ideas. The patient is not nervous/anxious and is not hyperactive.          Past Medical History:   Diagnosis Date     Allergy     Chest pain     negative stress test and heart cath    Headache     ALMA (obstructive sleep apnea)     Seasonal allergies      Past Surgical History:   Procedure Laterality Date    abscess removal from right side of neck      APPENDECTOMY      BREAST SURGERY Left     lumpectomy, benign    CARDIAC CATHETERIZATION      part of evaluation for chest pain, negative     SECTION      x1    ENDOMETRIAL ABLATION      TUBAL LIGATION       Family History   Problem Relation Age of Onset    Multiple sclerosis Father     Diabetes Father     Heart disease Father 50     CAD    Diabetes Mother     Cancer Mother      breast    Heart disease Mother 50     CAD    Thyroid disease Daughter      hypothyroidism    Transient ischemic attack Sister      Social History     Social History    Marital status: Significant Other     Spouse name: N/A    Number of children: N/A    Years of education: N/A     Occupational History    Not on file.     Social History Main Topics    Smoking status: Current Every Day Smoker     Packs/day: 1.00     Years: 30.00     Types: Cigarettes    Smokeless tobacco: Never Used    Alcohol use No    Drug use: No    Sexual activity: Yes     Partners: Male     Other Topics Concern    Not on file     Social History Narrative    No narrative on file     Review of patient's allergies indicates:  No Known Allergies    Current Outpatient Prescriptions:     butalbital-aspirin-caffeine -40 mg (FIORINAL) -40 mg Cap, Take 1 capsule by mouth every 6 (six) hours as needed., Disp: 12 capsule, Rfl: 2    cetirizine (ZYRTEC) 10 MG tablet, Take 10 mg by mouth once daily., Disp: , Rfl:     ibuprofen (ADVIL,MOTRIN) 200 MG tablet, Take 600 mg by mouth 2 (two) times daily as needed for Pain., Disp: , Rfl:     lamotrigine (LAMICTAL) 25 MG tablet, Take 1 tablet daily for 1 week, then take 1 po BID for 1 week, then take 2 tablets BID for initial goal of 50 mg BID, Disp: 120 tablet,  Rfl: 11    sumatriptan (IMITREX) 50 MG tablet, Take 1 at onset of headache, may repeat in 2 hours to a max of 3 per day, 2 days per week, Disp: 9 tablet, Rfl: 3      Objective:      Vitals:    04/17/18 1503   BP: (!) 135/97   Pulse: 90   Resp: 16     Body mass index is 33.99 kg/m².      Physical Exam    Constitutional:   She appears well-developed and well-nourished. She is well groomed    HENT:    Head: Atraumatic, oral and nasal mucosa intact  Eyes: Conjunctivae and EOM are normal. Pupils are equal, round, and reactive to light OU  Neck: Neck supple. No thyromegaly present  Cardiovascular: Normal rate and normal heart sounds  No murmur heard  Pulmonary/Chest: Effort normal and breath sounds normal  Musculoskeletal: Normal range of motion. No joint stiffness. No vertebral point tenderness  Skin: Skin is warm and dry  Psychiatric: Normal mood and affect     Neuro exam:    Mental status:  Awake, attentive, Alert, oriented to self, place, year and month  Language function is intact    Cranial Nerves:  Smell was not formally evaluated  Cranial Nerves II - XII: intact  Pursuits were smooth, normal saccades, no nystagmus OU  Funduscopic exam - disc were flat and pink, no exudates or hemorrhages OU  Motor - facial movement was symmetrical and normal     Palate moved well and was symmetrical with normal palatal and oral sensation  Tongue movements were full    Coordination:     Rapid alternating movements and rapid finger tapping - normal bilaterally  Finger to nose - normal and symmetric bilaterally   Heel to shin test - normal and symmetric bilaterally   Arm roll - smooth and symmetric   No intentional or positional tremor.     Motor:  Normal muscle bulk and symmetry. No fasciculations were noted    No pronator drift  Strength 5/5 bilaterally     Reflexes:  Tendon reflexes were 2 + at biceps, triceps, brachioradialis, patellar, and Achilles bilaterally  No clonus was noted     Sensory: Intact to light touch, pin prick  in all extremities.     Gait: Romberg absent. Normal gait. Normal arm swing and turns. Good tandem    Review of Data:  Lab Results   Component Value Date     07/13/2017    K 4.2 07/13/2017     07/13/2017    CO2 26 07/13/2017    BUN 11 07/13/2017    CREATININE 0.7 07/13/2017    GLU 94 07/13/2017    AST 14 07/04/2017    ALT 14 07/04/2017    ALBUMIN 3.9 07/04/2017    PROT 6.9 07/04/2017    BILITOT 0.5 07/04/2017    CHOL 192 05/10/2017    HDL 36 (L) 05/10/2017    LDLCALC 138.2 05/10/2017    TRIG 89 05/10/2017       Lab Results   Component Value Date    WBC 10.69 07/13/2017    HGB 15.0 07/13/2017    HCT 43.3 07/13/2017    MCV 90 07/13/2017     (H) 07/13/2017       Lab Results   Component Value Date    TSH 0.826 05/10/2017     Results for orders placed or performed during the hospital encounter of 05/12/17   MRA Brain    Narrative    Technique:Noncontrast 3-D time-of-flight MRA head with review of axial source images and maximum intensity projection reconstructions      Comparison:None available at this institution     Findings:The quality of the study is good. The visualized cervical, petrous, cavernous and supraclinoid internal carotid arteries are unremarkable. The bilateral anterior cerebral and anterior communicating arteries are unremarkable. The bilateral middle cerebral arteries are patent and symmetric without evidence for stenosis.    The vertebral arteries are codominant. No vertebral artery stenosis or dissection is seen. The visualized inferior and superior cerebellar arteries are unremarkable. The basilar artery is unremarkable. The bilateral posterior cerebral arteries are unremarkable. There is a small, patent right posterior communicating artery. The left posterior communicating artery appears to be small or absent.    No intracranial aneurysm is identified.    Impression    1. Normal MRA of the brain. Specifically, no identifiable intracranial aneurysm is seen. There is reportedly a  previous diagnosis of a 1 mm aneurysm which would be difficult to reliably diagnose by MRA.    Epic notification system activated.      Electronically signed by: Elizabeth Thayer MD  Date:     05/12/17  Time:    16:58    Results for orders placed or performed during the hospital encounter of 05/12/17   MRI Brain W WO Contrast    Narrative    Comparison:None available at this institution    Technique: Sagittal T1, axial T1, T2, flair, T2 gradient echo and diffusion and 3 plane postcontrast T1-weighted sequences of the brain. 9 ml Gadavist was administered intravenously for the contrast sequences.    Findings:No reduced diffusion is identified to suggest acute ischemia. The pituitary gland and corpus callosum are unremarkable. No focal abnormal magnetic susceptibility is identified to suggest abnormal parenchymal blood products. No abnormal T1 shortening is identified. No extra-axial fluid collection, mass effect or midline shift is seen. Normal ventricular size is noted. No acute posterior fossa abnormality is seen. No IAC abnormality is seen. Orbital soft tissues are unremarkable. The paranasal sinuses and mastoid air cells appear clear. Slight leftward nasal septal deviation noted. There are a few, small, foci of round T2 and flair hyperintensity in the subcortical white matter of the bilateral frontal and parietal lobes, to a degree which is common in this age group. No focal abnormal contrast enhancement is identified. T2 arterial and dural venous sinus flow voids appear maintained which will be better evaluated on dedicated MRA from the same day. No calvarial or clival abnormality is seen.    Impression    1. No acute abnormality is seen.  2. Mild, nonspecific bilateral subcortical chronic white matter disease to a degree which is common in this age group. Findings commonly reflects small vessel disease such as arteriolosclerosis.      Electronically signed by: Elizabeth Thayer MD  Date:     05/12/17  Time:    17:00                 Assessment and Plan   Migraine with aura manifesting with scintillating scotoma. Due to the high frequency of attacks, 3 out of 5 migraines, Will obtain a 2D ECHO with bubble contrast looking for a PFO or ASD which are commonly found in association with migraine with aura. For prevention, continue Lamictal to 25 mg BID. For the acute attack, sumatriptan as first line and limited Fiorinal for rescue.    Migraine without aura. Same plan as above  Seasonal allergies  ALMA  I have discussed the side effects of the medications prescribed and the patient acknowledges understanding    RTC in 6 months with headache diary        Florentin Fischer M.D  Medical Director, Headache and Facial Pain  [facility]

## 2018-04-23 ENCOUNTER — TELEPHONE (OUTPATIENT)
Dept: NEUROLOGY | Facility: CLINIC | Age: 53
End: 2018-04-23

## 2018-04-23 NOTE — TELEPHONE ENCOUNTER
Called pt in regards to follow up appointment. 6 month recall placed in system. Pt verbalized understanding.

## 2018-07-12 DIAGNOSIS — Z11.59 NEED FOR HEPATITIS C SCREENING TEST: ICD-10-CM

## 2018-07-12 DIAGNOSIS — Z12.39 BREAST CANCER SCREENING: ICD-10-CM

## 2018-07-18 ENCOUNTER — TELEPHONE (OUTPATIENT)
Dept: NEUROLOGY | Facility: CLINIC | Age: 53
End: 2018-07-18

## 2018-07-18 NOTE — TELEPHONE ENCOUNTER
Returned patients call in regards to increased migraines wants to change medications. Will forward message to provider.

## 2018-07-18 NOTE — TELEPHONE ENCOUNTER
----- Message from Shara Macias sent at 7/18/2018 12:41 PM CDT -----  Contact: self  Patient [phone] is calling to schedule an appjt with Dr Fischer and the Imitrex is not helping/she is requesting an appt as soon as possible /please call patient

## 2018-07-19 RX ORDER — BUTALBITAL, ACETAMINOPHEN AND CAFFEINE 50; 325; 40 MG/1; MG/1; MG/1
1 TABLET ORAL EVERY 6 HOURS PRN
Qty: 10 TABLET | Refills: 2 | Status: SHIPPED | OUTPATIENT
Start: 2018-07-19 | End: 2018-08-18

## 2018-07-19 RX ORDER — METHYLPREDNISOLONE 4 MG/1
TABLET ORAL
Qty: 1 PACKAGE | Refills: 0 | Status: SHIPPED | OUTPATIENT
Start: 2018-07-19 | End: 2018-08-09

## 2018-07-31 ENCOUNTER — OFFICE VISIT (OUTPATIENT)
Dept: NEUROLOGY | Facility: CLINIC | Age: 53
End: 2018-07-31
Payer: COMMERCIAL

## 2018-07-31 VITALS
RESPIRATION RATE: 16 BRPM | HEIGHT: 65 IN | HEART RATE: 80 BPM | BODY MASS INDEX: 33 KG/M2 | WEIGHT: 198.06 LBS | DIASTOLIC BLOOD PRESSURE: 90 MMHG | SYSTOLIC BLOOD PRESSURE: 140 MMHG

## 2018-07-31 DIAGNOSIS — E66.9 OBESITY (BMI 30.0-34.9): ICD-10-CM

## 2018-07-31 DIAGNOSIS — G47.33 OSA (OBSTRUCTIVE SLEEP APNEA): ICD-10-CM

## 2018-07-31 DIAGNOSIS — G43.119 INTRACTABLE MIGRAINE WITH AURA WITHOUT STATUS MIGRAINOSUS: Primary | ICD-10-CM

## 2018-07-31 PROCEDURE — 3008F BODY MASS INDEX DOCD: CPT | Mod: CPTII,S$GLB,, | Performed by: PSYCHIATRY & NEUROLOGY

## 2018-07-31 PROCEDURE — 99214 OFFICE O/P EST MOD 30 MIN: CPT | Mod: 25,S$GLB,, | Performed by: PSYCHIATRY & NEUROLOGY

## 2018-07-31 PROCEDURE — 99999 PR PBB SHADOW E&M-EST. PATIENT-LVL III: CPT | Mod: PBBFAC,,, | Performed by: PSYCHIATRY & NEUROLOGY

## 2018-07-31 PROCEDURE — 96372 THER/PROPH/DIAG INJ SC/IM: CPT | Mod: S$GLB,,, | Performed by: PSYCHIATRY & NEUROLOGY

## 2018-07-31 RX ORDER — PHENTERMINE HYDROCHLORIDE 15 MG/1
CAPSULE ORAL
Refills: 0 | COMMUNITY
Start: 2018-06-27 | End: 2019-07-18

## 2018-07-31 RX ORDER — KETOROLAC TROMETHAMINE 30 MG/ML
30 INJECTION, SOLUTION INTRAMUSCULAR; INTRAVENOUS ONCE
Status: COMPLETED | OUTPATIENT
Start: 2018-07-31 | End: 2018-07-31

## 2018-07-31 RX ADMIN — KETOROLAC TROMETHAMINE 30 MG: 30 INJECTION, SOLUTION INTRAMUSCULAR; INTRAVENOUS at 04:07

## 2018-07-31 NOTE — PROGRESS NOTES
Subjective:       Patient ID: Marsha Khalil is a 51 y.o. female.    Chief Complaint: Headaches   INTERVAL HISTORY  The patient presents for follow up regarding migraine headache with and without aura. She was doing very well since placed on prevention with Lamictal, but for the last 4 weeks, she has had a constant, severe headache, not responding to Imitrex. Stress is a big trigger. Her daughter was just diagnosed with a pineal cyst and a small acoustic schwanoma. Otherwise information below is still accurate and current.    HPI  The patient is a pleasant 52 y/o female presenting with chief complaint of headache. They started after the birth of her last child 18 years ago. The location varies and migrates from one side to another, posterior or anterior or pancephalic. She has never taken preventive medication or migraine specific medications. She treats with tylenol or NSAIDs with partial relief. The duration varied from 3 to 4 hours to 3 to 4 days. She is more worried about episodes consisting of scintillating scotoma, described as a typical aura followed by a severe headache. She has had an ECHO in the past but not with bubble contrast. She has had a recent MRI of the brain which was negative, revealing microischemic changes as expected for her age. Her father suffered from MS. She has undergone extensive work up for MS with negative results.  Please see details of headache characteristics below.  Headache questionnaire     1. When did your Headaches start?                         1999 with the birth of last child        2. Where are your headaches located?                        Both sides around the back of neck         3. Your headache's characteristics:                        Excruciating, Pressure, Throbbing, Pounding, Sharp        4. How long does the headache last?                        days        5. How often does the headache occur?                        0 varies         6. Are your headaches  preceded or accompanied by other symptoms? yes                        If yes, please describe.  Blurry vision kaleidoscope affect , peripheral vision disappears         7. Does the headache awaken you at night? yes                        If so, how often? Once or twice per month            8. Please bev the word that best describes your headache's intensity:                         severe        9. Using a scale of 1 through 10, with 0 = no pain and 10 = the worst pain:                        What score is your headache now? 2                        What score is your headache at its worst? 10                        What score is your headache at its best? 2           10. Possible associated headache symptoms:  [x]  Sensitivity to light                                      [] Dizziness                                        [] Nasal or sinus pressure/ pain                        [x] Sensitivity to noise                                     [] Vertigo                                            [x] Problems with concentration  [] Sensitivity to smells             [] Ringing in ears                     [] Problems with memory                                            [x] Blurred vision                                 [x] Irritability                                         [x] Problems with task completion   [] Double vision                                 [] Anger                                  [x]  Problems with relaxation  [] Loss of appetite                                         [] Anxiety                                           [x] Neck tightness, Neck pain  [x] Nausea                                                                 [] Nasal congestion  [] Vomiting                                                                       11. Headache improving factors:  [] Sleep                                  [x] Heat  [x] Darkness                                        [x] Ice  [] Local pressure                      [] Menses (period)  [x] Massage                                        [x] Medications:  advil and tylenol        12. Headache worsening factors:   [] Fatigue                     [] Sneezing                                        [x] Changes in Weather  [x] Light             [x] Bending Over           [x] Stress  [] Noise            [] Ovulation                                        [] Multiple Sclerosis Flare-Up  [] Smells                      [] Menses                                          [] Food   [x] Coughing                  [] Alcohol        13. Number of caffeinated drinks per day: 2        14. Number of diet drinks per day:  0     Review of Systems   Constitutional: Positive for fatigue. Negative for activity change, appetite change and fever.   HENT: Negative for congestion, dental problem, hearing loss, sinus pressure, tinnitus, trouble swallowing and voice change.    Eyes: Negative for photophobia, pain, redness and visual disturbance (visual aura).   Respiratory: Negative for cough, chest tightness and shortness of breath.    Cardiovascular: Negative for chest pain, palpitations and leg swelling.   Gastrointestinal: Positive for nausea. Negative for abdominal pain, blood in stool and vomiting.   Endocrine: Positive for cold intolerance. Negative for heat intolerance.   Genitourinary: Negative for difficulty urinating, frequency, menstrual problem and urgency.   Musculoskeletal: Negative for arthralgias, back pain, gait problem, joint swelling, myalgias, neck pain and neck stiffness.   Skin: Negative.    Neurological: Negative for dizziness, tremors, seizures, syncope, facial asymmetry, speech difficulty, weakness, light-headedness, numbness and headaches.   Hematological: Negative for adenopathy. Does not bruise/bleed easily.   Psychiatric/Behavioral: Negative for agitation, behavioral problems, confusion, decreased concentration, self-injury, sleep disturbance and suicidal ideas. The patient  is not nervous/anxious and is not hyperactive.          Past Medical History:   Diagnosis Date    Allergy     Chest pain     negative stress test and heart cath    Headache     ALMA (obstructive sleep apnea)     Seasonal allergies      Past Surgical History:   Procedure Laterality Date    abscess removal from right side of neck      APPENDECTOMY      BREAST SURGERY Left     lumpectomy, benign    CARDIAC CATHETERIZATION      part of evaluation for chest pain, negative     SECTION      x1    ENDOMETRIAL ABLATION      TUBAL LIGATION       Family History   Problem Relation Age of Onset    Multiple sclerosis Father     Diabetes Father     Heart disease Father 50     CAD    Diabetes Mother     Cancer Mother      breast    Heart disease Mother 50     CAD    Thyroid disease Daughter      hypothyroidism    Transient ischemic attack Sister      Social History     Social History    Marital status: Significant Other     Spouse name: N/A    Number of children: N/A    Years of education: N/A     Occupational History    Not on file.     Social History Main Topics    Smoking status: Current Every Day Smoker     Packs/day: 1.00     Years: 30.00     Types: Cigarettes    Smokeless tobacco: Never Used    Alcohol use No    Drug use: No    Sexual activity: Yes     Partners: Male     Other Topics Concern    Not on file     Social History Narrative    No narrative on file     Review of patient's allergies indicates:  No Known Allergies    Current Outpatient Prescriptions:     butalbital-aspirin-caffeine -40 mg (FIORINAL) -40 mg Cap, Take 1 capsule by mouth every 6 (six) hours as needed., Disp: 12 capsule, Rfl: 2    cetirizine (ZYRTEC) 10 MG tablet, Take 10 mg by mouth once daily., Disp: , Rfl:     ibuprofen (ADVIL,MOTRIN) 200 MG tablet, Take 600 mg by mouth 2 (two) times daily as needed for Pain., Disp: , Rfl:     lamotrigine (LAMICTAL) 25 MG tablet, Take 1 tablet daily for 1 week, then  take 1 po BID for 1 week, then take 2 tablets BID for initial goal of 50 mg BID, Disp: 120 tablet, Rfl: 11    sumatriptan (IMITREX) 50 MG tablet, Take 1 at onset of headache, may repeat in 2 hours to a max of 3 per day, 2 days per week, Disp: 9 tablet, Rfl: 3      Objective:      Vitals:    07/31/18 1543   BP: (!) 140/90   Pulse: 80   Resp: 16     Body mass index is 32.96 kg/m².    Physical Exam    Constitutional:   She appears well-developed and well-nourished. She is well groomed    HENT:    Head: Atraumatic, oral and nasal mucosa intact  Eyes: Conjunctivae and EOM are normal. Pupils are equal, round, and reactive to light OU  Neck: Neck supple. No thyromegaly present  Cardiovascular: Normal rate and normal heart sounds  No murmur heard  Pulmonary/Chest: Effort normal and breath sounds normal  Musculoskeletal: Normal range of motion. No joint stiffness. No vertebral point tenderness  Skin: Skin is warm and dry  Psychiatric: Normal mood and affect     Neuro exam:    Mental status:  Awake, attentive, Alert, oriented to self, place, year and month  Language function is intact    Cranial Nerves:  Smell was not formally evaluated  Cranial Nerves II - XII: intact  Pursuits were smooth, normal saccades, no nystagmus OU  Funduscopic exam - disc were flat and pink, no exudates or hemorrhages OU  Motor - facial movement was symmetrical and normal     Palate moved well and was symmetrical with normal palatal and oral sensation  Tongue movements were full    Coordination:     Rapid alternating movements and rapid finger tapping - normal bilaterally  Finger to nose - normal and symmetric bilaterally   Heel to shin test - normal and symmetric bilaterally   Arm roll - smooth and symmetric   No intentional or positional tremor.     Motor:  Normal muscle bulk and symmetry. No fasciculations were noted    No pronator drift  Strength 5/5 bilaterally     Reflexes:  Tendon reflexes were 2 + at biceps, triceps, brachioradialis,  patellar, and Achilles bilaterally  No clonus was noted     Sensory: Intact to light touch, pin prick in all extremities.     Gait: Romberg absent. Normal gait. Normal arm swing and turns. Good tandem    Review of Data:  Lab Results   Component Value Date     07/13/2017    K 4.2 07/13/2017     07/13/2017    CO2 26 07/13/2017    BUN 11 07/13/2017    CREATININE 0.7 07/13/2017    GLU 94 07/13/2017    AST 14 07/04/2017    ALT 14 07/04/2017    ALBUMIN 3.9 07/04/2017    PROT 6.9 07/04/2017    BILITOT 0.5 07/04/2017    CHOL 192 05/10/2017    HDL 36 (L) 05/10/2017    LDLCALC 138.2 05/10/2017    TRIG 89 05/10/2017       Lab Results   Component Value Date    WBC 10.69 07/13/2017    HGB 15.0 07/13/2017    HCT 43.3 07/13/2017    MCV 90 07/13/2017     (H) 07/13/2017       Lab Results   Component Value Date    TSH 0.826 05/10/2017     Results for orders placed or performed during the hospital encounter of 05/12/17   MRA Brain    Narrative    Technique:Noncontrast 3-D time-of-flight MRA head with review of axial source images and maximum intensity projection reconstructions      Comparison:None available at this institution     Findings:The quality of the study is good. The visualized cervical, petrous, cavernous and supraclinoid internal carotid arteries are unremarkable. The bilateral anterior cerebral and anterior communicating arteries are unremarkable. The bilateral middle cerebral arteries are patent and symmetric without evidence for stenosis.    The vertebral arteries are codominant. No vertebral artery stenosis or dissection is seen. The visualized inferior and superior cerebellar arteries are unremarkable. The basilar artery is unremarkable. The bilateral posterior cerebral arteries are unremarkable. There is a small, patent right posterior communicating artery. The left posterior communicating artery appears to be small or absent.    No intracranial aneurysm is identified.    Impression    1. Normal  MRA of the brain. Specifically, no identifiable intracranial aneurysm is seen. There is reportedly a previous diagnosis of a 1 mm aneurysm which would be difficult to reliably diagnose by MRA.    Epic notification system activated.      Electronically signed by: Elizabeth Thayer MD  Date:     05/12/17  Time:    16:58    Results for orders placed or performed during the hospital encounter of 05/12/17   MRI Brain W WO Contrast    Narrative    Comparison:None available at this institution    Technique: Sagittal T1, axial T1, T2, flair, T2 gradient echo and diffusion and 3 plane postcontrast T1-weighted sequences of the brain. 9 ml Gadavist was administered intravenously for the contrast sequences.    Findings:No reduced diffusion is identified to suggest acute ischemia. The pituitary gland and corpus callosum are unremarkable. No focal abnormal magnetic susceptibility is identified to suggest abnormal parenchymal blood products. No abnormal T1 shortening is identified. No extra-axial fluid collection, mass effect or midline shift is seen. Normal ventricular size is noted. No acute posterior fossa abnormality is seen. No IAC abnormality is seen. Orbital soft tissues are unremarkable. The paranasal sinuses and mastoid air cells appear clear. Slight leftward nasal septal deviation noted. There are a few, small, foci of round T2 and flair hyperintensity in the subcortical white matter of the bilateral frontal and parietal lobes, to a degree which is common in this age group. No focal abnormal contrast enhancement is identified. T2 arterial and dural venous sinus flow voids appear maintained which will be better evaluated on dedicated MRA from the same day. No calvarial or clival abnormality is seen.    Impression    1. No acute abnormality is seen.  2. Mild, nonspecific bilateral subcortical chronic white matter disease to a degree which is common in this age group. Findings commonly reflects small vessel disease such as  arteriolosclerosis.      Electronically signed by: Elizabeth Thayer MD  Date:     05/12/17  Time:    17:00      The patient reported a severe headache. She admitted to nausea, occipitotemporal pain, neck pain. I injected 30 mg of IV Toradol very slow push.            Assessment and Plan   Migraine with aura manifesting with scintillating scotoma. Current status migrainosus for the last 4 weeks.  Toradol 30 mg IV given to break the cycle.  If not effective, make appointment for TAMERA and MARTY blocks.  Continue Lamictal to 25 mg BID.   Trial of Aimovig 70 mg Q28 days  For the acute attack, sumatriptan as first line and limited Fiorinal for rescue.    Seasonal allergies  ALMA  I have discussed the side effects of the medications prescribed and the patient acknowledges understanding    RTC in 3 months with headache diary        Florentin Fischer M.D  Medical Director, Headache and Facial Pain  [facility]

## 2018-08-06 ENCOUNTER — PATIENT MESSAGE (OUTPATIENT)
Dept: NEUROLOGY | Facility: CLINIC | Age: 53
End: 2018-08-06

## 2018-08-08 ENCOUNTER — PATIENT MESSAGE (OUTPATIENT)
Dept: NEUROLOGY | Facility: CLINIC | Age: 53
End: 2018-08-08

## 2018-08-16 ENCOUNTER — PROCEDURE VISIT (OUTPATIENT)
Dept: NEUROLOGY | Facility: CLINIC | Age: 53
End: 2018-08-16
Payer: COMMERCIAL

## 2018-08-16 VITALS
SYSTOLIC BLOOD PRESSURE: 131 MMHG | WEIGHT: 197.63 LBS | HEIGHT: 65 IN | BODY MASS INDEX: 32.93 KG/M2 | DIASTOLIC BLOOD PRESSURE: 86 MMHG | HEART RATE: 80 BPM | RESPIRATION RATE: 16 BRPM

## 2018-08-16 DIAGNOSIS — R51.9 FACIAL PAIN: ICD-10-CM

## 2018-08-16 DIAGNOSIS — M54.81 BILATERAL OCCIPITAL NEURALGIA: ICD-10-CM

## 2018-08-16 PROCEDURE — 64400 NJX AA&/STRD TRIGEMINAL NRV: CPT | Mod: 50,51,S$GLB, | Performed by: PSYCHIATRY & NEUROLOGY

## 2018-08-16 PROCEDURE — 64450 NJX AA&/STRD OTHER PN/BRANCH: CPT | Mod: 50,S$GLB,, | Performed by: PSYCHIATRY & NEUROLOGY

## 2018-08-16 PROCEDURE — 64405 NJX AA&/STRD GR OCPL NRV: CPT | Mod: 50,S$GLB,, | Performed by: PSYCHIATRY & NEUROLOGY

## 2018-08-16 RX ORDER — LIDOCAINE HYDROCHLORIDE 10 MG/ML
1 INJECTION INFILTRATION; PERINEURAL
Status: COMPLETED | OUTPATIENT
Start: 2018-08-16 | End: 2018-08-16

## 2018-08-16 RX ORDER — TRIAMCINOLONE ACETONIDE 40 MG/ML
40 INJECTION, SUSPENSION INTRA-ARTICULAR; INTRAMUSCULAR
Status: COMPLETED | OUTPATIENT
Start: 2018-08-16 | End: 2018-08-16

## 2018-08-16 RX ADMIN — LIDOCAINE HYDROCHLORIDE 1 ML: 10 INJECTION INFILTRATION; PERINEURAL at 08:08

## 2018-08-16 RX ADMIN — TRIAMCINOLONE ACETONIDE 40 MG: 40 INJECTION, SUSPENSION INTRA-ARTICULAR; INTRAMUSCULAR at 08:08

## 2018-08-16 NOTE — PROCEDURES
AURICULOTEMPORAL Block, bilateral  After having been explained the risks and benefits of the procedure, the patient provided consent . Then, the area over the auriculotemporal nerves was prepped with alcohol. Using a 30 gauge 1/2 inch needle, 1mL of local anesthetic was injected into each of the nerves bilaterally. There was negative aspiration for blood. The patient tolerated the procedure well without any apparent complications.  The local anesthetic was bupivacaine 0.25%.  The patient tolerated the procedure well and no complications were encountered.      Greater and Lesser occipital Nerve block BILATERAL   A time out was conducted just before the start of the procedure to verify the correct patient and procedure, procedure location, and all relevant critical information.   After risks and benefits were explained including bleeding, infection, worsening of the pain, damage to the area being injected, weakness, allergic reaction to medications, vascular injection, and nerve damage, and verbal consent was obtained. All questions were answered.   The area of the greater occipital nerves were identified and the skin prepped three times with alcohol and the alcohol allowed to dry. Anatomical landmark of occipital protuberance and mastoid identified and area 2cm lateral to external occipital protuberance located, next a 30 gauge 1 inch needle was placed in the area and after negative aspiration, medication was injected. Procedure repeated in the area of lesser occipital nerves which is 1/3 the distance between mastoid and external occipital protuberance injected after negative aspiration.   Distribution pattern of pain consistent with the referral pattern of trigger points identified   Focal tenderness of multiple trigger points are identified by examination   Medication used: Lidocaine 1% and Bupivacaine 0.25% and Kenalog  .   All questions answered and patient had no complications from procedure.   Instructed  patient to stay very well hydrated with electrolyte liquids and to stay in cool environments to avoid causing triggers for today.       Florentin Fischer M.D  Medical Director, Headache and Facial Pain  [facility]

## 2018-08-22 ENCOUNTER — PATIENT MESSAGE (OUTPATIENT)
Dept: NEUROLOGY | Facility: CLINIC | Age: 53
End: 2018-08-22

## 2018-08-23 ENCOUNTER — PATIENT MESSAGE (OUTPATIENT)
Dept: NEUROLOGY | Facility: CLINIC | Age: 53
End: 2018-08-23

## 2018-08-23 RX ORDER — BUTALBITAL, ACETAMINOPHEN, CAFFEINE AND CODEINE PHOSPHATE 300; 50; 40; 30 MG/1; MG/1; MG/1; MG/1
1 CAPSULE ORAL EVERY 6 HOURS PRN
Qty: 12 CAPSULE | Refills: 3 | Status: SHIPPED | OUTPATIENT
Start: 2018-08-23 | End: 2018-08-24 | Stop reason: SDUPTHER

## 2018-08-24 ENCOUNTER — PATIENT MESSAGE (OUTPATIENT)
Dept: NEUROLOGY | Facility: CLINIC | Age: 53
End: 2018-08-24

## 2018-08-24 ENCOUNTER — TELEPHONE (OUTPATIENT)
Dept: NEUROLOGY | Facility: CLINIC | Age: 53
End: 2018-08-24

## 2018-08-24 RX ORDER — BUTALBITAL, ACETAMINOPHEN, CAFFEINE AND CODEINE PHOSPHATE 300; 50; 40; 30 MG/1; MG/1; MG/1; MG/1
1 CAPSULE ORAL EVERY 6 HOURS PRN
Qty: 12 CAPSULE | Refills: 3 | Status: SHIPPED | OUTPATIENT
Start: 2018-08-24 | End: 2019-05-31 | Stop reason: SDUPTHER

## 2018-08-24 NOTE — TELEPHONE ENCOUNTER
----- Message from Roberta Amaya sent at 8/23/2018  4:52 PM CDT -----  Contact: patient  Patient says that the pharmacy has not received her prescription for butalbital-acetaminop-caf-cod -87-30 mg Cap.   Callback number [phone]

## 2018-08-27 ENCOUNTER — TELEPHONE (OUTPATIENT)
Dept: NEUROLOGY | Facility: CLINIC | Age: 53
End: 2018-08-27

## 2018-08-27 ENCOUNTER — PATIENT MESSAGE (OUTPATIENT)
Dept: NEUROLOGY | Facility: CLINIC | Age: 53
End: 2018-08-27

## 2018-08-27 NOTE — TELEPHONE ENCOUNTER
----- Message from Jose Luis Arnold sent at 8/27/2018 12:51 PM CDT -----  Contact: self   Patient want to speak with a nurse regarding new pain medication still not at pharmacy please call back at [phone] (home)

## 2018-08-28 ENCOUNTER — PATIENT MESSAGE (OUTPATIENT)
Dept: NEUROLOGY | Facility: CLINIC | Age: 53
End: 2018-08-28

## 2018-08-30 ENCOUNTER — PATIENT MESSAGE (OUTPATIENT)
Dept: NEUROLOGY | Facility: CLINIC | Age: 53
End: 2018-08-30

## 2018-08-30 RX ORDER — HYDROCODONE BITARTRATE AND IBUPROFEN 7.5; 2 MG/1; MG/1
TABLET, FILM COATED ORAL
Qty: 14 TABLET | Refills: 0 | Status: SHIPPED | OUTPATIENT
Start: 2018-08-30 | End: 2018-09-07 | Stop reason: SDUPTHER

## 2018-09-07 ENCOUNTER — PATIENT MESSAGE (OUTPATIENT)
Dept: NEUROLOGY | Facility: CLINIC | Age: 53
End: 2018-09-07

## 2018-09-07 DIAGNOSIS — G43.119 INTRACTABLE MIGRAINE WITH AURA WITHOUT STATUS MIGRAINOSUS: Primary | ICD-10-CM

## 2018-09-07 RX ORDER — HYDROCODONE BITARTRATE AND IBUPROFEN 7.5; 2 MG/1; MG/1
TABLET, FILM COATED ORAL
Qty: 14 TABLET | Refills: 0 | OUTPATIENT
Start: 2018-09-07

## 2018-09-07 RX ORDER — HYDROCODONE BITARTRATE AND IBUPROFEN 7.5; 2 MG/1; MG/1
TABLET, FILM COATED ORAL
Qty: 14 TABLET | Refills: 0 | Status: SHIPPED | OUTPATIENT
Start: 2018-09-07 | End: 2018-11-16

## 2018-11-02 ENCOUNTER — TELEPHONE (OUTPATIENT)
Dept: PHARMACY | Facility: CLINIC | Age: 53
End: 2018-11-02

## 2018-11-02 ENCOUNTER — PATIENT MESSAGE (OUTPATIENT)
Dept: NEUROLOGY | Facility: CLINIC | Age: 53
End: 2018-11-02

## 2018-11-02 DIAGNOSIS — G43.719 INTRACTABLE CHRONIC MIGRAINE WITHOUT AURA AND WITHOUT STATUS MIGRAINOSUS: Primary | ICD-10-CM

## 2018-11-08 NOTE — TELEPHONE ENCOUNTER
DOCUMENTATION ONLY:  Prior Authorization for Aimovig approved from 10/07/18 to 05/05/19    Co-pay: $346.79    Patient Assistance IS required.    DOCUMENTATION ONLY:  Financial Assistance for Aimovig approved.  Source: Aimovig Copay Card  BIN : [id number]  SHERRIN : [id number]  ID : [id number]  Group: [id number]  Co-pay: $5.00    Forwarded to the clinical pharmacist for consult and shipment.    -ARR

## 2018-11-16 ENCOUNTER — TELEPHONE (OUTPATIENT)
Dept: PHARMACY | Facility: CLINIC | Age: 53
End: 2018-11-16

## 2018-11-16 NOTE — TELEPHONE ENCOUNTER
Initial Aimovig consult completed on 18. Aimovig 70mg will be shipped on  to arrive at patient's home on  via [employer]. $5.00 copay and permission to charge CC on file. Patient intends to start Aimovig once received. Address confirmed. Confirmed 2 patient identifiers - name and . Therapy Appropriate.    --Injection experience: None  Informed patient on online injection video on  website - link sent via [employer]    Counseled patient on administration directions:  - Inject 70mg (1 auto-injector) into the skin every 28 days.   - Store in refrigerator prior to use (do not freeze, do not shake, keep in original box until use).   - Take out of the refrigerator 30-60 minutes prior to injection.  - Wash hands before and after injection.  - Monthly RX will come with gauze, band aids, and alcohol swabs.  - Patient may inject in either the tops of the thighs, abdomen- but at least 2 inches away from belly button, or the outer part of her upper arm (with assistance).   - Patient is to wipe down the injection site with the alcohol pad, wait to dry.    - Remove white cap from pen  - Gently squeeze OR stretch the area of the cleaned skin and hold it firmly.  Place the pen flat against the skin then push down on the purple button and release - there will be an initial click; in 10-15 seconds you will hear a second click and the window will go from clear to yellow, indicating injection is complete.  - Patient should rotate injection sites.   - Patient will use sharps container; once full, per [place] law, she/ he may lock the sharps container and place in trash. Pharmacy will replace the sharps at no additional charge.    Patient was counseled on possible side effects:  - Injection site reaction: redness, soreness, itching, bruising, which should resolve within 3-5 days.  - Signs/symptoms of infection at the injection site.   - constipation, possible dizziness.     Advised to keep a  calendar to stay compliant.     Consultation included: indication; goals of treatment; administration; storage and handling; side effects; how to handle side effects; the importance of compliance; the importance of keeping all follow up appointments.  Patient understands to report any medication changes to [facility] and provider. All questions answered and addressed to patients satisfaction. I will f/u with patient in 7-10 days from start, [facility] to contact patient in 3 weeks for refills.      Indra Crane, PharmD  Clinical Pharmacist  [facility]  P: [phone]    [facility] sent 11/16/18 at 11:47 am

## 2018-12-12 DIAGNOSIS — G43.719 INTRACTABLE CHRONIC MIGRAINE WITHOUT AURA AND WITHOUT STATUS MIGRAINOSUS: ICD-10-CM

## 2018-12-31 DIAGNOSIS — G43.719 INTRACTABLE CHRONIC MIGRAINE WITHOUT AURA AND WITHOUT STATUS MIGRAINOSUS: Primary | ICD-10-CM

## 2019-01-02 RX ORDER — SUMATRIPTAN 50 MG/1
TABLET, FILM COATED ORAL
Qty: 9 TABLET | Refills: 3 | Status: SHIPPED | OUTPATIENT
Start: 2019-01-02 | End: 2019-05-02 | Stop reason: SDUPTHER

## 2019-01-05 ENCOUNTER — HOSPITAL ENCOUNTER (INPATIENT)
Facility: HOSPITAL | Age: 54
LOS: 1 days | Discharge: HOME | DRG: 390 | End: 2019-01-05
Attending: EMERGENCY MEDICINE | Admitting: INTERNAL MEDICINE
Payer: COMMERCIAL

## 2019-01-05 VITALS
TEMPERATURE: 98 F | WEIGHT: 205 LBS | HEART RATE: 75 BPM | HEIGHT: 65 IN | BODY MASS INDEX: 34.16 KG/M2 | OXYGEN SATURATION: 94 % | RESPIRATION RATE: 18 BRPM | SYSTOLIC BLOOD PRESSURE: 122 MMHG | DIASTOLIC BLOOD PRESSURE: 62 MMHG

## 2019-01-05 DIAGNOSIS — D72.829 LEUKOCYTOSIS, UNSPECIFIED TYPE: ICD-10-CM

## 2019-01-05 DIAGNOSIS — R10.13 EPIGASTRIC PAIN: ICD-10-CM

## 2019-01-05 DIAGNOSIS — K56.7 ILEUS: Primary | ICD-10-CM

## 2019-01-05 PROBLEM — F17.200 SMOKER: Status: ACTIVE | Noted: 2019-01-05

## 2019-01-05 PROBLEM — R07.89 RIGHT-SIDED CHEST WALL PAIN: Status: ACTIVE | Noted: 2019-01-05

## 2019-01-05 PROBLEM — M25.511 ACUTE PAIN OF RIGHT SHOULDER: Status: ACTIVE | Noted: 2019-01-05

## 2019-01-05 LAB
ALBUMIN SERPL BCP-MCNC: 3.4 G/DL
ALBUMIN SERPL BCP-MCNC: 4 G/DL
ALP SERPL-CCNC: 56 U/L
ALP SERPL-CCNC: 63 U/L
ALT SERPL W/O P-5'-P-CCNC: 13 U/L
ALT SERPL W/O P-5'-P-CCNC: 14 U/L
ANION GAP SERPL CALC-SCNC: 12 MMOL/L
ANION GAP SERPL CALC-SCNC: 9 MMOL/L
AST SERPL-CCNC: 12 U/L
AST SERPL-CCNC: 12 U/L
BASOPHILS # BLD AUTO: 0 K/UL
BASOPHILS # BLD AUTO: 0.2 K/UL
BASOPHILS NFR BLD: 0.2 %
BASOPHILS NFR BLD: 1.7 %
BILIRUB SERPL-MCNC: 0.4 MG/DL
BILIRUB SERPL-MCNC: 0.6 MG/DL
BILIRUB UR QL STRIP: NEGATIVE
BUN SERPL-MCNC: 11 MG/DL
BUN SERPL-MCNC: 8 MG/DL
CALCIUM SERPL-MCNC: 11.2 MG/DL
CALCIUM SERPL-MCNC: 9.5 MG/DL
CHLORIDE SERPL-SCNC: 100 MMOL/L
CHLORIDE SERPL-SCNC: 107 MMOL/L
CLARITY UR: CLEAR
CO2 SERPL-SCNC: 22 MMOL/L
CO2 SERPL-SCNC: 26 MMOL/L
COLOR UR: YELLOW
CREAT SERPL-MCNC: 0.6 MG/DL
CREAT SERPL-MCNC: 0.8 MG/DL
DIFFERENTIAL METHOD: ABNORMAL
DIFFERENTIAL METHOD: ABNORMAL
EOSINOPHIL # BLD AUTO: 0.1 K/UL
EOSINOPHIL # BLD AUTO: 0.1 K/UL
EOSINOPHIL NFR BLD: 0.4 %
EOSINOPHIL NFR BLD: 0.7 %
ERYTHROCYTE [DISTWIDTH] IN BLOOD BY AUTOMATED COUNT: 13.6 %
ERYTHROCYTE [DISTWIDTH] IN BLOOD BY AUTOMATED COUNT: 13.8 %
EST. GFR  (AFRICAN AMERICAN): >60 ML/MIN/1.73 M^2
EST. GFR  (AFRICAN AMERICAN): >60 ML/MIN/1.73 M^2
EST. GFR  (NON AFRICAN AMERICAN): >60 ML/MIN/1.73 M^2
EST. GFR  (NON AFRICAN AMERICAN): >60 ML/MIN/1.73 M^2
GLUCOSE SERPL-MCNC: 113 MG/DL
GLUCOSE SERPL-MCNC: 92 MG/DL
GLUCOSE UR QL STRIP: NEGATIVE
HCT VFR BLD AUTO: 41.4 %
HCT VFR BLD AUTO: 43.7 %
HGB BLD-MCNC: 13.9 G/DL
HGB BLD-MCNC: 14.7 G/DL
HGB UR QL STRIP: NEGATIVE
KETONES UR QL STRIP: NEGATIVE
LEUKOCYTE ESTERASE UR QL STRIP: NEGATIVE
LIPASE SERPL-CCNC: 21 U/L
LYMPHOCYTES # BLD AUTO: 2.6 K/UL
LYMPHOCYTES # BLD AUTO: 3.8 K/UL
LYMPHOCYTES NFR BLD: 15.6 %
LYMPHOCYTES NFR BLD: 29 %
MAGNESIUM SERPL-MCNC: 1.7 MG/DL
MCH RBC QN AUTO: 30.4 PG
MCH RBC QN AUTO: 30.8 PG
MCHC RBC AUTO-ENTMCNC: 33.5 G/DL
MCHC RBC AUTO-ENTMCNC: 33.6 G/DL
MCV RBC AUTO: 91 FL
MCV RBC AUTO: 92 FL
MONOCYTES # BLD AUTO: 0.7 K/UL
MONOCYTES # BLD AUTO: 0.8 K/UL
MONOCYTES NFR BLD: 4 %
MONOCYTES NFR BLD: 5.8 %
NEUTROPHILS # BLD AUTO: 13.4 K/UL
NEUTROPHILS # BLD AUTO: 8.2 K/UL
NEUTROPHILS NFR BLD: 62.8 %
NEUTROPHILS NFR BLD: 79.8 %
NITRITE UR QL STRIP: NEGATIVE
PH UR STRIP: 6 [PH] (ref 5–8)
PHOSPHATE SERPL-MCNC: 3.5 MG/DL
PLATELET # BLD AUTO: 300 K/UL
PLATELET # BLD AUTO: 345 K/UL
PMV BLD AUTO: 7.8 FL
PMV BLD AUTO: 8.2 FL
POTASSIUM SERPL-SCNC: 4 MMOL/L
POTASSIUM SERPL-SCNC: 4.2 MMOL/L
PROT SERPL-MCNC: 6.1 G/DL
PROT SERPL-MCNC: 7 G/DL
PROT UR QL STRIP: NEGATIVE
RBC # BLD AUTO: 4.5 M/UL
RBC # BLD AUTO: 4.83 M/UL
SODIUM SERPL-SCNC: 138 MMOL/L
SODIUM SERPL-SCNC: 138 MMOL/L
SP GR UR STRIP: 1.01 (ref 1–1.03)
URN SPEC COLLECT METH UR: NORMAL
UROBILINOGEN UR STRIP-ACNC: NEGATIVE EU/DL
WBC # BLD AUTO: 13 K/UL
WBC # BLD AUTO: 16.8 K/UL

## 2019-01-05 PROCEDURE — 84100 ASSAY OF PHOSPHORUS: CPT

## 2019-01-05 PROCEDURE — C9113 INJ PANTOPRAZOLE SODIUM, VIA: HCPCS | Performed by: NURSE PRACTITIONER

## 2019-01-05 PROCEDURE — 99285 EMERGENCY DEPT VISIT HI MDM: CPT | Mod: 25

## 2019-01-05 PROCEDURE — 96375 TX/PRO/DX INJ NEW DRUG ADDON: CPT | Performed by: EMERGENCY MEDICINE

## 2019-01-05 PROCEDURE — 36415 COLL VENOUS BLD VENIPUNCTURE: CPT

## 2019-01-05 PROCEDURE — 83690 ASSAY OF LIPASE: CPT

## 2019-01-05 PROCEDURE — 96361 HYDRATE IV INFUSION ADD-ON: CPT

## 2019-01-05 PROCEDURE — 80053 COMPREHEN METABOLIC PANEL: CPT | Mod: 91

## 2019-01-05 PROCEDURE — 63600175 PHARM REV CODE 636 W HCPCS: Performed by: EMERGENCY MEDICINE

## 2019-01-05 PROCEDURE — 25000003 PHARM REV CODE 250: Performed by: EMERGENCY MEDICINE

## 2019-01-05 PROCEDURE — 96374 THER/PROPH/DIAG INJ IV PUSH: CPT

## 2019-01-05 PROCEDURE — 63600175 PHARM REV CODE 636 W HCPCS: Performed by: NURSE PRACTITIONER

## 2019-01-05 PROCEDURE — 25000003 PHARM REV CODE 250: Performed by: NURSE PRACTITIONER

## 2019-01-05 PROCEDURE — 80053 COMPREHEN METABOLIC PANEL: CPT

## 2019-01-05 PROCEDURE — 12000002 HC ACUTE/MED SURGE SEMI-PRIVATE ROOM

## 2019-01-05 PROCEDURE — 81003 URINALYSIS AUTO W/O SCOPE: CPT

## 2019-01-05 PROCEDURE — 83735 ASSAY OF MAGNESIUM: CPT

## 2019-01-05 PROCEDURE — S4991 NICOTINE PATCH NONLEGEND: HCPCS | Performed by: NURSE PRACTITIONER

## 2019-01-05 PROCEDURE — 85025 COMPLETE CBC W/AUTO DIFF WBC: CPT | Mod: 91

## 2019-01-05 RX ORDER — ENOXAPARIN SODIUM 100 MG/ML
40 INJECTION SUBCUTANEOUS EVERY 24 HOURS
Status: DISCONTINUED | OUTPATIENT
Start: 2019-01-05 | End: 2019-01-05 | Stop reason: HOSPADM

## 2019-01-05 RX ORDER — PANTOPRAZOLE SODIUM 40 MG/1
40 TABLET, DELAYED RELEASE ORAL
Status: COMPLETED | OUTPATIENT
Start: 2019-01-05 | End: 2019-01-05

## 2019-01-05 RX ORDER — PANTOPRAZOLE SODIUM 40 MG/10ML
40 INJECTION, POWDER, LYOPHILIZED, FOR SOLUTION INTRAVENOUS DAILY
Status: DISCONTINUED | OUTPATIENT
Start: 2019-01-05 | End: 2019-01-05 | Stop reason: HOSPADM

## 2019-01-05 RX ORDER — IBUPROFEN 200 MG
1 TABLET ORAL DAILY
Status: DISCONTINUED | OUTPATIENT
Start: 2019-01-05 | End: 2019-01-05 | Stop reason: HOSPADM

## 2019-01-05 RX ORDER — SODIUM CHLORIDE 9 MG/ML
INJECTION, SOLUTION INTRAVENOUS CONTINUOUS
Status: DISCONTINUED | OUTPATIENT
Start: 2019-01-05 | End: 2019-01-05 | Stop reason: HOSPADM

## 2019-01-05 RX ORDER — SODIUM CHLORIDE 9 MG/ML
1000 INJECTION, SOLUTION INTRAVENOUS
Status: COMPLETED | OUTPATIENT
Start: 2019-01-05 | End: 2019-01-05

## 2019-01-05 RX ORDER — IBUPROFEN 200 MG
24 TABLET ORAL
Status: DISCONTINUED | OUTPATIENT
Start: 2019-01-05 | End: 2019-01-05 | Stop reason: HOSPADM

## 2019-01-05 RX ORDER — IBUPROFEN 800 MG/1
800 TABLET ORAL EVERY 6 HOURS PRN
Qty: 20 TABLET | Refills: 1 | Status: SHIPPED | OUTPATIENT
Start: 2019-01-05 | End: 2019-01-10

## 2019-01-05 RX ORDER — IBUPROFEN 200 MG
16 TABLET ORAL
Status: DISCONTINUED | OUTPATIENT
Start: 2019-01-05 | End: 2019-01-05 | Stop reason: HOSPADM

## 2019-01-05 RX ORDER — KETOROLAC TROMETHAMINE 30 MG/ML
15 INJECTION, SOLUTION INTRAMUSCULAR; INTRAVENOUS EVERY 6 HOURS PRN
Status: DISCONTINUED | OUTPATIENT
Start: 2019-01-05 | End: 2019-01-05 | Stop reason: HOSPADM

## 2019-01-05 RX ORDER — ACETAMINOPHEN 325 MG/1
650 TABLET ORAL EVERY 4 HOURS PRN
Status: DISCONTINUED | OUTPATIENT
Start: 2019-01-05 | End: 2019-01-05 | Stop reason: HOSPADM

## 2019-01-05 RX ORDER — LAMOTRIGINE 25 MG/1
75 TABLET ORAL 2 TIMES DAILY
Status: DISCONTINUED | OUTPATIENT
Start: 2019-01-05 | End: 2019-01-05

## 2019-01-05 RX ORDER — GLUCAGON 1 MG
1 KIT INJECTION
Status: DISCONTINUED | OUTPATIENT
Start: 2019-01-05 | End: 2019-01-05 | Stop reason: HOSPADM

## 2019-01-05 RX ORDER — ONDANSETRON 2 MG/ML
4 INJECTION INTRAMUSCULAR; INTRAVENOUS EVERY 6 HOURS PRN
Status: DISCONTINUED | OUTPATIENT
Start: 2019-01-05 | End: 2019-01-05 | Stop reason: HOSPADM

## 2019-01-05 RX ORDER — KETOROLAC TROMETHAMINE 30 MG/ML
30 INJECTION, SOLUTION INTRAMUSCULAR; INTRAVENOUS
Status: COMPLETED | OUTPATIENT
Start: 2019-01-05 | End: 2019-01-05

## 2019-01-05 RX ORDER — METOCLOPRAMIDE HYDROCHLORIDE 5 MG/ML
10 INJECTION INTRAMUSCULAR; INTRAVENOUS EVERY 6 HOURS
Status: DISCONTINUED | OUTPATIENT
Start: 2019-01-05 | End: 2019-01-05 | Stop reason: HOSPADM

## 2019-01-05 RX ORDER — SODIUM CHLORIDE 0.9 % (FLUSH) 0.9 %
5 SYRINGE (ML) INJECTION
Status: DISCONTINUED | OUTPATIENT
Start: 2019-01-05 | End: 2019-01-05 | Stop reason: HOSPADM

## 2019-01-05 RX ORDER — MORPHINE SULFATE 10 MG/ML
10 INJECTION INTRAMUSCULAR; INTRAVENOUS; SUBCUTANEOUS
Status: DISCONTINUED | OUTPATIENT
Start: 2019-01-05 | End: 2019-01-05

## 2019-01-05 RX ADMIN — SODIUM CHLORIDE: 0.9 INJECTION, SOLUTION INTRAVENOUS at 06:01

## 2019-01-05 RX ADMIN — SODIUM CHLORIDE 1000 ML: 0.9 INJECTION, SOLUTION INTRAVENOUS at 01:01

## 2019-01-05 RX ADMIN — KETOROLAC TROMETHAMINE 15 MG: 30 INJECTION, SOLUTION INTRAMUSCULAR at 12:01

## 2019-01-05 RX ADMIN — METOCLOPRAMIDE 10 MG: 5 INJECTION, SOLUTION INTRAMUSCULAR; INTRAVENOUS at 12:01

## 2019-01-05 RX ADMIN — METOCLOPRAMIDE 10 MG: 5 INJECTION, SOLUTION INTRAMUSCULAR; INTRAVENOUS at 06:01

## 2019-01-05 RX ADMIN — KETOROLAC TROMETHAMINE 30 MG: 30 INJECTION, SOLUTION INTRAMUSCULAR at 04:01

## 2019-01-05 RX ADMIN — LIDOCAINE HYDROCHLORIDE: 20 SOLUTION ORAL; TOPICAL at 01:01

## 2019-01-05 RX ADMIN — LIDOCAINE HYDROCHLORIDE: 20 SOLUTION ORAL; TOPICAL at 04:01

## 2019-01-05 RX ADMIN — NICOTINE 1 PATCH: 14 PATCH, EXTENDED RELEASE TRANSDERMAL at 06:01

## 2019-01-05 RX ADMIN — PANTOPRAZOLE SODIUM 40 MG: 40 TABLET, DELAYED RELEASE ORAL at 01:01

## 2019-01-05 RX ADMIN — PANTOPRAZOLE SODIUM 40 MG: 40 INJECTION, POWDER, LYOPHILIZED, FOR SOLUTION INTRAVENOUS at 08:01

## 2019-01-05 NOTE — SUBJECTIVE & OBJECTIVE
Past Medical History:   Diagnosis Date    Allergy     Chest pain     negative stress test and heart cath    Headache     Kidney stone     ALMA (obstructive sleep apnea)     Seasonal allergies        Past Surgical History:   Procedure Laterality Date    abscess removal from right side of neck      APPENDECTOMY      BREAST SURGERY Left     lumpectomy, benign    CARDIAC CATHETERIZATION      part of evaluation for chest pain, negative     SECTION      x1    ENDOMETRIAL ABLATION      TUBAL LIGATION      UPPER GASTROINTESTINAL ENDOSCOPY  prior to        Review of patient's allergies indicates:   Allergen Reactions    Topiramate      Marked drowsiness         No current facility-administered medications on file prior to encounter.      Current Outpatient Medications on File Prior to Encounter   Medication Sig    butalbital-acetaminop-caf-cod -62-30 mg Cap Take 1 capsule by mouth every 6 (six) hours as needed.    cetirizine (ZYRTEC) 10 MG tablet Take 10 mg by mouth once daily.    erenumab-aooe (AIMOVIG AUTOINJECTOR) 70 mg/mL AtIn Inject 1 mL (70 mg total) into the skin every 28 days.    ibuprofen (ADVIL,MOTRIN) 200 MG tablet Take 600 mg by mouth 2 (two) times daily as needed for Pain.    lamoTRIgine (LAMICTAL) 25 MG tablet Take 3 caps BID (75 mg TID)    phentermine 15 MG capsule     sumatriptan (IMITREX) 50 MG tablet Take 1 at onset of headache, may repeat in 2 hours to a max of 3 per day, 2 days per week    sumatriptan (IMITREX) 50 MG tablet TAKE 1 TABLET AT ONSET OF HEADACHE MAY REPEAT IN 2 HOURS TO A MAX OF 3 PER DAY 2 DAYS PER WEEK     Family History     Problem Relation (Age of Onset)    Cancer Mother    Diabetes Father, Mother    Heart disease Father (50), Mother (50)    Multiple sclerosis Father    Thyroid disease Daughter    Transient ischemic attack Sister        Tobacco Use    Smoking status: Current Every Day Smoker     Packs/day: 0.75     Years: 30.00     Pack years:  22.50     Types: Cigarettes    Smokeless tobacco: Never Used   Substance and Sexual Activity    Alcohol use: No    Drug use: No    Sexual activity: Yes     Partners: Male     Review of Systems   Constitutional: Positive for appetite change. Negative for chills and fever.   HENT: Negative for sore throat and trouble swallowing.    Eyes: Negative for photophobia and visual disturbance.   Respiratory: Negative for cough, shortness of breath and wheezing.    Cardiovascular: Negative for chest pain and palpitations.   Gastrointestinal: Positive for abdominal pain (RUQ), constipation and nausea. Negative for vomiting.   Endocrine: Negative for polyphagia and polyuria.   Genitourinary: Negative for dysuria and flank pain.   Musculoskeletal: Positive for arthralgias (right shoulder). Negative for gait problem.   Skin: Negative for rash and wound.   Neurological: Negative for dizziness and weakness.   Hematological: Negative for adenopathy.   Psychiatric/Behavioral: Negative for agitation and confusion.   All other systems reviewed and are negative.    Objective:     Vital Signs (Most Recent):  Temp: 96.4 °F (35.8 °C) (01/05/19 0548)  Pulse: 81 (01/05/19 0548)  Resp: 16 (01/05/19 0548)  BP: 126/61 (01/05/19 0548)  SpO2: (!) 94 % (01/05/19 0548) Vital Signs (24h Range):  Temp:  [96.4 °F (35.8 °C)-97.9 °F (36.6 °C)] 96.4 °F (35.8 °C)  Pulse:  [72-90] 81  Resp:  [16-18] 16  SpO2:  [93 %-98 %] 94 %  BP: (112-146)/() 126/61     Weight: 93 kg (205 lb)  Body mass index is 34.11 kg/m².    Physical Exam   Constitutional: She is oriented to person, place, and time. She appears well-developed and well-nourished.   HENT:   Head: Normocephalic and atraumatic.   Eyes: Conjunctivae and EOM are normal. Pupils are equal, round, and reactive to light.   Neck: Normal range of motion. Neck supple. No JVD present.   Cardiovascular: Normal rate, regular rhythm, normal heart sounds and intact distal pulses.   Pulmonary/Chest: Effort  normal and breath sounds normal. No respiratory distress.   Abdominal: Soft. Bowel sounds are normal. There is tenderness (RUQ). There is no guarding.   Genitourinary:   Genitourinary Comments: deferred   Musculoskeletal: Normal range of motion. She exhibits tenderness (right scapula).   Neurological: She is alert and oriented to person, place, and time.   Skin: Skin is warm and dry. No rash noted.   Psychiatric: She has a normal mood and affect. Her behavior is normal. Judgment and thought content normal.   Nursing note and vitals reviewed.        CRANIAL NERVES     CN III, IV, VI   Pupils are equal, round, and reactive to light.  Extraocular motions are normal.        Significant Labs:   CBC:   Recent Labs   Lab 01/03/19  1100 01/05/19  0320   WBC 13.10* 16.80*   HGB 15.2 14.7   HCT 45.4 43.7   * 345     CMP:   Recent Labs   Lab 01/03/19  1100 01/05/19  0135    138   K 4.1 4.2    100   CO2 24 26   GLU 86 113*   BUN 8 11   CREATININE 0.7 0.8   CALCIUM 9.7 11.2*   PROT 7.2 7.0   ALBUMIN 4.0 4.0   BILITOT 0.5 0.4   ALKPHOS 65 63   AST 13 12   ALT 14 14   ANIONGAP 11 12   EGFRNONAA >60 >60     Lipase:   Recent Labs   Lab 01/05/19  0135   LIPASE 21     Urine Studies:   Recent Labs   Lab 01/05/19  0234   COLORU Yellow   APPEARANCEUA Clear   PHUR 6.0   SPECGRAV 1.010   PROTEINUA Negative   GLUCUA Negative   KETONESU Negative   BILIRUBINUA Negative   OCCULTUA Negative   NITRITE Negative   UROBILINOGEN Negative   LEUKOCYTESUR Negative       Significant Imaging: US abdomen ([facility]):   1. Mild hepatic steatosis.  2. Pancreas is partially obscured secondary to gas.    CT abd/pelvis ([facility]): 1. Ileus bowel gas pattern.  2. Negative for urinary tract stone/obstruction.  3. Right ovarian cyst, likely functional.

## 2019-01-05 NOTE — PLAN OF CARE
Pt lives with spouse, Tomás [phone]. Reports independence in ADL and denies HH and DME. Verified PCP as Dr. Garza and pharmacy as [facility]  rd. Pt has [employer] insurance. Pt has no discharge needs at this time. Nehal Lee LMSW     01/05/19 1041   Discharge Assessment   Assessment Type Discharge Planning Assessment   Confirmed/corrected address and phone number on facesheet? Yes   Assessment information obtained from? Patient   Communicated expected length of stay with patient/caregiver no   Prior to hospitilization cognitive status: Alert/Oriented   Prior to hospitalization functional status: Independent   Current cognitive status: Alert/Oriented   Current Functional Status: Independent   Lives With spouse   Able to Return to Prior Arrangements yes   Is patient able to care for self after discharge? Yes   Who are your caregiver(s) and their phone number(s)? Tomás [phone]   Readmission Within the Last 30 Days no previous admission in last 30 days   Patient currently being followed by outpatient case management? No   Patient currently receives any other outside agency services? No   Equipment Currently Used at Home none   Do you have any problems affording any of your prescribed medications? No   Is the patient taking medications as prescribed? yes   Does the patient have transportation home? Yes   Transportation Anticipated family or friend will provide   Does the patient receive services at the [facility]? No   Discharge Plan A Home with family   Discharge Plan B Home   Patient/Family in Agreement with Plan yes

## 2019-01-05 NOTE — PROGRESS NOTES
[facility] Hospital Medicine  Progress Note    Patient Name: Marsha Choi  MRN: [id number]  Patient Class: IP- Inpatient   Admission Date: 1/5/2019  Length of Stay: 0 days  Attending Physician: Gavin John MD  Primary Care Provider: Lovely Garza MD        Subjective:     Principal Problem:Ileus    HPI:  Marsha Choi is a 52 y/o female with a PMHx of migraine headache who presented to the ED with c/o right shoulder pain which radiates into the right chest wall and right shoulder blade.  She was seen in the ED 2 days ago for the same issue and symptoms have progressed.  Work-up in the ED today identifies an ileus accompanied by leukocytosis.  Pt denies fever, shortness of breath, and dysuria.  Her last BM was 4 days ago and she has had some nausea but no vomiting.  She is admitted to the service of hospital medicine for further treatment.    Hospital Course:  No notes on file    Interval History: pt denies abd pain or nausea/vomiting. Only complaint is shoulder and rib pain    Review of Systems   Respiratory: Negative for shortness of breath.    Cardiovascular: Negative for chest pain.   Gastrointestinal: Negative for abdominal pain.   Genitourinary: Negative for dysuria.   Musculoskeletal: Positive for arthralgias.     Objective:     Vital Signs (Most Recent):  Temp: 97.5 °F (36.4 °C) (01/05/19 1259)  Pulse: 75 (01/05/19 1259)  Resp: 18 (01/05/19 1259)  BP: 122/62 (01/05/19 1259)  SpO2: (!) 94 % (01/05/19 1259) Vital Signs (24h Range):  Temp:  [96.4 °F (35.8 °C)-98.2 °F (36.8 °C)] 97.5 °F (36.4 °C)  Pulse:  [66-90] 75  Resp:  [16-18] 18  SpO2:  [93 %-98 %] 94 %  BP: (112-146)/() 122/62     Weight: 93 kg (205 lb)  Body mass index is 34.11 kg/m².  No intake or output data in the 24 hours ending 01/05/19 1318   Physical Exam   Constitutional: She is oriented to person, place, and time.   HENT:   Head: Normocephalic and atraumatic.   Neck: Neck supple. No tracheal deviation  present.   Cardiovascular: Normal rate, regular rhythm and normal heart sounds.   Pulmonary/Chest: Effort normal. No respiratory distress.   Musculoskeletal: Normal range of motion. She exhibits no edema.   Neurological: She is oriented to person, place, and time.       Significant Labs: All pertinent labs within the past 24 hours have been reviewed.    Significant Imaging: I have reviewed all pertinent imaging results/findings within the past 24 hours.    Assessment/Plan:      * Ileus    As Identified per CT abd/pelvis.  NPO  Initiate IVF  Ketorolac as needed for pain-avoid narcotic pain control  IV reglan q6hr  Ambulation  Will advance diet as tolerated       Smoker    Health hazards associated with cigarette smoking were reviewed with patient and cessation was encouraged. Nicotine replacement and counseling options were discussed.  Spent 3 minutes counseling on cessation, patient not ready to quit-- nicotine patch ordered.           VTE Risk Mitigation (From admission, onward)        Ordered     enoxaparin injection 40 mg  Daily      01/05/19 0553     IP VTE HIGH RISK PATIENT  Once      01/05/19 0553     Place SEBASTIEN hose  Until discontinued      01/05/19 0553     Place sequential compression device  Until discontinued      01/05/19 0553              Gavin John MD  Department of Hospital Medicine   [facility]

## 2019-01-05 NOTE — ED PROVIDER NOTES
"Encounter Date: 2019    SCRIBE #1 NOTE: I, Mayito Winkler, am scribing for, and in the presence of, Dr. Mcclure.       History     Chief Complaint   Patient presents with    Abdominal Pain     right upper     Shoulder Pain     right radiating from abd       Time seen by provider: 1:02 AM on 2019    Marsha Choi is a 53 y.o. female with a hx of kidney stones and HA who presents to the ED c/o RUQ abd pain and shoulder pain with radiation from chest x 4 days. The shoulder pain is worsened by taking a deep breath. The majority of the pain is "underneath my rib cage." She denies pain after eating. Pt denies nausea and fever. Allergens include Totipalmate.       The history is provided by the patient.     Review of patient's allergies indicates:   Allergen Reactions    Topiramate      Marked drowsiness       Past Medical History:   Diagnosis Date    Allergy     Chest pain     negative stress test and heart cath    Headache     Kidney stone     ALMA (obstructive sleep apnea)     Seasonal allergies      Past Surgical History:   Procedure Laterality Date    abscess removal from right side of neck      APPENDECTOMY      BREAST SURGERY Left     lumpectomy, benign    CARDIAC CATHETERIZATION      part of evaluation for chest pain, negative     SECTION      x1    ENDOMETRIAL ABLATION      TUBAL LIGATION      UPPER GASTROINTESTINAL ENDOSCOPY  prior to      Family History   Problem Relation Age of Onset    Multiple sclerosis Father     Diabetes Father     Heart disease Father 50        CAD    Diabetes Mother     Cancer Mother         breast    Heart disease Mother 50        CAD    Thyroid disease Daughter         hypothyroidism    Transient ischemic attack Sister     Colon cancer Neg Hx     Colon polyps Neg Hx     Crohn's disease Neg Hx     Esophageal cancer Neg Hx     Stomach cancer Neg Hx     Ulcerative colitis Neg Hx      Social History     Tobacco Use    Smoking status: " Current Every Day Smoker     Packs/day: 0.75     Years: 30.00     Pack years: 22.50     Types: Cigarettes    Smokeless tobacco: Never Used   Substance Use Topics    Alcohol use: No    Drug use: No     Review of Systems   Constitutional: Negative for activity change, appetite change, chills, fatigue and fever.   HENT: Negative for dental problem.    Eyes: Negative for visual disturbance.   Respiratory: Negative for apnea and shortness of breath.    Cardiovascular: Negative for chest pain and palpitations.   Gastrointestinal: Positive for abdominal pain. Negative for abdominal distention, nausea and vomiting.   Genitourinary: Negative for difficulty urinating.   Musculoskeletal: Positive for arthralgias (Rt shoulder). Negative for neck pain.   Skin: Negative for pallor and rash.   Neurological: Negative for headaches.   Hematological: Does not bruise/bleed easily.   Psychiatric/Behavioral: Negative for agitation.       Physical Exam     Initial Vitals [01/05/19 0051]   BP Pulse Resp Temp SpO2   (!) 140/67 90 18 97.9 °F (36.6 °C) 98 %      MAP       --         Physical Exam    Nursing note and vitals reviewed.  Constitutional: She appears well-developed and well-nourished.   HENT:   Head: Normocephalic and atraumatic.   Eyes: Conjunctivae are normal.   Neck: Normal range of motion. Neck supple.   Cardiovascular: Normal rate, regular rhythm and normal heart sounds. Exam reveals no gallop and no friction rub.    No murmur heard.  Pulmonary/Chest: Effort normal and breath sounds normal. No respiratory distress. She has no wheezes. She has no rhonchi. She has no rales.   Abdominal: Soft. She exhibits no distension. There is tenderness in the right upper quadrant. There is positive Hilton's sign.   Musculoskeletal: Normal range of motion.   Neurological: She is alert and oriented to person, place, and time.   Skin: Skin is warm and dry. No erythema.   Psychiatric: She has a normal mood and affect.         ED Course    Procedures  Labs Reviewed   URINALYSIS, REFLEX TO URINE CULTURE   LIPASE   COMPREHENSIVE METABOLIC PANEL   CBC W/ AUTO DIFFERENTIAL          Imaging Results    None          Medical Decision Making:   History:   Old Medical Records: I decided to obtain old medical records.  Clinical Tests:   Lab Tests: Ordered and Reviewed  Radiological Study: Ordered and Reviewed  ED Management:  53-year-old female presents with both right shoulder pain and right upper quadrant pain worsened with deep breath. She was evaluated earlier today for her shoulder pain with normal x-rays.  Chest x-ray additionally was normal with no evidence of pneumothorax, lung mass or pneumonia.  Her physical exam reveals right upper quadrant tenderness with a positive Hilton sign concerning for biliary disease.  Right upper quadrant ultrasound is normal with no evidence of cholelithiasis or cholecystitis.  CT of the abdomen and pelvis is normal except for a gas pattern consistent with an ileus.  She has ongoing pain with modest leukocytosis (WBC 25543) therefore, she will be admitted for pain management and evaluation by Gastroenterology.  The symptoms most likely reflect gastritis/peptic ulcer disease.  There is no evidence of perforated viscus.  She has a normal lie face with no evidence of pancreatitis.            Scribe Attestation:   Scribe #1: I performed the above scribed service and the documentation accurately describes the services I performed. I attest to the accuracy of the note.               Clinical Impression:   No diagnosis found.                             Ari Mcclure III, MD  01/05/19 0683

## 2019-01-05 NOTE — ASSESSMENT & PLAN NOTE
As Identified per CT abd/pelvis.  NPO  Initiate IVF  Ketorolac as needed for pain-avoid narcotic pain control  IV reglan q6hr  Ambulation  Will advance diet as tolerated

## 2019-01-05 NOTE — PLAN OF CARE
01/05/19 1511   Final Note   Assessment Type Final Discharge Note   Anticipated Discharge Disposition Home

## 2019-01-05 NOTE — ASSESSMENT & PLAN NOTE
As Identified per CT abd/pelvis.  NPO  Initiate IVF  Ketorolac as needed for pain-avoid narcotic pain control  IV reglan q6hr  Ambulation  KUB in am

## 2019-01-05 NOTE — HPI
Marsha Choi is a 54 y/o female with a PMHx of migraine headache who presented to the ED with c/o right shoulder pain which radiates into the right chest wall and right shoulder blade.  She was seen in the ED 2 days ago for the same issue and symptoms have progressed.  Work-up in the ED today identifies an ileus accompanied by leukocytosis.  Pt denies fever, shortness of breath, and dysuria.  Her last BM was 4 days ago and she has had some nausea but no vomiting.  She is admitted to the service of hospital medicine for further treatment.

## 2019-01-05 NOTE — PLAN OF CARE
Discharge instructions given to patient. Patient verbalized complete understanding.  IV discontinued with catheter intact, pressure applied to site and secured with tape. Patient tolerated well.

## 2019-01-05 NOTE — ED NOTES
Assumed care:  Patient awake, alert and oriented x 3, skin warm and dry, in NAD with family at bedside.    Patient identifiers for Marsha Choi checked and correct.  LOC:  Patient is awake, alert, and aware of environment with an appropriate affect. Patient is oriented x 3 and speaking appropriately.  APPEARANCE:  Patient resting comfortably and in no acute distress. Patient is clean and well groomed, patient's clothing is properly fastened.  SKIN:  The skin is warm and dry. Patient has normal skin turgor and moist mucus membranes. Skin is intact; no bruising or breakdown noted.  MUSCULOSKELETAL:  Patient is moving all extremities well, no obvious deformities noted. Pulses intact.   RESPIRATORY:  Airway is open and patent. Respirations are spontaneous and non-labored with normal effort and rate.  CARDIAC:  Patient has a normal rate and rhythm. No peripheral edema noted. Capillary refill < 3 seconds.  ABDOMEN:  No distention noted.  Soft and non-tender upon palpation.  RUQ pain  NEUROLOGICAL:  PERRL. Facial expression is symmetrical. Hand grasps are equal bilaterally. Normal sensation in all extremities when touched with finger.  Allergies reported:    Review of patient's allergies indicates:   Allergen Reactions    Topiramate      Marked drowsiness       OTHER NOTES:  CO right upper abd pain and right shoulder pain.

## 2019-01-05 NOTE — H&P
[facility] Medicine  History & Physical    Patient Name: Marsha Choi  MRN: [id number]  Admission Date: 2019  Attending Physician: Johnnie Daniel MD   Primary Care Provider: Lovely Garza MD         Patient information was obtained from patient and ER records.     Subjective:     Principal Problem:Ileus    Chief Complaint:   Chief Complaint   Patient presents with    Abdominal Pain     right upper     Shoulder Pain     right radiating from abd        HPI: Marsha Choi is a 52 y/o female with a PMHx of migraine headache who presented to the ED with c/o right shoulder pain which radiates into the right chest wall and right shoulder blade.  She was seen in the ED 2 days ago for the same issue and symptoms have progressed.  Work-up in the ED today identifies an ileus accompanied by leukocytosis.  Pt denies fever, shortness of breath, and dysuria.  Her last BM was 4 days ago and she has had some nausea but no vomiting.  She is admitted to the service of hospital medicine for further treatment.    Past Medical History:   Diagnosis Date    Allergy     Chest pain     negative stress test and heart cath    Headache     Kidney stone     ALMA (obstructive sleep apnea)     Seasonal allergies        Past Surgical History:   Procedure Laterality Date    abscess removal from right side of neck      APPENDECTOMY      BREAST SURGERY Left     lumpectomy, benign    CARDIAC CATHETERIZATION      part of evaluation for chest pain, negative     SECTION      x1    ENDOMETRIAL ABLATION      TUBAL LIGATION      UPPER GASTROINTESTINAL ENDOSCOPY  prior to        Review of patient's allergies indicates:   Allergen Reactions    Topiramate      Marked drowsiness         No current facility-administered medications on file prior to encounter.      Current Outpatient Medications on File Prior to Encounter   Medication Sig    butalbital-acetaminop-caf-cod -82-30 mg Cap  Take 1 capsule by mouth every 6 (six) hours as needed.    cetirizine (ZYRTEC) 10 MG tablet Take 10 mg by mouth once daily.    erenumab-aooe (AIMOVIG AUTOINJECTOR) 70 mg/mL AtIn Inject 1 mL (70 mg total) into the skin every 28 days.    ibuprofen (ADVIL,MOTRIN) 200 MG tablet Take 600 mg by mouth 2 (two) times daily as needed for Pain.    lamoTRIgine (LAMICTAL) 25 MG tablet Take 3 caps BID (75 mg TID)    phentermine 15 MG capsule     sumatriptan (IMITREX) 50 MG tablet Take 1 at onset of headache, may repeat in 2 hours to a max of 3 per day, 2 days per week    sumatriptan (IMITREX) 50 MG tablet TAKE 1 TABLET AT ONSET OF HEADACHE MAY REPEAT IN 2 HOURS TO A MAX OF 3 PER DAY 2 DAYS PER WEEK     Family History     Problem Relation (Age of Onset)    Cancer Mother    Diabetes Father, Mother    Heart disease Father (50), Mother (50)    Multiple sclerosis Father    Thyroid disease Daughter    Transient ischemic attack Sister        Tobacco Use    Smoking status: Current Every Day Smoker     Packs/day: 0.75     Years: 30.00     Pack years: 22.50     Types: Cigarettes    Smokeless tobacco: Never Used   Substance and Sexual Activity    Alcohol use: No    Drug use: No    Sexual activity: Yes     Partners: Male     Review of Systems   Constitutional: Positive for appetite change. Negative for chills and fever.   HENT: Negative for sore throat and trouble swallowing.    Eyes: Negative for photophobia and visual disturbance.   Respiratory: Negative for cough, shortness of breath and wheezing.    Cardiovascular: Negative for chest pain and palpitations.   Gastrointestinal: Positive for abdominal pain (RUQ), constipation and nausea. Negative for vomiting.   Endocrine: Negative for polyphagia and polyuria.   Genitourinary: Negative for dysuria and flank pain.   Musculoskeletal: Positive for arthralgias (right shoulder). Negative for gait problem.   Skin: Negative for rash and wound.   Neurological: Negative for dizziness and  weakness.   Hematological: Negative for adenopathy.   Psychiatric/Behavioral: Negative for agitation and confusion.   All other systems reviewed and are negative.    Objective:     Vital Signs (Most Recent):  Temp: 96.4 °F (35.8 °C) (01/05/19 0548)  Pulse: 81 (01/05/19 0548)  Resp: 16 (01/05/19 0548)  BP: 126/61 (01/05/19 0548)  SpO2: (!) 94 % (01/05/19 0548) Vital Signs (24h Range):  Temp:  [96.4 °F (35.8 °C)-97.9 °F (36.6 °C)] 96.4 °F (35.8 °C)  Pulse:  [72-90] 81  Resp:  [16-18] 16  SpO2:  [93 %-98 %] 94 %  BP: (112-146)/() 126/61     Weight: 93 kg (205 lb)  Body mass index is 34.11 kg/m².    Physical Exam   Constitutional: She is oriented to person, place, and time. She appears well-developed and well-nourished.   HENT:   Head: Normocephalic and atraumatic.   Eyes: Conjunctivae and EOM are normal. Pupils are equal, round, and reactive to light.   Neck: Normal range of motion. Neck supple. No JVD present.   Cardiovascular: Normal rate, regular rhythm, normal heart sounds and intact distal pulses.   Pulmonary/Chest: Effort normal and breath sounds normal. No respiratory distress.   Abdominal: Soft. Bowel sounds are normal. There is tenderness (RUQ). There is no guarding.   Genitourinary:   Genitourinary Comments: deferred   Musculoskeletal: Normal range of motion. She exhibits tenderness (right scapula).   Neurological: She is alert and oriented to person, place, and time.   Skin: Skin is warm and dry. No rash noted.   Psychiatric: She has a normal mood and affect. Her behavior is normal. Judgment and thought content normal.   Nursing note and vitals reviewed.        CRANIAL NERVES     CN III, IV, VI   Pupils are equal, round, and reactive to light.  Extraocular motions are normal.        Significant Labs:   CBC:   Recent Labs   Lab 01/03/19  1100 01/05/19  0320   WBC 13.10* 16.80*   HGB 15.2 14.7   HCT 45.4 43.7   * 345     CMP:   Recent Labs   Lab 01/03/19  1100 01/05/19  0135    138   K 4.1  4.2    100   CO2 24 26   GLU 86 113*   BUN 8 11   CREATININE 0.7 0.8   CALCIUM 9.7 11.2*   PROT 7.2 7.0   ALBUMIN 4.0 4.0   BILITOT 0.5 0.4   ALKPHOS 65 63   AST 13 12   ALT 14 14   ANIONGAP 11 12   EGFRNONAA >60 >60     Lipase:   Recent Labs   Lab 01/05/19  0135   LIPASE 21     Urine Studies:   Recent Labs   Lab 01/05/19  0234   COLORU Yellow   APPEARANCEUA Clear   PHUR 6.0   SPECGRAV 1.010   PROTEINUA Negative   GLUCUA Negative   KETONESU Negative   BILIRUBINUA Negative   OCCULTUA Negative   NITRITE Negative   UROBILINOGEN Negative   LEUKOCYTESUR Negative       Significant Imaging: US abdomen ([facility]):   1. Mild hepatic steatosis.  2. Pancreas is partially obscured secondary to gas.    CT abd/pelvis ([facility]): 1. Ileus bowel gas pattern.  2. Negative for urinary tract stone/obstruction.  3. Right ovarian cyst, likely functional.    Assessment/Plan:     * Ileus    As Identified per CT abd/pelvis.  NPO  Initiate IVF  Ketorolac as needed for pain-avoid narcotic pain control  IV reglan q6hr  Ambulation  KUB in am       Smoker    Health hazards associated with cigarette smoking were reviewed with patient and cessation was encouraged. Nicotine replacement and counseling options were discussed.  Spent 3 minutes counseling on cessation, patient not ready to quit-- nicotine patch ordered.           VTE Risk Mitigation (From admission, onward)        Ordered     enoxaparin injection 40 mg  Daily      01/05/19 0553     IP VTE HIGH RISK PATIENT  Once      01/05/19 0553     Place SEBASTIEN hose  Until discontinued      01/05/19 0553     Place sequential compression device  Until discontinued      01/05/19 0553      Time spent seeing patient( greater than 1/2 spent in direct contact) : 50 minutes       EVELINA Polanco  Department of Hospital Medicine   [facility]

## 2019-01-05 NOTE — SUBJECTIVE & OBJECTIVE
Interval History: pt denies abd pain or nausea/vomiting. Only complaint is shoulder and rib pain    Review of Systems   Respiratory: Negative for shortness of breath.    Cardiovascular: Negative for chest pain.   Gastrointestinal: Negative for abdominal pain.   Genitourinary: Negative for dysuria.   Musculoskeletal: Positive for arthralgias.     Objective:     Vital Signs (Most Recent):  Temp: 97.5 °F (36.4 °C) (01/05/19 1259)  Pulse: 75 (01/05/19 1259)  Resp: 18 (01/05/19 1259)  BP: 122/62 (01/05/19 1259)  SpO2: (!) 94 % (01/05/19 1259) Vital Signs (24h Range):  Temp:  [96.4 °F (35.8 °C)-98.2 °F (36.8 °C)] 97.5 °F (36.4 °C)  Pulse:  [66-90] 75  Resp:  [16-18] 18  SpO2:  [93 %-98 %] 94 %  BP: (112-146)/() 122/62     Weight: 93 kg (205 lb)  Body mass index is 34.11 kg/m².  No intake or output data in the 24 hours ending 01/05/19 1318   Physical Exam   Constitutional: She is oriented to person, place, and time.   HENT:   Head: Normocephalic and atraumatic.   Neck: Neck supple. No tracheal deviation present.   Cardiovascular: Normal rate, regular rhythm and normal heart sounds.   Pulmonary/Chest: Effort normal. No respiratory distress.   Musculoskeletal: Normal range of motion. She exhibits no edema.   Neurological: She is oriented to person, place, and time.       Significant Labs: All pertinent labs within the past 24 hours have been reviewed.    Significant Imaging: I have reviewed all pertinent imaging results/findings within the past 24 hours.

## 2019-01-08 ENCOUNTER — TELEPHONE (OUTPATIENT)
Dept: FAMILY MEDICINE | Facility: CLINIC | Age: 54
End: 2019-01-08

## 2019-01-08 DIAGNOSIS — G43.719 INTRACTABLE CHRONIC MIGRAINE WITHOUT AURA AND WITHOUT STATUS MIGRAINOSUS: ICD-10-CM

## 2019-01-08 NOTE — TELEPHONE ENCOUNTER
Pt is refusing a hosp f/u on 1/17/19 stating she will end up back in the ER this evening.      ----- Message from Deanna Harvey sent at 1/8/2019  8:49 AM CST -----  Contact: self  Patient need to speak to nurse regarding a hospital f/u    Patient will like to be seen today 01/08      Patient states the problem she went in hospital for has not been resolved and pain is back      Epic earliest is 03/2019      Please call to advice [phone] (home)

## 2019-01-10 ENCOUNTER — TELEPHONE (OUTPATIENT)
Dept: PHARMACY | Facility: CLINIC | Age: 54
End: 2019-01-10

## 2019-01-14 NOTE — HOSPITAL COURSE
Patient was admitted after presenting to the ED with a complaint of right shoulder and rib pain for ileus of her bowel.  CT performed of her abdomen in the ER indicated the patient was having possible ileus.  Upon evaluation patient was having no complaints of ileus.  Patient denied any abdominal pain, nausea, or vomiting.  Patient does state that her right rib and shoulder pain had improved significantly after receiving a Toradol injection and was well controlled at this time.  Patient was tolerating a diet and feeling well.  Patient was discharged.

## 2019-01-14 NOTE — DISCHARGE SUMMARY
[facility] Hospital Medicine  Discharge Summary      Patient Name: Marsha Choi  MRN: [id number]  Admission Date: 1/5/2019  Hospital Length of Stay: 1 days  Discharge Date and Time: 1/5/2019  4:51 PM  Attending Physician: No att. providers found   Discharging Provider: Gavin John MD  Primary Care Provider: Lovely Garza MD      HPI:   Marsha Choi is a 54 y/o female with a PMHx of migraine headache who presented to the ED with c/o right shoulder pain which radiates into the right chest wall and right shoulder blade.  She was seen in the ED 2 days ago for the same issue and symptoms have progressed.  Work-up in the ED today identifies an ileus accompanied by leukocytosis.  Pt denies fever, shortness of breath, and dysuria.  Her last BM was 4 days ago and she has had some nausea but no vomiting.  She is admitted to the service of hospital medicine for further treatment.    * No surgery found *      Hospital Course:   Patient was admitted after presenting to the ED with a complaint of right shoulder and rib pain for ileus of her bowel.  CT performed of her abdomen in the ER indicated the patient was having possible ileus.  Upon evaluation patient was having no complaints of ileus.  Patient denied any abdominal pain, nausea, or vomiting.  Patient does state that her right rib and shoulder pain had improved significantly after receiving a Toradol injection and was well controlled at this time.  Patient was tolerating a diet and feeling well.  Patient was discharged.     Consults:     No new Assessment & Plan notes have been filed under this hospital service since the last note was generated.  Service: Hospital Medicine    Final Active Diagnoses:    Diagnosis Date Noted POA    PRINCIPAL PROBLEM:  Ileus [K56.7] 01/05/2019 Yes    Smoker [F17.200] 01/05/2019 Yes    Acute pain of right shoulder [M25.511] 01/05/2019 Yes    Right-sided chest wall pain [R07.89] 01/05/2019 Yes      Problems  Resolved During this Admission:       Discharged Condition: stable    Disposition: Home or Self Care    Follow Up:    Patient Instructions:      Diet Adult Regular     Activity as tolerated       Significant Diagnostic Studies:  None    Pending Diagnostic Studies:     None         Medications:  Reconciled Home Medications:      Medication List      CONTINUE taking these medications    butalbital-acetaminop-caf-cod -58-30 mg Cap  Take 1 capsule by mouth every 6 (six) hours as needed.     cetirizine 10 MG tablet  Commonly known as:  ZYRTEC  Take 10 mg by mouth once daily.     lamoTRIgine 25 MG tablet  Commonly known as:  LAMICTAL  Take 3 caps BID (75 mg TID)     phentermine 15 MG capsule     * sumatriptan 50 MG tablet  Commonly known as:  IMITREX  Take 1 at onset of headache, may repeat in 2 hours to a max of 3 per day, 2 days per week     * sumatriptan 50 MG tablet  Commonly known as:  IMITREX  TAKE 1 TABLET AT ONSET OF HEADACHE MAY REPEAT IN 2 HOURS TO A MAX OF 3 PER DAY 2 DAYS PER WEEK         * This list has 2 medication(s) that are the same as other medications prescribed for you. Read the directions carefully, and ask your doctor or other care provider to review them with you.            STOP taking these medications    AIMOVIG AUTOINJECTOR 70 mg/mL Atin  Generic drug:  erenumab-aooe     ibuprofen 200 MG tablet  Commonly known as:  ADVIL,MOTRIN        ASK your doctor about these medications    ibuprofen 800 MG tablet  Commonly known as:  ADVIL,MOTRIN  Take 1 tablet (800 mg total) by mouth every 6 (six) hours as needed for Pain.  Ask about: Should I take this medication?            Indwelling Lines/Drains at time of discharge:   Lines/Drains/Airways          None          Time spent on the discharge of patient: 23 minutes  Patient was seen and examined on the date of discharge and determined to be suitable for discharge.         Gavin John MD  Department of Hospital Medicine  [facility]

## 2019-02-08 ENCOUNTER — TELEPHONE (OUTPATIENT)
Dept: PHARMACY | Facility: CLINIC | Age: 54
End: 2019-02-08

## 2019-03-06 ENCOUNTER — TELEPHONE (OUTPATIENT)
Dept: PHARMACY | Facility: CLINIC | Age: 54
End: 2019-03-06

## 2019-03-29 ENCOUNTER — TELEPHONE (OUTPATIENT)
Dept: PHARMACY | Facility: CLINIC | Age: 54
End: 2019-03-29

## 2019-04-26 DIAGNOSIS — G43.719 INTRACTABLE CHRONIC MIGRAINE WITHOUT AURA AND WITHOUT STATUS MIGRAINOSUS: ICD-10-CM

## 2019-04-29 ENCOUNTER — TELEPHONE (OUTPATIENT)
Dept: PHARMACY | Facility: CLINIC | Age: 54
End: 2019-04-29

## 2019-05-02 ENCOUNTER — OFFICE VISIT (OUTPATIENT)
Dept: NEUROLOGY | Facility: CLINIC | Age: 54
End: 2019-05-02
Payer: COMMERCIAL

## 2019-05-02 VITALS
DIASTOLIC BLOOD PRESSURE: 77 MMHG | WEIGHT: 205 LBS | BODY MASS INDEX: 34.16 KG/M2 | HEIGHT: 65 IN | SYSTOLIC BLOOD PRESSURE: 127 MMHG | HEART RATE: 97 BPM | RESPIRATION RATE: 16 BRPM

## 2019-05-02 DIAGNOSIS — R41.89 COGNITIVE DEFICITS: ICD-10-CM

## 2019-05-02 DIAGNOSIS — G43.719 INTRACTABLE CHRONIC MIGRAINE WITHOUT AURA AND WITHOUT STATUS MIGRAINOSUS: ICD-10-CM

## 2019-05-02 DIAGNOSIS — G43.119 INTRACTABLE MIGRAINE WITH AURA WITHOUT STATUS MIGRAINOSUS: ICD-10-CM

## 2019-05-02 DIAGNOSIS — Z91.89 DRIVING SAFETY ISSUE: ICD-10-CM

## 2019-05-02 DIAGNOSIS — R26.89 IMBALANCE: Primary | ICD-10-CM

## 2019-05-02 DIAGNOSIS — R40.0 SOMNOLENCE: ICD-10-CM

## 2019-05-02 PROCEDURE — 3008F PR BODY MASS INDEX (BMI) DOCUMENTED: ICD-10-PCS | Mod: CPTII,S$GLB,, | Performed by: PSYCHIATRY & NEUROLOGY

## 2019-05-02 PROCEDURE — 99215 OFFICE O/P EST HI 40 MIN: CPT | Mod: S$GLB,,, | Performed by: PSYCHIATRY & NEUROLOGY

## 2019-05-02 PROCEDURE — 99999 PR PBB SHADOW E&M-EST. PATIENT-LVL III: ICD-10-PCS | Mod: PBBFAC,,, | Performed by: PSYCHIATRY & NEUROLOGY

## 2019-05-02 PROCEDURE — 3008F BODY MASS INDEX DOCD: CPT | Mod: CPTII,S$GLB,, | Performed by: PSYCHIATRY & NEUROLOGY

## 2019-05-02 PROCEDURE — 99215 PR OFFICE/OUTPT VISIT, EST, LEVL V, 40-54 MIN: ICD-10-PCS | Mod: S$GLB,,, | Performed by: PSYCHIATRY & NEUROLOGY

## 2019-05-02 PROCEDURE — 99999 PR PBB SHADOW E&M-EST. PATIENT-LVL III: CPT | Mod: PBBFAC,,, | Performed by: PSYCHIATRY & NEUROLOGY

## 2019-05-02 RX ORDER — SUMATRIPTAN 50 MG/1
TABLET, FILM COATED ORAL
Qty: 9 TABLET | Refills: 3 | Status: SHIPPED | OUTPATIENT
Start: 2019-05-02 | End: 2019-07-18

## 2019-05-02 RX ORDER — SUMATRIPTAN 50 MG/1
TABLET, FILM COATED ORAL
Qty: 9 TABLET | Refills: 3 | Status: SHIPPED | OUTPATIENT
Start: 2019-05-02 | End: 2019-12-13 | Stop reason: SDUPTHER

## 2019-05-02 NOTE — PROGRESS NOTES
Subjective:       Patient ID: Marsha Khalil is a 53 y.o. female.    Chief Complaint: Headaches   INTERVAL HISTORY 5/2/2019  The patient is a 54 y/o female presenting for follow up regarding migraine with and without aura. She has not been doing well. She initially responded to Aimovig but it quickly lost efficacy. She used 140 mg dose. She reports a variety of new symptoms which are unexplained including poor balance, falls, cognitive deficit, anger bursts at work. She is concerned because her sister was givena diagnosis of early stages of Alzheimer's disease, she is in the late 50's. Her mother had Alzheimer's as well, diagnosed in the early 60's. She is also very depressed. She cries during most of the interview.    INTERVAL HISTORY  The patient presents for follow up regarding migraine headache with and without aura. She was doing very well since placed on prevention with Lamictal, but for the last 4 weeks, she has had a constant, severe headache, not responding to Imitrex. Stress is a big trigger. Her daughter was just diagnosed with a pineal cyst and a small acoustic schwanoma. Otherwise information below is still accurate and current.    HPI  The patient is a pleasant 50 y/o female presenting with chief complaint of headache. They started after the birth of her last child 18 years ago. The location varies and migrates from one side to another, posterior or anterior or pancephalic. She has never taken preventive medication or migraine specific medications. She treats with tylenol or NSAIDs with partial relief. The duration varied from 3 to 4 hours to 3 to 4 days. She is more worried about episodes consisting of scintillating scotoma, described as a typical aura followed by a severe headache. She has had an ECHO in the past but not with bubble contrast. She has had a recent MRI of the brain which was negative, revealing microischemic changes as expected for her age. Her father suffered from MS. She has  undergone extensive work up for MS with negative results.  Please see details of headache characteristics below.  Headache questionnaire     1. When did your Headaches start?                         1999 with the birth of last child        2. Where are your headaches located?                        Both sides around the back of neck         3. Your headache's characteristics:                        Excruciating, Pressure, Throbbing, Pounding, Sharp        4. How long does the headache last?                        days        5. How often does the headache occur?                        0 varies         6. Are your headaches preceded or accompanied by other symptoms? yes                        If yes, please describe.  Blurry vision kaleidoscope affect , peripheral vision disappears         7. Does the headache awaken you at night? yes                        If so, how often? Once or twice per month            8. Please bev the word that best describes your headache's intensity:                         severe        9. Using a scale of 1 through 10, with 0 = no pain and 10 = the worst pain:                        What score is your headache now? 2                        What score is your headache at its worst? 10                        What score is your headache at its best? 2           10. Possible associated headache symptoms:  [x]  Sensitivity to light                                      [] Dizziness                                        [] Nasal or sinus pressure/ pain                        [x] Sensitivity to noise                                     [] Vertigo                                            [x] Problems with concentration  [] Sensitivity to smells             [] Ringing in ears                     [] Problems with memory                                            [x] Blurred vision                                 [x] Irritability                                         [x] Problems with task  completion   [] Double vision                                 [] Anger                                  [x]  Problems with relaxation  [] Loss of appetite                                         [] Anxiety                                           [x] Neck tightness, Neck pain  [x] Nausea                                                                 [] Nasal congestion  [] Vomiting                                                                       11. Headache improving factors:  [] Sleep                                  [x] Heat  [x] Darkness                                        [x] Ice  [] Local pressure                     [] Menses (period)  [x] Massage                                        [x] Medications:  advil and tylenol        12. Headache worsening factors:   [] Fatigue                     [] Sneezing                                        [x] Changes in Weather  [x] Light             [x] Bending Over           [x] Stress  [] Noise            [] Ovulation                                        [] Multiple Sclerosis Flare-Up  [] Smells                      [] Menses                                          [] Food   [x] Coughing                  [] Alcohol        13. Number of caffeinated drinks per day: 2        14. Number of diet drinks per day:  0     Review of Systems   Constitutional: Positive for fatigue. Negative for activity change, appetite change and fever.   HENT: Negative for congestion, dental problem, hearing loss, sinus pressure, tinnitus, trouble swallowing and voice change.    Eyes: Negative for photophobia, pain, redness and visual disturbance (visual aura).   Respiratory: Negative for cough, chest tightness and shortness of breath.    Cardiovascular: Negative for chest pain, palpitations and leg swelling.   Gastrointestinal: Positive for nausea. Negative for abdominal pain, blood in stool and vomiting.   Endocrine: Positive for cold intolerance. Negative for heat intolerance.    Genitourinary: Negative for difficulty urinating, frequency, menstrual problem and urgency.   Musculoskeletal: Negative for arthralgias, back pain, gait problem, joint swelling, myalgias, neck pain and neck stiffness.   Skin: Negative.    Neurological: Negative for dizziness, tremors, seizures, syncope, facial asymmetry, speech difficulty, weakness, light-headedness, numbness and headaches.   Hematological: Negative for adenopathy. Does not bruise/bleed easily.   Psychiatric/Behavioral: Negative for agitation, behavioral problems, confusion, decreased concentration, self-injury, sleep disturbance and suicidal ideas. The patient is not nervous/anxious and is not hyperactive.          Past Medical History:   Diagnosis Date    Allergy     Chest pain     negative stress test and heart cath    Headache     ALMA (obstructive sleep apnea)     Seasonal allergies      Past Surgical History:   Procedure Laterality Date    abscess removal from right side of neck      APPENDECTOMY      BREAST SURGERY Left     lumpectomy, benign    CARDIAC CATHETERIZATION      part of evaluation for chest pain, negative     SECTION      x1    ENDOMETRIAL ABLATION      TUBAL LIGATION       Family History   Problem Relation Age of Onset    Multiple sclerosis Father     Diabetes Father     Heart disease Father 50     CAD    Diabetes Mother     Cancer Mother      breast    Heart disease Mother 50     CAD    Thyroid disease Daughter      hypothyroidism    Transient ischemic attack Sister      Social History     Social History    Marital status: Significant Other     Spouse name: N/A    Number of children: N/A    Years of education: N/A     Occupational History    Not on file.     Social History Main Topics    Smoking status: Current Every Day Smoker     Packs/day: 1.00     Years: 30.00     Types: Cigarettes    Smokeless tobacco: Never Used    Alcohol use No    Drug use: No    Sexual activity: Yes     Partners:  Male     Other Topics Concern    Not on file     Social History Narrative    No narrative on file     Review of patient's allergies indicates:  No Known Allergies    Current Outpatient Prescriptions:     butalbital-aspirin-caffeine -40 mg (FIORINAL) -40 mg Cap, Take 1 capsule by mouth every 6 (six) hours as needed., Disp: 12 capsule, Rfl: 2    cetirizine (ZYRTEC) 10 MG tablet, Take 10 mg by mouth once daily., Disp: , Rfl:     ibuprofen (ADVIL,MOTRIN) 200 MG tablet, Take 600 mg by mouth 2 (two) times daily as needed for Pain., Disp: , Rfl:     lamotrigine (LAMICTAL) 25 MG tablet, Take 1 tablet daily for 1 week, then take 1 po BID for 1 week, then take 2 tablets BID for initial goal of 50 mg BID, Disp: 120 tablet, Rfl: 11    sumatriptan (IMITREX) 50 MG tablet, Take 1 at onset of headache, may repeat in 2 hours to a max of 3 per day, 2 days per week, Disp: 9 tablet, Rfl: 3      Objective:      Vitals:    05/02/19 1357   BP: 127/77   Pulse: 97   Resp: 16     Body mass index is 34.11 kg/m².    Physical Exam    Constitutional:   She appears well-developed and well-nourished. She is well groomed    HENT:    Head: Atraumatic, oral and nasal mucosa intact  Eyes: Conjunctivae and EOM are normal. Pupils are equal, round, and reactive to light OU  Neck: Neck supple. No thyromegaly present  Cardiovascular: Normal rate and normal heart sounds  No murmur heard  Pulmonary/Chest: Effort normal and breath sounds normal  Musculoskeletal: Normal range of motion. No joint stiffness. No vertebral point tenderness  Skin: Skin is warm and dry  Psychiatric: Normal mood and affect     Neuro exam:    Mental status: MOCA 27/30  Awake, attentive, Alert, oriented to self, place, year and month  Language function is intact    Cranial Nerves:  Smell was not formally evaluated  Cranial Nerves II - XII: intact  Pursuits were smooth, normal saccades, no nystagmus OU  Funduscopic exam - disc were flat and pink, no exudates or  hemorrhages OU  Motor - facial movement was symmetrical and normal     Palate moved well and was symmetrical with normal palatal and oral sensation  Tongue movements were full    Coordination:     Rapid alternating movements and rapid finger tapping - normal bilaterally  Finger to nose - normal and symmetric bilaterally   Heel to shin test - normal and symmetric bilaterally   Arm roll - smooth and symmetric   No intentional or positional tremor.     Motor:  Normal muscle bulk and symmetry. No fasciculations were noted    No pronator drift  Strength 5/5 bilaterally     Reflexes:  Tendon reflexes were 2 + at biceps, triceps, brachioradialis, patellar, and Achilles bilaterally  No clonus was noted     Sensory: Intact to light touch, pin prick in all extremities.     Gait: Romberg with positive swaying without fall. Gait with elements of astasia-abasia    Review of Data:    Results for orders placed or performed during the hospital encounter of 05/12/17   MRA Brain    Narrative    Technique:Noncontrast 3-D time-of-flight MRA head with review of axial source images and maximum intensity projection reconstructions      Comparison:None available at this institution     Findings:The quality of the study is good. The visualized cervical, petrous, cavernous and supraclinoid internal carotid arteries are unremarkable. The bilateral anterior cerebral and anterior communicating arteries are unremarkable. The bilateral middle cerebral arteries are patent and symmetric without evidence for stenosis.    The vertebral arteries are codominant. No vertebral artery stenosis or dissection is seen. The visualized inferior and superior cerebellar arteries are unremarkable. The basilar artery is unremarkable. The bilateral posterior cerebral arteries are unremarkable. There is a small, patent right posterior communicating artery. The left posterior communicating artery appears to be small or absent.    No intracranial aneurysm is  identified.    Impression    1. Normal MRA of the brain. Specifically, no identifiable intracranial aneurysm is seen. There is reportedly a previous diagnosis of a 1 mm aneurysm which would be difficult to reliably diagnose by MRA.    Epic notification system activated.      Electronically signed by: Elizabeth Thayer MD  Date:     05/12/17  Time:    16:58    Results for orders placed or performed during the hospital encounter of 05/12/17   MRI Brain W WO Contrast    Narrative    Comparison:None available at this institution    Technique: Sagittal T1, axial T1, T2, flair, T2 gradient echo and diffusion and 3 plane postcontrast T1-weighted sequences of the brain. 9 ml Gadavist was administered intravenously for the contrast sequences.    Findings:No reduced diffusion is identified to suggest acute ischemia. The pituitary gland and corpus callosum are unremarkable. No focal abnormal magnetic susceptibility is identified to suggest abnormal parenchymal blood products. No abnormal T1 shortening is identified. No extra-axial fluid collection, mass effect or midline shift is seen. Normal ventricular size is noted. No acute posterior fossa abnormality is seen. No IAC abnormality is seen. Orbital soft tissues are unremarkable. The paranasal sinuses and mastoid air cells appear clear. Slight leftward nasal septal deviation noted. There are a few, small, foci of round T2 and flair hyperintensity in the subcortical white matter of the bilateral frontal and parietal lobes, to a degree which is common in this age group. No focal abnormal contrast enhancement is identified. T2 arterial and dural venous sinus flow voids appear maintained which will be better evaluated on dedicated MRA from the same day. No calvarial or clival abnormality is seen.    Impression    1. No acute abnormality is seen.  2. Mild, nonspecific bilateral subcortical chronic white matter disease to a degree which is common in this age group. Findings commonly  reflects small vessel disease such as arteriolosclerosis.      Electronically signed by: Elizabeth Thayer MD  Date:     05/12/17  Time:    17:00      Lab Results   Component Value Date    BNP 10 01/03/2019     01/05/2019    K 4.0 01/05/2019    MG 1.7 01/05/2019     01/05/2019    CO2 22 (L) 01/05/2019    BUN 8 01/05/2019    CREATININE 0.6 01/05/2019    GLU 92 01/05/2019    HGBA1C 5.1 04/10/2018    AST 12 01/05/2019    ALT 13 01/05/2019    ALBUMIN 3.4 (L) 01/05/2019    PROT 6.1 01/05/2019    BILITOT 0.6 01/05/2019    CHOL 219 (H) 04/10/2018    HDL 42 04/10/2018    LDLCALC 144.2 04/10/2018    TRIG 164 (H) 04/10/2018       Lab Results   Component Value Date    WBC 13.00 (H) 01/05/2019    HGB 13.9 01/05/2019    HCT 41.4 01/05/2019    MCV 92 01/05/2019     01/05/2019       Lab Results   Component Value Date    TSH 0.812 04/10/2018               Assessment and Plan   Migraine with aura manifesting with scintillating scotoma.   New symptoms of imbalance, cognitive deficit, falls. Will obtain an MRI of the brain and refer for Neuropsychilogical evaluation to rule out pseudmentia.  Trial of Emgality, if no benefit after 3 months will introduce Botox  For the acute attack, sumatriptan as first line and limited Fiorinal for rescue.  Seasonal allergies  ALMA  I have discussed the side effects of the medications prescribed and the patient acknowledges understanding    Counseling:  More than 50% of the 40 minute encounter was spent face to face counseling the patient regarding current status and future plan of care as well as side of the medications. All questions were answered to patient's satisfaction    RTC in 3 months with headache diary        Florentin Fischer M.D  Medical Director, Headache and Facial Pain  [facility]

## 2019-05-24 ENCOUNTER — HOSPITAL ENCOUNTER (OUTPATIENT)
Dept: RADIOLOGY | Facility: HOSPITAL | Age: 54
Discharge: HOME | End: 2019-05-24
Attending: PSYCHIATRY & NEUROLOGY
Payer: COMMERCIAL

## 2019-05-24 DIAGNOSIS — R40.0 SOMNOLENCE: ICD-10-CM

## 2019-05-24 PROCEDURE — 25500020 PHARM REV CODE 255: Mod: PO | Performed by: PSYCHIATRY & NEUROLOGY

## 2019-05-24 PROCEDURE — 70553 MRI BRAIN W WO CONTRAST: ICD-10-PCS | Mod: 26,,, | Performed by: RADIOLOGY

## 2019-05-24 PROCEDURE — 70553 MRI BRAIN STEM W/O & W/DYE: CPT | Mod: TC,PO

## 2019-05-24 PROCEDURE — A9585 GADOBUTROL INJECTION: HCPCS | Mod: PO | Performed by: PSYCHIATRY & NEUROLOGY

## 2019-05-24 PROCEDURE — 70553 MRI BRAIN STEM W/O & W/DYE: CPT | Mod: 26,,, | Performed by: RADIOLOGY

## 2019-05-24 RX ORDER — GADOBUTROL 604.72 MG/ML
9 INJECTION INTRAVENOUS
Status: COMPLETED | OUTPATIENT
Start: 2019-05-24 | End: 2019-05-24

## 2019-05-24 RX ADMIN — GADOBUTROL 9 ML: 604.72 INJECTION INTRAVENOUS at 09:05

## 2019-05-31 ENCOUNTER — PATIENT MESSAGE (OUTPATIENT)
Dept: NEUROLOGY | Facility: CLINIC | Age: 54
End: 2019-05-31

## 2019-05-31 RX ORDER — BUTALBITAL, ACETAMINOPHEN, CAFFEINE AND CODEINE PHOSPHATE 300; 50; 40; 30 MG/1; MG/1; MG/1; MG/1
1 CAPSULE ORAL EVERY 6 HOURS PRN
Qty: 12 CAPSULE | Refills: 3 | Status: SHIPPED | OUTPATIENT
Start: 2019-05-31 | End: 2019-06-03 | Stop reason: SDUPTHER

## 2019-05-31 RX ORDER — GALCANEZUMAB 120 MG/ML
INJECTION, SOLUTION SUBCUTANEOUS
Qty: 2 ML | Refills: 0 | Status: SHIPPED | OUTPATIENT
Start: 2019-05-31 | End: 2019-07-17 | Stop reason: ALTCHOICE

## 2019-05-31 RX ORDER — DEXAMETHASONE 4 MG/1
TABLET ORAL
Qty: 9 TABLET | Refills: 0 | Status: SHIPPED | OUTPATIENT
Start: 2019-05-31 | End: 2019-07-18

## 2019-06-01 ENCOUNTER — PATIENT MESSAGE (OUTPATIENT)
Dept: NEUROLOGY | Facility: CLINIC | Age: 54
End: 2019-06-01

## 2019-06-03 ENCOUNTER — PATIENT MESSAGE (OUTPATIENT)
Dept: NEUROLOGY | Facility: CLINIC | Age: 54
End: 2019-06-03

## 2019-06-03 ENCOUNTER — TELEPHONE (OUTPATIENT)
Dept: PHARMACY | Facility: CLINIC | Age: 54
End: 2019-06-03

## 2019-06-03 NOTE — TELEPHONE ENCOUNTER
Informed Patient  that [facility] received prescription for Emgality and prior authorization is required.  [facility] will be back in touch once insurance determination is received.

## 2019-06-04 RX ORDER — BUTALBITAL, ACETAMINOPHEN, CAFFEINE AND CODEINE PHOSPHATE 300; 50; 40; 30 MG/1; MG/1; MG/1; MG/1
1 CAPSULE ORAL EVERY 6 HOURS PRN
Qty: 12 CAPSULE | Refills: 3 | Status: SHIPPED | OUTPATIENT
Start: 2019-06-04 | End: 2019-12-13 | Stop reason: SDUPTHER

## 2019-06-11 ENCOUNTER — TELEPHONE (OUTPATIENT)
Dept: NEUROLOGY | Facility: CLINIC | Age: 54
End: 2019-06-11

## 2019-06-11 NOTE — TELEPHONE ENCOUNTER
DOCUMENTATION ONLY:  Prior authorization for Emgality 120 mg/ml approved from 05/08/2019 to 12/04/2019.      Co-pay: $55- $0 with E-Voucher    Patient Assistance IS NOT required.     Forward to clinical pharmacist for consult & shipment. FLC

## 2019-06-24 ENCOUNTER — TELEPHONE (OUTPATIENT)
Dept: PHARMACY | Facility: CLINIC | Age: 54
End: 2019-06-24

## 2019-06-24 NOTE — TELEPHONE ENCOUNTER
Initial Emgality consult completed on 19 . Emgality 240mg (loading dose) will be shipped on 19 to arrive at patient's home on 19 via [employer]. $0.00 copay. Patient intends to start Emgality once received (19). Address confirmed. Confirmed 2 patient identifiers - name and . Therapy Appropriate.    Indication: Migraine prophlaxis  goals of treatment: reduction in migraine frequency, intensity, and/or duration.     --Injection experience: Yes  Informed patient on online injection video on  website - links sent via [employer] in refrigerator prior to use (do not freeze, do not shake, keep in original box until use).    Counseled patient on administration directions:  - Inject two injections (240mg) into the skin as a loading dose, followed by one injection (120mg) once every 4 weeks thereafter.   -  Advised patient to keep a calendar to stay compliant.   - Take out of the refrigerator 30 minutes prior to injection to reach room temperature.  - Wash hands before and after injection.  - Monthly RX will come with gauze, band aids, and alcohol swabs.  - Patient may self-inject in either the front/top of the thighs, abdomen- but at least 2 inches away from belly button   - If someone else is giving the injection, they may also use the outer part of your upper arm or your buttocks.   - If injecting 2 pens for the loading dose, use 2 different injection sites.   - Patient was instructed to rotate injections sites monthly.  - Inspect medication - should be clear and colorless to slightly yellow to slightly brown.   - Patient is to wipe down the injection site with the alcohol pad, wait to dry.    - Remove white base cap from pen (twist to remove).  - Place the pen flat against the injection site and turn the lock ring to the unlocked position then push down and hold the teal button - there will be an initial click; in 10 seconds you will hear a second click.  - Remove  the pen from skin and check to see if the gray plunger is visible - this will indicate that the injection is complete.   - Patient will use sharps container; once full, per state law, she/he may securely lock the sharps container and place in trash. Pharmacy will replace the sharps at no additional charge.    Patient was counseled on possible side effects:  - Injection site reaction: redness, soreness, itching, bruising, which should resolve within 3-5 days.  - Allergic reactions: itching, rash, hives, swelling of face, mouth, tongue, throat, trouble breathing.     Consultation included the importance of compliance and of keeping all follow up appointments.  Patient understands to report any medication changes to [facility] and provider. All questions answered and addressed to patients satisfaction. RPh will touchbase with pt in 7 days and [facility] will contact patient in 3 weeks for refills.    Indra Crane, PharmD  Clinical Pharmacist  [facility]  P: [phone]    InJennifer sent to provider on 6/24/19

## 2019-07-18 ENCOUNTER — OFFICE VISIT (OUTPATIENT)
Dept: NEUROLOGY | Facility: CLINIC | Age: 54
End: 2019-07-18
Payer: COMMERCIAL

## 2019-07-18 VITALS
HEIGHT: 65 IN | DIASTOLIC BLOOD PRESSURE: 68 MMHG | BODY MASS INDEX: 34.53 KG/M2 | RESPIRATION RATE: 13 BRPM | HEART RATE: 100 BPM | SYSTOLIC BLOOD PRESSURE: 107 MMHG | WEIGHT: 207.25 LBS

## 2019-07-18 DIAGNOSIS — R40.0 SOMNOLENCE: ICD-10-CM

## 2019-07-18 DIAGNOSIS — M54.81 BILATERAL OCCIPITAL NEURALGIA: ICD-10-CM

## 2019-07-18 DIAGNOSIS — G47.33 OSA (OBSTRUCTIVE SLEEP APNEA): ICD-10-CM

## 2019-07-18 DIAGNOSIS — R51.9 FACIAL PAIN: ICD-10-CM

## 2019-07-18 PROCEDURE — 99999 PR PBB SHADOW E&M-EST. PATIENT-LVL III: ICD-10-PCS | Mod: PBBFAC,,, | Performed by: PSYCHIATRY & NEUROLOGY

## 2019-07-18 PROCEDURE — 99214 OFFICE O/P EST MOD 30 MIN: CPT | Mod: S$GLB,,, | Performed by: PSYCHIATRY & NEUROLOGY

## 2019-07-18 PROCEDURE — 99214 PR OFFICE/OUTPT VISIT, EST, LEVL IV, 30-39 MIN: ICD-10-PCS | Mod: S$GLB,,, | Performed by: PSYCHIATRY & NEUROLOGY

## 2019-07-18 PROCEDURE — 3008F PR BODY MASS INDEX (BMI) DOCUMENTED: ICD-10-PCS | Mod: CPTII,S$GLB,, | Performed by: PSYCHIATRY & NEUROLOGY

## 2019-07-18 PROCEDURE — 99999 PR PBB SHADOW E&M-EST. PATIENT-LVL III: CPT | Mod: PBBFAC,,, | Performed by: PSYCHIATRY & NEUROLOGY

## 2019-07-18 PROCEDURE — 3008F BODY MASS INDEX DOCD: CPT | Mod: CPTII,S$GLB,, | Performed by: PSYCHIATRY & NEUROLOGY

## 2019-07-19 NOTE — PROGRESS NOTES
Subjective:       Patient ID: Marsha Khalil is a 53 y.o. female.    Chief Complaint: Headaches     INTERVAL HISTORY 7/18/2019  The patient presents for follow up. She has finally found relief with Emgality, she reports up to 14 days headache free which is unprecedented for her. She is averaging 2 headache attacks per month which are severe but short lasting and respond well to medication. She is very please with her current protocol.     INTERVAL HISTORY 5/2/2019  The patient is a 54 y/o female presenting for follow up regarding migraine with and without aura. She has not been doing well. She initially responded to Aimovig but it quickly lost efficacy. She used 140 mg dose. She reports a variety of new symptoms which are unexplained including poor balance, falls, cognitive deficit, anger bursts at work. She is concerned because her sister was givena diagnosis of early stages of Alzheimer's disease, she is in the late 50's. Her mother had Alzheimer's as well, diagnosed in the early 60's. She is also very depressed. She cries during most of the interview.    INTERVAL HISTORY  The patient presents for follow up regarding migraine headache with and without aura. She was doing very well since placed on prevention with Lamictal, but for the last 4 weeks, she has had a constant, severe headache, not responding to Imitrex. Stress is a big trigger. Her daughter was just diagnosed with a pineal cyst and a small acoustic schwanoma. Otherwise information below is still accurate and current.    HPI  The patient is a pleasant 52 y/o female presenting with chief complaint of headache. They started after the birth of her last child 18 years ago. The location varies and migrates from one side to another, posterior or anterior or pancephalic. She has never taken preventive medication or migraine specific medications. She treats with tylenol or NSAIDs with partial relief. The duration varied from 3 to 4 hours to 3 to 4 days. She is  more worried about episodes consisting of scintillating scotoma, described as a typical aura followed by a severe headache. She has had an ECHO in the past but not with bubble contrast. She has had a recent MRI of the brain which was negative, revealing microischemic changes as expected for her age. Her father suffered from MS. She has undergone extensive work up for MS with negative results.  Please see details of headache characteristics below.  Headache questionnaire     1. When did your Headaches start?                         1999 with the birth of last child        2. Where are your headaches located?                        Both sides around the back of neck         3. Your headache's characteristics:                        Excruciating, Pressure, Throbbing, Pounding, Sharp        4. How long does the headache last?                        days        5. How often does the headache occur?                        0 varies         6. Are your headaches preceded or accompanied by other symptoms? yes                        If yes, please describe.  Blurry vision kaleidoscope affect , peripheral vision disappears         7. Does the headache awaken you at night? yes                        If so, how often? Once or twice per month            8. Please bev the word that best describes your headache's intensity:                         severe        9. Using a scale of 1 through 10, with 0 = no pain and 10 = the worst pain:                        What score is your headache now? 2                        What score is your headache at its worst? 10                        What score is your headache at its best? 2           10. Possible associated headache symptoms:  [x]  Sensitivity to light                                      [] Dizziness                                        [] Nasal or sinus pressure/ pain                        [x] Sensitivity to noise                                     [] Vertigo                                             [x] Problems with concentration  [] Sensitivity to smells             [] Ringing in ears                     [] Problems with memory                                            [x] Blurred vision                                 [x] Irritability                                         [x] Problems with task completion   [] Double vision                                 [] Anger                                  [x]  Problems with relaxation  [] Loss of appetite                                         [] Anxiety                                           [x] Neck tightness, Neck pain  [x] Nausea                                                                 [] Nasal congestion  [] Vomiting                                                                       11. Headache improving factors:  [] Sleep                                  [x] Heat  [x] Darkness                                        [x] Ice  [] Local pressure                     [] Menses (period)  [x] Massage                                        [x] Medications:  advil and tylenol        12. Headache worsening factors:   [] Fatigue                     [] Sneezing                                        [x] Changes in Weather  [x] Light             [x] Bending Over           [x] Stress  [] Noise            [] Ovulation                                        [] Multiple Sclerosis Flare-Up  [] Smells                      [] Menses                                          [] Food   [x] Coughing                  [] Alcohol        13. Number of caffeinated drinks per day: 2        14. Number of diet drinks per day:  0     Review of Systems   Constitutional: Positive for fatigue. Negative for activity change, appetite change and fever.   HENT: Negative for congestion, dental problem, hearing loss, sinus pressure, tinnitus, trouble swallowing and voice change.    Eyes: Negative for photophobia, pain, redness and visual disturbance (visual  aura).   Respiratory: Negative for cough, chest tightness and shortness of breath.    Cardiovascular: Negative for chest pain, palpitations and leg swelling.   Gastrointestinal: Positive for nausea. Negative for abdominal pain, blood in stool and vomiting.   Endocrine: Positive for cold intolerance. Negative for heat intolerance.   Genitourinary: Negative for difficulty urinating, frequency, menstrual problem and urgency.   Musculoskeletal: Negative for arthralgias, back pain, gait problem, joint swelling, myalgias, neck pain and neck stiffness.   Skin: Negative.    Neurological: Negative for dizziness, tremors, seizures, syncope, facial asymmetry, speech difficulty, weakness, light-headedness, numbness and headaches.   Hematological: Negative for adenopathy. Does not bruise/bleed easily.   Psychiatric/Behavioral: Negative for agitation, behavioral problems, confusion, decreased concentration, self-injury, sleep disturbance and suicidal ideas. The patient is not nervous/anxious and is not hyperactive.          Past Medical History:   Diagnosis Date    Allergy     Chest pain     negative stress test and heart cath    Headache     ALMA (obstructive sleep apnea)     Seasonal allergies      Past Surgical History:   Procedure Laterality Date    abscess removal from right side of neck      APPENDECTOMY      BREAST SURGERY Left     lumpectomy, benign    CARDIAC CATHETERIZATION      part of evaluation for chest pain, negative     SECTION      x1    ENDOMETRIAL ABLATION      TUBAL LIGATION       Family History   Problem Relation Age of Onset    Multiple sclerosis Father     Diabetes Father     Heart disease Father 50     CAD    Diabetes Mother     Cancer Mother      breast    Heart disease Mother 50     CAD    Thyroid disease Daughter      hypothyroidism    Transient ischemic attack Sister      Social History     Social History    Marital status: Significant Other     Spouse name: N/A    Number  of children: N/A    Years of education: N/A     Occupational History    Not on file.     Social History Main Topics    Smoking status: Current Every Day Smoker     Packs/day: 1.00     Years: 30.00     Types: Cigarettes    Smokeless tobacco: Never Used    Alcohol use No    Drug use: No    Sexual activity: Yes     Partners: Male     Other Topics Concern    Not on file     Social History Narrative    No narrative on file     Review of patient's allergies indicates:  No Known Allergies    Current Outpatient Prescriptions:     butalbital-aspirin-caffeine -40 mg (FIORINAL) -40 mg Cap, Take 1 capsule by mouth every 6 (six) hours as needed., Disp: 12 capsule, Rfl: 2    cetirizine (ZYRTEC) 10 MG tablet, Take 10 mg by mouth once daily., Disp: , Rfl:     ibuprofen (ADVIL,MOTRIN) 200 MG tablet, Take 600 mg by mouth 2 (two) times daily as needed for Pain., Disp: , Rfl:     lamotrigine (LAMICTAL) 25 MG tablet, Take 1 tablet daily for 1 week, then take 1 po BID for 1 week, then take 2 tablets BID for initial goal of 50 mg BID, Disp: 120 tablet, Rfl: 11    sumatriptan (IMITREX) 50 MG tablet, Take 1 at onset of headache, may repeat in 2 hours to a max of 3 per day, 2 days per week, Disp: 9 tablet, Rfl: 3      Objective:      Vitals:    07/18/19 1401   BP: 107/68   Pulse: 100   Resp: 13     Body mass index is 34.49 kg/m².    Physical Exam    Constitutional:   She appears well-developed and well-nourished. She is well groomed    HENT:    Head: Atraumatic, oral and nasal mucosa intact  Eyes: Conjunctivae and EOM are normal. Pupils are equal, round, and reactive to light OU  Neck: Neck supple. No thyromegaly present  Cardiovascular: Normal rate and normal heart sounds  No murmur heard  Pulmonary/Chest: Effort normal and breath sounds normal  Musculoskeletal: Normal range of motion. No joint stiffness. No vertebral point tenderness  Skin: Skin is warm and dry  Psychiatric: Normal mood and affect     Neuro exam:     Mental status: MOCA 27/30  Awake, attentive, Alert, oriented to self, place, year and month  Language function is intact    Cranial Nerves:  Smell was not formally evaluated  Cranial Nerves II - XII: intact  Pursuits were smooth, normal saccades, no nystagmus OU  Funduscopic exam - disc were flat and pink, no exudates or hemorrhages OU  Motor - facial movement was symmetrical and normal     Palate moved well and was symmetrical with normal palatal and oral sensation  Tongue movements were full    Coordination:     Rapid alternating movements and rapid finger tapping - normal bilaterally  Finger to nose - normal and symmetric bilaterally   Heel to shin test - normal and symmetric bilaterally   Arm roll - smooth and symmetric   No intentional or positional tremor.     Motor:  Normal muscle bulk and symmetry. No fasciculations were noted    No pronator drift  Strength 5/5 bilaterally     Reflexes:  Tendon reflexes were 2 + at biceps, triceps, brachioradialis, patellar, and Achilles bilaterally  No clonus was noted     Sensory: Intact to light touch, pin prick in all extremities.     Gait: Romberg with positive swaying without fall. Gait with elements of astasia-abasia    Review of Data:    Results for orders placed or performed during the hospital encounter of 05/12/17   MRA Brain    Narrative    Technique:Noncontrast 3-D time-of-flight MRA head with review of axial source images and maximum intensity projection reconstructions      Comparison:None available at this institution     Findings:The quality of the study is good. The visualized cervical, petrous, cavernous and supraclinoid internal carotid arteries are unremarkable. The bilateral anterior cerebral and anterior communicating arteries are unremarkable. The bilateral middle cerebral arteries are patent and symmetric without evidence for stenosis.    The vertebral arteries are codominant. No vertebral artery stenosis or dissection is seen. The visualized  inferior and superior cerebellar arteries are unremarkable. The basilar artery is unremarkable. The bilateral posterior cerebral arteries are unremarkable. There is a small, patent right posterior communicating artery. The left posterior communicating artery appears to be small or absent.    No intracranial aneurysm is identified.    Impression    1. Normal MRA of the brain. Specifically, no identifiable intracranial aneurysm is seen. There is reportedly a previous diagnosis of a 1 mm aneurysm which would be difficult to reliably diagnose by MRA.    Epic notification system activated.      Electronically signed by: Elizabeth Thayer MD  Date:     05/12/17  Time:    16:58    Results for orders placed or performed during the hospital encounter of 05/12/17   MRI Brain W WO Contrast    Narrative    Comparison:None available at this institution    Technique: Sagittal T1, axial T1, T2, flair, T2 gradient echo and diffusion and 3 plane postcontrast T1-weighted sequences of the brain. 9 ml Gadavist was administered intravenously for the contrast sequences.    Findings:No reduced diffusion is identified to suggest acute ischemia. The pituitary gland and corpus callosum are unremarkable. No focal abnormal magnetic susceptibility is identified to suggest abnormal parenchymal blood products. No abnormal T1 shortening is identified. No extra-axial fluid collection, mass effect or midline shift is seen. Normal ventricular size is noted. No acute posterior fossa abnormality is seen. No IAC abnormality is seen. Orbital soft tissues are unremarkable. The paranasal sinuses and mastoid air cells appear clear. Slight leftward nasal septal deviation noted. There are a few, small, foci of round T2 and flair hyperintensity in the subcortical white matter of the bilateral frontal and parietal lobes, to a degree which is common in this age group. No focal abnormal contrast enhancement is identified. T2 arterial and dural venous sinus flow  voids appear maintained which will be better evaluated on dedicated MRA from the same day. No calvarial or clival abnormality is seen.    Impression    1. No acute abnormality is seen.  2. Mild, nonspecific bilateral subcortical chronic white matter disease to a degree which is common in this age group. Findings commonly reflects small vessel disease such as arteriolosclerosis.      Electronically signed by: Elizabeth Thayer MD  Date:     05/12/17  Time:    17:00      Lab Results   Component Value Date    BNP 10 01/03/2019     01/05/2019    K 4.0 01/05/2019    MG 1.7 01/05/2019     01/05/2019    CO2 22 (L) 01/05/2019    BUN 8 01/05/2019    CREATININE 0.6 01/05/2019    GLU 92 01/05/2019    HGBA1C 5.1 04/10/2018    AST 12 01/05/2019    ALT 13 01/05/2019    ALBUMIN 3.4 (L) 01/05/2019    PROT 6.1 01/05/2019    BILITOT 0.6 01/05/2019    CHOL 219 (H) 04/10/2018    HDL 42 04/10/2018    LDLCALC 144.2 04/10/2018    TRIG 164 (H) 04/10/2018       Lab Results   Component Value Date    WBC 13.00 (H) 01/05/2019    HGB 13.9 01/05/2019    HCT 41.4 01/05/2019    MCV 92 01/05/2019     01/05/2019       Lab Results   Component Value Date    TSH 0.812 04/10/2018       Results for orders placed or performed during the hospital encounter of 05/24/19   MRI Brain W WO Contrast    Narrative    EXAMINATION:  MRI BRAIN W WO CONTRAST    CLINICAL HISTORY:  imbalance; Somnolence    TECHNIQUE:  Multiplanar multisequence MR imaging of the brain was performed before and after the administration of 9 mL Gadavist intravenous contrast.    COMPARISON:  None    FINDINGS:  There is no restricted diffusion.  The craniocervical junction is within normal limits in appearance.  There is normal vascular flow void in major, central intracranial vessels.  Ventricles, sulci, fissures are unremarkable in appearance for the patient's age.  There are just very few small scattered foci of increased T2/FLAIR signal in the white matter consistent with  very mild microvascular ischemic change not appearing out of proportion for the patient's age and with no corresponding diffusion positivity or abnormal contrast enhancement.  No abnormal contrast enhancement or intracranial mass or mass effect is seen.  Orbital contents are unremarkable in appearance.  There is mild mucosal thickening in ethmoid sinuses.  Mastoids appear well pneumatized      Impression    Slight mucosal thickening ethmoid sinuses.  Very mild/minimal white matter changes suggesting minimal microvascular ischemic change.      Electronically signed by: Za Skinner MD  Date:    05/24/2019  Time:    10:43   Results for orders placed or performed during the hospital encounter of 05/12/17   MRA Brain    Narrative    Technique:Noncontrast 3-D time-of-flight MRA head with review of axial source images and maximum intensity projection reconstructions      Comparison:None available at this institution     Findings:The quality of the study is good. The visualized cervical, petrous, cavernous and supraclinoid internal carotid arteries are unremarkable. The bilateral anterior cerebral and anterior communicating arteries are unremarkable. The bilateral middle cerebral arteries are patent and symmetric without evidence for stenosis.    The vertebral arteries are codominant. No vertebral artery stenosis or dissection is seen. The visualized inferior and superior cerebellar arteries are unremarkable. The basilar artery is unremarkable. The bilateral posterior cerebral arteries are unremarkable. There is a small, patent right posterior communicating artery. The left posterior communicating artery appears to be small or absent.    No intracranial aneurysm is identified.    Impression    1. Normal MRA of the brain. Specifically, no identifiable intracranial aneurysm is seen. There is reportedly a previous diagnosis of a 1 mm aneurysm which would be difficult to reliably diagnose by MRA.    Epic notification system  activated.      Electronically signed by: Elizabeth Thayer MD  Date:     05/12/17  Time:    16:58              Assessment and Plan   Migraine with aura manifesting with scintillating scotoma.   New symptoms of imbalance, cognitive deficit, falls all warranted further investigation. An MRI of the brain last 5/24/2019 was negative for significant abnormality.     Continue Emgality, if it stops working for her, will introduce Botox  For the acute attack, sumatriptan as first line and limited Fiorinal for rescue.  Seasonal allergies  ALMA  I have discussed the side effects of the medications prescribed and the patient acknowledges understanding    Counseling:  More than 50% of the 25 minute encounter was spent face to face counseling the patient regarding current status and future plan of care as well as side of the medications. All questions were answered to patient's satisfaction    RTC in 3 months with headache diary        Florentin Fischer M.D  Medical Director, Headache and Facial Pain  [facility]

## 2019-08-09 ENCOUNTER — TELEPHONE (OUTPATIENT)
Dept: PHARMACY | Facility: CLINIC | Age: 54
End: 2019-08-09

## 2019-08-29 ENCOUNTER — OFFICE VISIT (OUTPATIENT)
Dept: BARIATRICS | Facility: CLINIC | Age: 54
End: 2019-08-29
Payer: COMMERCIAL

## 2019-08-29 VITALS
RESPIRATION RATE: 16 BRPM | HEIGHT: 65 IN | HEART RATE: 87 BPM | SYSTOLIC BLOOD PRESSURE: 146 MMHG | WEIGHT: 211 LBS | TEMPERATURE: 99 F | BODY MASS INDEX: 35.16 KG/M2 | DIASTOLIC BLOOD PRESSURE: 72 MMHG

## 2019-08-29 DIAGNOSIS — E66.09 CLASS 2 OBESITY DUE TO EXCESS CALORIES WITHOUT SERIOUS COMORBIDITY WITH BODY MASS INDEX (BMI) OF 35.0 TO 35.9 IN ADULT: ICD-10-CM

## 2019-08-29 DIAGNOSIS — E66.01 MORBID OBESITY: Primary | ICD-10-CM

## 2019-08-29 DIAGNOSIS — G47.33 OSA (OBSTRUCTIVE SLEEP APNEA): ICD-10-CM

## 2019-08-29 DIAGNOSIS — Z12.11 ENCOUNTER FOR SCREENING COLONOSCOPY: ICD-10-CM

## 2019-08-29 PROCEDURE — 3008F PR BODY MASS INDEX (BMI) DOCUMENTED: ICD-10-PCS | Mod: CPTII,S$GLB,, | Performed by: SURGERY

## 2019-08-29 PROCEDURE — 99999 PR PBB SHADOW E&M-EST. PATIENT-LVL V: ICD-10-PCS | Mod: PBBFAC,,, | Performed by: SURGERY

## 2019-08-29 PROCEDURE — 99999 PR PBB SHADOW E&M-EST. PATIENT-LVL V: CPT | Mod: PBBFAC,,, | Performed by: SURGERY

## 2019-08-29 PROCEDURE — 3008F BODY MASS INDEX DOCD: CPT | Mod: CPTII,S$GLB,, | Performed by: SURGERY

## 2019-08-29 PROCEDURE — 99214 PR OFFICE/OUTPT VISIT, EST, LEVL IV, 30-39 MIN: ICD-10-PCS | Mod: S$GLB,,, | Performed by: SURGERY

## 2019-08-29 PROCEDURE — 99214 OFFICE O/P EST MOD 30 MIN: CPT | Mod: S$GLB,,, | Performed by: SURGERY

## 2019-08-29 NOTE — PROGRESS NOTES
Initial Consult    Chief Complaint   Patient presents with    Obesity       History of Present Illness:  Patient is a 53 y.o. female who is referred for evaluation of surgical treatment of morbid obesity. Her Body mass index is 35.11 kg/m². She has known comorbidities of obstructive sleep apnea. She has not attended the bariatric seminar and is most interested in gastric sleeve surgery.      Past attempts at weight loss include: Unsupervised: atkins, calorie counting, gym membership, high protein, low carbo, slim fast, phentermine;  Supervised:  Medifast, weight watchers;  Diet pills: metabolife, phentermine;  Exercise attempts: walking or running, treadmill, swimming, elliptical     Weight history:   At current weight:  2 years  Obese for 5 years.  More than 35 pounds overweight for 7 years.  Started dieting at 23 years old.  Maximum weight reached: 219 pounds  Most weight lost was 45 pounds through fasting for 8 months.  She describes Her eating habits as volume eater, emotional eater.    ALMA screening: tested positive, doesn't wear mask    Reflux screening: none    Review of patient's allergies indicates:   Allergen Reactions    Topiramate      Marked drowsiness         Current Outpatient Medications   Medication Sig Dispense Refill    cetirizine (ZYRTEC) 10 MG tablet Take 10 mg by mouth once daily.      galcanezumab-gnlm (EMGALITY PEN) 120 mg/mL PnIj Inject 120 mg into the skin every 28 days. 1 mL 11    butalbital-acetaminop-caf-cod -38-30 mg Cap Take 1 capsule by mouth every 6 (six) hours as needed. 12 capsule 3    sumatriptan (IMITREX) 50 MG tablet TAKE 1 TABLET AT ONSET OF HEADACHE MAY REPEAT IN 2 HOURS TO A MAX OF 3 PER DAY 2 DAYS PER WEEK 9 tablet 3     No current facility-administered medications for this visit.        Past Medical History:   Diagnosis Date    Allergy     Chest pain     negative stress test and heart cath    Headache     Kidney stone     ALMA (obstructive sleep apnea)      Seasonal allergies      Past Surgical History:   Procedure Laterality Date    abscess removal from right side of neck      APPENDECTOMY      BREAST SURGERY Left     lumpectomy, benign    CARDIAC CATHETERIZATION      part of evaluation for chest pain, negative     SECTION      x1    ENDOMETRIAL ABLATION      TUBAL LIGATION      UPPER GASTROINTESTINAL ENDOSCOPY  prior to      Family History   Problem Relation Age of Onset    Multiple sclerosis Father     Diabetes Father     Heart disease Father 50        CAD    Diabetes Mother     Cancer Mother         breast    Heart disease Mother 50        CAD    Thyroid disease Daughter         hypothyroidism    Transient ischemic attack Sister     Colon cancer Neg Hx     Colon polyps Neg Hx     Crohn's disease Neg Hx     Esophageal cancer Neg Hx     Stomach cancer Neg Hx     Ulcerative colitis Neg Hx      Social History     Tobacco Use    Smoking status: Current Every Day Smoker     Packs/day: 0.75     Years: 30.00     Pack years: 22.50     Types: Cigarettes    Smokeless tobacco: Never Used   Substance Use Topics    Alcohol use: No    Drug use: No        Chart review:  See previous notes    Lab review:    Lab Results   Component Value Date    TSH 0.812 04/10/2018     Lab Results   Component Value Date    HGBA1C 5.1 04/10/2018     Lab Results   Component Value Date    CHOL 219 (H) 04/10/2018    CHOL 192 05/10/2017     Lab Results   Component Value Date    HDL 42 04/10/2018    HDL 36 (L) 05/10/2017     Lab Results   Component Value Date    LDLCALC 144.2 04/10/2018    LDLCALC 138.2 05/10/2017     Lab Results   Component Value Date    TRIG 164 (H) 04/10/2018    TRIG 89 05/10/2017     Lab Results   Component Value Date    CHOLHDL 19.2 (L) 04/10/2018    CHOLHDL 18.8 (L) 05/10/2017         Radiology review:    Ct abdomen pelvis: images independently reviewed: right sided diverticulae?; stool in colon on right     Review of Systems:  Review of  "Systems   Constitutional: Positive for fatigue and unexpected weight change. Negative for chills, diaphoresis and fever.   HENT: Negative for congestion, sinus pressure, sneezing, sore throat, tinnitus and voice change.    Eyes: Negative for pain, redness and itching.   Respiratory: Negative for apnea, cough, choking, chest tightness, shortness of breath, wheezing and stridor.    Cardiovascular: Negative for chest pain, palpitations and leg swelling.   Gastrointestinal: Negative for abdominal pain, anal bleeding, constipation, diarrhea, nausea and vomiting.   Endocrine: Negative for cold intolerance and heat intolerance.   Genitourinary: Negative for difficulty urinating and dysuria.   Musculoskeletal: Negative for arthralgias, back pain and gait problem.   Skin: Negative for rash and wound.   Allergic/Immunologic: Negative for environmental allergies and food allergies.   Neurological: Negative for dizziness, light-headedness and headaches.   Hematological: Negative for adenopathy. Does not bruise/bleed easily.   Psychiatric/Behavioral: Negative for agitation and confusion.       Physical:     Vital Signs (Most Recent)  Temp: 98.5 °F (36.9 °C) (08/29/19 1505)  Pulse: 87 (08/29/19 1505)  Resp: 16 (08/29/19 1505)  BP: (!) 146/72 (08/29/19 1505)  5' 5" (1.651 m)  95.7 kg (211 lb)     Body comp:  Fat Percent:  47.3 %  Fat Mass:  97.4 lb  FFM:  408.6 lb  TBW: 78 lb  TBW %:  37.9 %  BMR: 1538 kcal      Physical Exam:  Physical Exam   Constitutional: She is oriented to person, place, and time. She appears well-developed and well-nourished. No distress.   HENT:   Head: Normocephalic and atraumatic.   Mouth/Throat: No oropharyngeal exudate.   Eyes: Pupils are equal, round, and reactive to light. Conjunctivae and EOM are normal. No scleral icterus.   Neck: Normal range of motion. Neck supple. No JVD present. No tracheal deviation present. No thyromegaly present.   Cardiovascular: Normal rate, regular rhythm and normal heart " sounds. Exam reveals no gallop and no friction rub.   No murmur heard.  Pulmonary/Chest: Effort normal and breath sounds normal. No stridor. No respiratory distress. She has no wheezes. She has no rales. She exhibits no tenderness.   Abdominal: Soft. Bowel sounds are normal. She exhibits no distension and no mass. There is no tenderness. There is no rebound and no guarding.       Musculoskeletal: Normal range of motion. She exhibits no edema or tenderness.   Lymphadenopathy:     She has no cervical adenopathy.   Neurological: She is alert and oriented to person, place, and time. No cranial nerve deficit.   Skin: Skin is warm and dry. No rash noted. She is not diaphoretic. No erythema.   Psychiatric: She has a normal mood and affect. Her behavior is normal.   Nursing note and vitals reviewed.      ASSESSMENT/PLAN:        1. Morbid obesity  BMP    CBC w/ Auto Differential    Folate Serum    H. Pylori Antibody, IGG    Hg A1c    Hepatic Panel    Iron & TIBC    Lipid Profile    Magnesium    Phosphorus    T3    T4    TSH    Free T4    Vitamin B12    Vitamin B1    Vitamin D 25 Hydroxy    Ambulatory consult to Cardiology    Ambulatory consult to Nutrition Services    EKG 12-lead    Ambulatory consult to Psychology    FL Upper GI Without KUB   2. Class 2 obesity due to excess calories without serious comorbidity with body mass index (BMI) of 35.0 to 35.9 in adult     3. ALMA (obstructive sleep apnea)     4. Encounter for screening colonoscopy  Ambulatory Referral to Gastroenterology       Plan:  Marsha Choi has morbid obesity as their Body mass index is 35.11 kg/m². She would benefit from weight loss surgery and has chosen gastric sleeve surgery as the preferred procedure. She understands that this is a tool and lifestyle change will be necessary to keep weight off. I went over possible complications of all surgeries with the patient and she is agreeable to surgery.    She will need:    Labs  EKG  UGI   dietary  consult  psych consult   Seminar  Tobacco cessastion   colonscopy    I will obtain the following clearances prior to surgery: cardiology        Diet plan: high protein low carb- mainly meats and vegetables  Exercise plan: Cardiovascular exercise, get HR over 100 for 20 minutes 3 times per week.  Start multivitamin

## 2019-09-11 ENCOUNTER — TELEPHONE (OUTPATIENT)
Dept: PHARMACY | Facility: CLINIC | Age: 54
End: 2019-09-11

## 2019-09-19 ENCOUNTER — OFFICE VISIT (OUTPATIENT)
Dept: PSYCHIATRY | Facility: CLINIC | Age: 54
End: 2019-09-19
Payer: COMMERCIAL

## 2019-09-19 VITALS
RESPIRATION RATE: 18 BRPM | DIASTOLIC BLOOD PRESSURE: 85 MMHG | WEIGHT: 215.63 LBS | BODY MASS INDEX: 35.93 KG/M2 | HEIGHT: 65 IN | HEART RATE: 89 BPM | SYSTOLIC BLOOD PRESSURE: 144 MMHG

## 2019-09-19 DIAGNOSIS — Z01.818 PREOPERATIVE CLEARANCE: Primary | ICD-10-CM

## 2019-09-19 PROCEDURE — 90792 PR PSYCHIATRIC DIAGNOSTIC EVALUATION W/MEDICAL SERVICES: ICD-10-PCS | Mod: S$GLB,,, | Performed by: PSYCHOLOGIST

## 2019-09-19 PROCEDURE — 99999 PR PBB SHADOW E&M-EST. PATIENT-LVL III: ICD-10-PCS | Mod: PBBFAC,,, | Performed by: PSYCHOLOGIST

## 2019-09-19 PROCEDURE — 90792 PSYCH DIAG EVAL W/MED SRVCS: CPT | Mod: S$GLB,,, | Performed by: PSYCHOLOGIST

## 2019-09-19 PROCEDURE — 99999 PR PBB SHADOW E&M-EST. PATIENT-LVL III: CPT | Mod: PBBFAC,,, | Performed by: PSYCHOLOGIST

## 2019-09-19 NOTE — PROGRESS NOTES
Client:  Marsha Choi  :  1965  Erma Garcia MD  [id number]    Presenting complaint:/Past psychiatric treatment:  Marsha is a 54yo cau female who was seen for evaluation and clearance for bariatric surgery.  Marsha reported she has always had problems maintaining her weight and that she has not been successful losing sufficient weight in the past or being able to maintain weight loss she has achieved at times.  By her report her lowest adult weight has been 117 lbs at age 23.  Her maximum adults weight has been 219 lbs with her current weight 215 lbs.  Marsha states her goal for having the surgery is to lose to a healthy weight that will improve her obstructive sleep apnea, allow her to engage in physical activity other normal weight individuals engage in easily, reduce her pain complaints,  and improve her overall physical stamina and wellbeing.     Marsha had no history of past or present emotional problems.  Her PHQ9 score today was 3 and her GAD7 score was 2. Both are in the normal range.  She denied SI/HI/delusions/hallucinations/mood swings and expansive moods. She was oriented times 4, sequenced tasks well, copied accurately and was able to do simple math problems.  There was no history of any cognitive issues or subjective memory complaints.  Marsha also had no reported chronic stressors and there was no history of any family members having psychiatric diagnoses or problems.    Marsha has a good understanding of the risk, benefits, and alternative of bariatric surgery.  She has started to reduce portions and has started to eliminate comfort foods.  She is aware of the risk of failure if she is not compliant with the treatment plan and post operative dietary restrictions.  Marsha states her spouse is supporting her and that she has other family members that she can ask for help if she needs any.  Marsha also reprted her daughter will be here during and after the surgery.    Relevant lab  reviewed  No recent labs were posted in the file but they have been ordered  Relevant EKG reviewed  1/19 QTc 420 NSR  No history of cardiac symptoms    Review of patient's allergies indicates:   Allergen Reactions    Topiramate      Marked drowsiness         Current Outpatient Medications:     butalbital-acetaminop-caf-cod -09-30 mg Cap, Take 1 capsule by mouth every 6 (six) hours as needed., Disp: 12 capsule, Rfl: 3    cetirizine (ZYRTEC) 10 MG tablet, Take 10 mg by mouth once daily., Disp: , Rfl:     galcanezumab-gnlm (EMGALITY PEN) 120 mg/mL PnIj, Inject 120 mg into the skin every 28 days., Disp: 1 mL, Rfl: 11    sumatriptan (IMITREX) 50 MG tablet, TAKE 1 TABLET AT ONSET OF HEADACHE MAY REPEAT IN 2 HOURS TO A MAX OF 3 PER DAY 2 DAYS PER WEEK, Disp: 9 tablet, Rfl: 3  Past Medical History:   Diagnosis Date    Allergy     Chest pain     negative stress test and heart cath    Headache     Kidney stone     ALMA (obstructive sleep apnea)     Seasonal allergies        Clinical presentation:  Appearance:  The client presented in casual attire evidencing good grooming and hygiene    Neuro:  the client was alert, oriented x 4 and evidenced good judgement and insight.      Attention/concentration:  Was good in session    Memory:  The client had no subjective memory complaints and they were able to recall information discussed in session accurately    Judgement:  behavior is adequate to the situation    Fund of knowledge:  intact and appropriate to age and level of education    Gait/station:  was normal    Heart: the patients heartbeat was regular in rate and rhythm.  There were no murmurs, gallops or rubs.    Lung: clear bilaterally    Skin/Extremities:  warm and dry, no rashes/lesions/bruises or edema noted. Nailbeds were pink and refill was good    Mood:  was mildly dysthymic.  No SI/HI/delusions/hallucinations/mood swings or expansive mood periods reported or suspected.     Affect: was normal  range    Speech:  normal rate and tone     Sleep:  the client had no reported history of sleep problems.  She did report a history of sleep apnea but has not been on CPAP and states she has not had a current sleep study.    Appetite: was reported as good.  She has a history of eating comfort foods and is aware that this is something she has to stop in order for surgery to be successful.    Pain complaints:  none were voiced    ETOH/Substance use history:  The client had no reported ETOH/or other substance use problems     Psychosocial history:  The client currently lives with her , described their relationship as very positive.  They moved from [place] in 2017 and have not developed many peer relationships. Marsha is active with [employer] for her home and with gardening.     Education/session discussion:  · Reviewed basic risks, benefits, alternatives to bariatric surgery  · Discussed potential body image changes and the emotional impact these changes can have. Advised her that if she has difficulty coping with the body images to seek help    Impression:  Marsha has a good understanding of the bariatric procedure and is motivated to follow pre and post operative treatment protocol to succeed.  She has no emotional or cognitive contraindications and is cleared psychologically for the surgery.    Session:  [id number]

## 2019-09-23 ENCOUNTER — CLINICAL SUPPORT (OUTPATIENT)
Dept: BARIATRICS | Facility: CLINIC | Age: 54
End: 2019-09-23
Payer: COMMERCIAL

## 2019-09-23 VITALS — HEIGHT: 65 IN | BODY MASS INDEX: 35.63 KG/M2 | WEIGHT: 213.88 LBS

## 2019-09-23 DIAGNOSIS — E55.9 HYPOVITAMINOSIS D: ICD-10-CM

## 2019-09-23 DIAGNOSIS — E66.9 OBESITY (BMI 30.0-34.9): ICD-10-CM

## 2019-09-23 DIAGNOSIS — Z71.3 DIETARY COUNSELING: ICD-10-CM

## 2019-09-23 PROCEDURE — 99999 PR PBB SHADOW E&M-EST. PATIENT-LVL II: ICD-10-PCS | Mod: PBBFAC,,, | Performed by: DIETITIAN, REGISTERED

## 2019-09-23 PROCEDURE — 99999 PR PBB SHADOW E&M-EST. PATIENT-LVL II: CPT | Mod: PBBFAC,,, | Performed by: DIETITIAN, REGISTERED

## 2019-09-23 PROCEDURE — 97802 MEDICAL NUTRITION INDIV IN: CPT | Mod: S$GLB,,, | Performed by: DIETITIAN, REGISTERED

## 2019-09-23 PROCEDURE — 97802 PR MED NUTR THER, 1ST, INDIV, EA 15 MIN: ICD-10-PCS | Mod: S$GLB,,, | Performed by: DIETITIAN, REGISTERED

## 2019-09-23 NOTE — PROGRESS NOTES
"NUTRITIONAL CONSULT    Referring Physician: Dr. Garcia   Reason for MNT Referral: Initial assessment for sleeve gastrectomy work-up    PAST MEDICAL HISTORY:   53 y.o. female  Body mass index is 35.59 kg/m²..    Past attempts at weight loss include: Unsupervised: atkins, calorie counting, gym membership, high protein, low carbo, slim fast, phentermine;  Supervised:  Medifast, weight watchers;  Diet pills: metabolife, phentermine;  Exercise attempts: walking or running, treadmill, swimming, elliptical      Weight history:   At current weight:  2 years  Obese for 5 years.  More than 35 pounds overweight for 7 years.  Started dieting at 23 years old.  Maximum weight reached: 219 pounds  Most weight lost was 45 pounds through fasting for 8 months.      Past Medical History:   Diagnosis Date    Allergy     Chest pain     negative stress test and heart cath    Headache     Kidney stone     ALMA (obstructive sleep apnea)     Seasonal allergies        CLINICAL DATA:  53 y.o.-year-old White female.  Height: 5'5"  Weight: 213 lbs   IBW: 125 lbs +/- 10 %  BMI: 35.59  The patient's goal weight (50% EBW): 169 lbs  Personal goal weight:  160 lbs    Goal for Bariatric Surgery: to improve health, to improve quality of life, to lose weight and to prevent future medical conditions    NUTRITIONAL NEEDS:  1550 Calories (using BMR )  60-80 Grams Protein per ASMBS guidelines     NUTRITION & HEALTH HISTORY:  Greatest challenge: dining out frequency, starchy CHO, irregular meal patterns and high-fat diet    Current diet recall:  Food logs available - Breakfast: 1 hard boiled egg ( 4 hours after waking) ; Lunch:  Premier shakes; tilapia broccoli, sometimes  Potatoes; snack- grapes     Current Diet:  Meal pattern: 3   Protein supplements:  Daily as meal replacement   Snackin-1  / day  Vegetables: Likes a variety. Eats daily.  Fruits: Likes a variety. Eats daily.  Beverages: water and coffee without sugar  Dining out: reduced latley " once a month  Mostly take-out.  Cooking at home: Daily. Mostly baked meat, fish and vegetables.    Exercise:  Past exercise: Fair    Current exercise: climbs stairs at work, walks dog  few times/ week   Restrictions to exercise: none    Vitamins / Minerals / Herbs:   MVI- no interactions       Labs:    no recent, none available at this time    Food Allergies:   None     Social:  Works regular daytime shifts.  Lives with  .  Grocery shopping and food prep  Self .  Patient believes the household will be supportive after surgery.  Alcohol: None.  Smoking: quit per patient 3 weeks ago    ASSESSMENT:  · Patient reports attempts at weight loss, only to regain lost weight.  · Patient demonstrated knowledge of healthy eating behaviors and exercise patterns; admits to not eating healthy and not exercising at this point.  · Patient states willingness and demonstrates willingness to change lifestyle and make behavior modifications as evidenced by good exercise, daily food logs, dietary changes, including protein drinks and increased vegetables.    Insurance requires medically supervised diet prior to consideration for bariatric surgery.    Barriers to Education: none    Stage of change: action    NUTRITION DIAGNOSIS:    Obesity related to Food and nutrition related knowledge deficit and Excessive carbohydrate intake as evidence by BMI.    BARIATRIC DIET DISCUSSION/PLAN:  Discussed diet after surgery and related to patient's food record.  Reviewed nutrition guidelines for before and after surgery.  Answered all questions.  Resume work-up for surgery.  Continue to review Bariatric Nutrition Guidebook at home and call with any questions.  Work on Bariatric Nutrition Checklist.  Work on expanding variety of vegetables.  Work on gradually cutting back on starchy CHO in the diet.  Start shopping for bariatric vitamins & minerals.  Return to clinic.  high protein meals and snacks, eat breakfast within 2 hours of waking  up    RECOMMENDATIONS:  Patient is  A potential  candidate for bariatric surgery.    Needs additional visit(s) with MD.    Patient verbalized understanding.    Expect fair  compliance after surgery at this time.    Communicated nutrition plan with bariatric team.    SESSION TIME:  60 minutes

## 2019-09-24 ENCOUNTER — OFFICE VISIT (OUTPATIENT)
Dept: BARIATRICS | Facility: CLINIC | Age: 54
End: 2019-09-24
Payer: COMMERCIAL

## 2019-09-24 VITALS — DIASTOLIC BLOOD PRESSURE: 73 MMHG | HEART RATE: 78 BPM | SYSTOLIC BLOOD PRESSURE: 134 MMHG

## 2019-09-24 DIAGNOSIS — E66.01 CLASS 2 SEVERE OBESITY DUE TO EXCESS CALORIES WITH SERIOUS COMORBIDITY AND BODY MASS INDEX (BMI) OF 35.0 TO 35.9 IN ADULT: ICD-10-CM

## 2019-09-24 DIAGNOSIS — E66.01 MORBID OBESITY: Primary | ICD-10-CM

## 2019-09-24 DIAGNOSIS — G47.33 OSA (OBSTRUCTIVE SLEEP APNEA): ICD-10-CM

## 2019-09-24 PROCEDURE — 99213 OFFICE O/P EST LOW 20 MIN: CPT | Mod: S$GLB,,, | Performed by: SURGERY

## 2019-09-24 PROCEDURE — 99999 PR PBB SHADOW E&M-EST. PATIENT-LVL II: ICD-10-PCS | Mod: PBBFAC,,, | Performed by: SURGERY

## 2019-09-24 PROCEDURE — 99213 PR OFFICE/OUTPT VISIT, EST, LEVL III, 20-29 MIN: ICD-10-PCS | Mod: S$GLB,,, | Performed by: SURGERY

## 2019-09-24 PROCEDURE — 99999 PR PBB SHADOW E&M-EST. PATIENT-LVL II: CPT | Mod: PBBFAC,,, | Performed by: SURGERY

## 2019-09-24 NOTE — PROGRESS NOTES
Medically Supervised Weight Loss Documentation    Date of Visit: 09/24/2019    Patient: Marsha Choi    Current Weight: 213  Current BMI: There is no height or weight on file to calculate BMI.  Weight Change:  +2    Last Weight: 211    Beginning Weight: 211      Vitals:   Vitals:    09/24/19 1036   BP: 134/73   Pulse: 78       Comorbidities:   Past Medical History:   Diagnosis Date    Allergy     Chest pain     negative stress test and heart cath    Headache     Kidney stone     ALMA (obstructive sleep apnea)     Seasonal allergies        Medications:  Current Outpatient Medications on File Prior to Visit   Medication Sig Dispense Refill    butalbital-acetaminop-caf-cod -69-30 mg Cap Take 1 capsule by mouth every 6 (six) hours as needed. 12 capsule 3    cetirizine (ZYRTEC) 10 MG tablet Take 10 mg by mouth once daily.      galcanezumab-gnlm (EMGALITY PEN) 120 mg/mL PnIj Inject 120 mg into the skin every 28 days. 1 mL 11    sumatriptan (IMITREX) 50 MG tablet TAKE 1 TABLET AT ONSET OF HEADACHE MAY REPEAT IN 2 HOURS TO A MAX OF 3 PER DAY 2 DAYS PER WEEK 9 tablet 3     No current facility-administered medications on file prior to visit.          Body comp:  Fat Percent:  49.3 %  Fat Mass:  106.4 lb  FFM:  109.6 lb  TBW: 76.25 lb  TBW %:  36.7 %  BMR: 1566 kcal      Diet Education Discussed:    Diet journal reviewed by dietitian: following low carb dieting    Exercise/Activity Discussed:    Walking more- stairs    Behavior or Diet Goals for this patient:    Start limiting portion sizes  Develop exercise routing 20 minutes 3 times per week     Labs  EKG  UGI   dietary consult  psych consult   Seminar  Tobacco cessastion - stopped august 31  colonscopy     I will obtain the following clearances prior to surgery: cardiology    : I met with the patient for 15 minutes and counseled her for over 50% of that time

## 2019-09-30 ENCOUNTER — TELEPHONE (OUTPATIENT)
Dept: BARIATRICS | Facility: CLINIC | Age: 54
End: 2019-09-30

## 2019-09-30 NOTE — TELEPHONE ENCOUNTER
----- Message from Hilda Fuentes sent at 9/30/2019  4:41 PM CDT -----  Contact: Patient  Type: Needs Medical Advice    Who Called:  Patient  Best Call Back Number: [phone]  Additional Information: Patient is calling to get scheduled for her colonoscopy.Please call back and advise.

## 2019-10-07 DIAGNOSIS — Z12.11 SCREENING FOR COLON CANCER: Primary | ICD-10-CM

## 2019-10-10 ENCOUNTER — TELEPHONE (OUTPATIENT)
Dept: PHARMACY | Facility: CLINIC | Age: 54
End: 2019-10-10

## 2019-10-14 ENCOUNTER — HOSPITAL ENCOUNTER (OUTPATIENT)
Dept: RADIOLOGY | Facility: HOSPITAL | Age: 54
Discharge: HOME | End: 2019-10-14
Attending: SURGERY
Payer: COMMERCIAL

## 2019-10-14 DIAGNOSIS — E66.01 MORBID OBESITY: ICD-10-CM

## 2019-10-14 PROCEDURE — 74240 FL UPPER GI WITHOUT KUB: ICD-10-PCS | Mod: 26,,, | Performed by: RADIOLOGY

## 2019-10-14 PROCEDURE — 74240 X-RAY XM UPR GI TRC 1CNTRST: CPT | Mod: TC,FY

## 2019-10-14 PROCEDURE — 74240 X-RAY XM UPR GI TRC 1CNTRST: CPT | Mod: 26,,, | Performed by: RADIOLOGY

## 2019-10-21 ENCOUNTER — ANESTHESIA (OUTPATIENT)
Dept: ENDOSCOPY | Facility: HOSPITAL | Age: 54
End: 2019-10-21
Payer: COMMERCIAL

## 2019-10-21 ENCOUNTER — ANESTHESIA EVENT (OUTPATIENT)
Dept: ENDOSCOPY | Facility: HOSPITAL | Age: 54
End: 2019-10-21
Payer: COMMERCIAL

## 2019-10-21 ENCOUNTER — HOSPITAL ENCOUNTER (OUTPATIENT)
Facility: HOSPITAL | Age: 54
Discharge: HOME | End: 2019-10-21
Attending: INTERNAL MEDICINE | Admitting: INTERNAL MEDICINE
Payer: COMMERCIAL

## 2019-10-21 VITALS
HEART RATE: 78 BPM | HEIGHT: 65 IN | WEIGHT: 213 LBS | TEMPERATURE: 98 F | BODY MASS INDEX: 35.49 KG/M2 | DIASTOLIC BLOOD PRESSURE: 83 MMHG | OXYGEN SATURATION: 94 % | RESPIRATION RATE: 16 BRPM | SYSTOLIC BLOOD PRESSURE: 126 MMHG

## 2019-10-21 DIAGNOSIS — Z12.11 SCREEN FOR COLON CANCER: ICD-10-CM

## 2019-10-21 DIAGNOSIS — K64.8 INTERNAL HEMORRHOIDS: ICD-10-CM

## 2019-10-21 DIAGNOSIS — K63.5 POLYP OF COLON, UNSPECIFIED PART OF COLON, UNSPECIFIED TYPE: Primary | ICD-10-CM

## 2019-10-21 PROCEDURE — 45385 PR COLONOSCOPY,REMV LESN,SNARE: ICD-10-PCS | Mod: 33,,, | Performed by: INTERNAL MEDICINE

## 2019-10-21 PROCEDURE — 88305 TISSUE EXAM BY PATHOLOGIST: CPT | Performed by: PATHOLOGY

## 2019-10-21 PROCEDURE — 27201089 HC SNARE, DISP (ANY): Performed by: INTERNAL MEDICINE

## 2019-10-21 PROCEDURE — 63600175 PHARM REV CODE 636 W HCPCS: Performed by: INTERNAL MEDICINE

## 2019-10-21 PROCEDURE — 88305 TISSUE EXAM BY PATHOLOGIST: CPT | Mod: 26,,, | Performed by: PATHOLOGY

## 2019-10-21 PROCEDURE — 63600175 PHARM REV CODE 636 W HCPCS: Performed by: NURSE ANESTHETIST, CERTIFIED REGISTERED

## 2019-10-21 PROCEDURE — D9220A PRA ANESTHESIA: Mod: 33,ANES,, | Performed by: ANESTHESIOLOGY

## 2019-10-21 PROCEDURE — 45380 COLONOSCOPY AND BIOPSY: CPT | Performed by: INTERNAL MEDICINE

## 2019-10-21 PROCEDURE — 37000008 HC ANESTHESIA 1ST 15 MINUTES: Performed by: INTERNAL MEDICINE

## 2019-10-21 PROCEDURE — 27201012 HC FORCEPS, HOT/COLD, DISP: Performed by: INTERNAL MEDICINE

## 2019-10-21 PROCEDURE — D9220A PRA ANESTHESIA: Mod: 33,CRNA,, | Performed by: NURSE ANESTHETIST, CERTIFIED REGISTERED

## 2019-10-21 PROCEDURE — 45385 COLONOSCOPY W/LESION REMOVAL: CPT | Performed by: INTERNAL MEDICINE

## 2019-10-21 PROCEDURE — 45381 PR COLONOSCPY,FLEX,W/DIR SUBMUC INJECT: ICD-10-PCS | Mod: 51,,, | Performed by: INTERNAL MEDICINE

## 2019-10-21 PROCEDURE — D9220A PRA ANESTHESIA: ICD-10-PCS | Mod: 33,ANES,, | Performed by: ANESTHESIOLOGY

## 2019-10-21 PROCEDURE — D9220A PRA ANESTHESIA: ICD-10-PCS | Mod: 33,CRNA,, | Performed by: NURSE ANESTHETIST, CERTIFIED REGISTERED

## 2019-10-21 PROCEDURE — 37000009 HC ANESTHESIA EA ADD 15 MINS: Performed by: INTERNAL MEDICINE

## 2019-10-21 PROCEDURE — 45385 COLONOSCOPY W/LESION REMOVAL: CPT | Mod: 33,,, | Performed by: INTERNAL MEDICINE

## 2019-10-21 PROCEDURE — 45380 COLONOSCOPY AND BIOPSY: CPT | Mod: 59,,, | Performed by: INTERNAL MEDICINE

## 2019-10-21 PROCEDURE — 27201028 HC NEEDLE, SCLERO: Performed by: INTERNAL MEDICINE

## 2019-10-21 PROCEDURE — 45380 PR COLONOSCOPY,BIOPSY: ICD-10-PCS | Mod: 59,,, | Performed by: INTERNAL MEDICINE

## 2019-10-21 PROCEDURE — 45381 COLONOSCOPY SUBMUCOUS NJX: CPT | Performed by: INTERNAL MEDICINE

## 2019-10-21 PROCEDURE — 45381 COLONOSCOPY SUBMUCOUS NJX: CPT | Mod: 51,,, | Performed by: INTERNAL MEDICINE

## 2019-10-21 PROCEDURE — 88305 TISSUE SPECIMEN TO PATHOLOGY - SURGERY: ICD-10-PCS | Mod: 26,,, | Performed by: PATHOLOGY

## 2019-10-21 RX ORDER — SODIUM CHLORIDE 9 MG/ML
INJECTION, SOLUTION INTRAVENOUS CONTINUOUS
Status: DISCONTINUED | OUTPATIENT
Start: 2019-10-21 | End: 2019-10-21

## 2019-10-21 RX ORDER — PROPOFOL 10 MG/ML
VIAL (ML) INTRAVENOUS
Status: DISCONTINUED | OUTPATIENT
Start: 2019-10-21 | End: 2019-10-21

## 2019-10-21 RX ORDER — LIDOCAINE HCL/PF 100 MG/5ML
SYRINGE (ML) INTRAVENOUS
Status: DISCONTINUED | OUTPATIENT
Start: 2019-10-21 | End: 2019-10-21

## 2019-10-21 RX ADMIN — LIDOCAINE HYDROCHLORIDE 100 MG: 20 INJECTION, SOLUTION INTRAVENOUS at 10:10

## 2019-10-21 RX ADMIN — PROPOFOL 50 MG: 10 INJECTION, EMULSION INTRAVENOUS at 10:10

## 2019-10-21 RX ADMIN — PROPOFOL 100 MG: 10 INJECTION, EMULSION INTRAVENOUS at 10:10

## 2019-10-21 RX ADMIN — SODIUM CHLORIDE: 0.9 INJECTION, SOLUTION INTRAVENOUS at 10:10

## 2019-10-21 RX ADMIN — SODIUM CHLORIDE: 0.9 INJECTION, SOLUTION INTRAVENOUS at 09:10

## 2019-10-21 NOTE — DISCHARGE INSTRUCTIONS
Discharge Instructions: Eating a High-Fiber Diet  Your health care provider has prescribed a high-fiber diet for you. Fiber is what gives strength and structure to plants. Most grains, beans, vegetables, and fruits contain fiber. Foods rich in fiber are often low in calories and fat, but they fill you up more. These foods may also reduce the risk of certain health problems.  There are two types of fiber:  · Insoluble fiber. This is found in whole grains, cereals, and certain fruits and vegetables (such as apple skins, corn, and beans). Insoluble fiber is made up mainly of plant cell walls. It may prevent constipation and reduce the risk of certain types of cancer.  · Soluble fiber. This type of fiber is found in oats, beans, nuts, and certain fruits and vegetables (such as strawberries and peas). Soluble fiber turns to gel in the digestive system, slowing the movement of the digestive tract. It helps control blood sugar levels and can reduce cholesterol, which may help lower the risk of heart disease. Soluble fiber can also help control appetite.     Home care  · Know how much fiber you need a day. The recommended daily amount of fiber is 25 grams for women and 38 grams for men. After age 50, daily fiber needs drop to 21 grams for women and 30 grams for men.  · Ask your doctor about a fiber supplement. (Always take fiber supplements with a large glass of water.)  · Keep track of how much fiber you eat.  · Eat a variety of foods high in fiber.  · Learn to read and understand food labels.  · Ask your healthcare provider how much water you should be drinking.  · Look for these high-fiber foods:  ¨ Whole-grain breads and cereals  § 6 ounces a day give you about 18 grams of fiber (1 ounce is equal to 1 slice of bread, 1 cup of dry cereal, or 1/2 cup of cooked rice).  § Include wheat and oat bran cereals, whole-wheat muffins or toast, and corn tortillas in your meals.  ¨ Fruits   § 2 cups a day give you about 8 grams of  fiber.  § Apples, oranges, strawberries, pears, and bananas are good sources.  § Fruit juice does not contain as much fiber as the fruit it was made from.  ¨ Vegetables  § 2½ cups a day give you about 11 grams of fiber. Add asparagus, carrots, broccoli, peas, and corn to your meals.  ¨ Legumes  § 1/4 cup a day (in place of meat) gives you about 4 grams of fiber. Try navy beans, lentils, chickpeas, and soybeans.  ¨ Seeds   § A small handful of seeds gives you about 3 grams of fiber. Try sunflower seeds.  Follow-up  Make a follow-up appointment with a nutritionist as directed by our staff.  Date Last Reviewed: 6/1/2015 © 2000-2017 .[employer]. [address] [place] [address]. All rights reserved. This information is not intended as a substitute for professional medical care. Always follow your healthcare professional's instructions.        Hemorrhoids    Hemorrhoids are swollen and inflamed veins inside the rectum and near the anus. The rectum is the last several inches of the colon. The anus is the passage between the rectum and the outside of the body.  Causes  The veins can become swollen due to increased pressure in them. This is most often caused by:  · Chronic constipation or diarrhea  · Straining when having a bowel movement  · Sitting too long on the toilet  · A low-fiber diet  · Pregnancy  Symptoms  · Bleeding from the rectum (this may be noticeable after bowel movements)  · Lump near the anus  · Itching around the anus  · Pain around the anus  There are different types of hemorrhoids. Depending on the type you have and the severity, you may be able to treat yourself at home. In some cases, a procedure may be the best treatment option. Your healthcare provider can tell you more about this, if needed.  Home care  General care  · To get relief from pain or itching, try:  ¨ Topical products. Your healthcare provider may prescribe or recommend creams, ointments, or pads that can be  applied to the hemorrhoid. Use these exactly as directed.  ¨ Medicines. Your healthcare provider may recommend stool softeners, suppositories, or laxatives to help manage constipation. Use these exactly as directed.  ¨ Sitz baths. A sitz bath involves sitting in a few inches of warm bath water. Be careful not to make the water so hot that you burn yourself--test it before sitting in it. Soak for about 10 to 15 minutes a few times a day. This may help relieve pain.  Tips to help prevent hemorrhoids  · Eat more fiber. Fiber adds bulk to stool and absorbs water as it moves through your colon. This makes stool softer and easier to pass.  ¨ Increase the fiber in your diet with more fiber-rich foods. These include fresh fruit, vegetables, and whole grains.  ¨ Take a fiber supplement or bulking agent, if advised to by your provider. These include products such as psyllium or methylcellulose.  · Drink plenty of water, if directed to by your provider. This can help keep stool soft.  · Be more active. Frequent exercise aids digestion and helps prevent constipation. It may also help make bowel movements more regular.  · Dont strain during bowel movements. This can make hemorrhoids more likely. Also, dont sit on the toilet for long periods of time.  Follow-up care  Follow up with your healthcare provider, or as advised. If a culture or imaging tests were done, you will be notified of the results when they are ready. This may take a few days or longer.  When to seek medical advice  Call your healthcare provider right away if any of these occur:  · Increased bleeding from the rectum  · Increased pain around the rectum or anus  · Weakness or dizziness  Call 911  Call 911 or return to the emergency department right away if any of these occur:  · Trouble breathing or swallowing  · Fainting or loss of consciousness  · Unusually fast heart rate  · Vomiting blood  · Large amounts of blood in stool  Date Last Reviewed: 6/22/2015  ©  3266-2693 [employer] [address] [place] [address], [place] [address]. All rights reserved. This information is not intended as a substitute for professional medical care. Always follow your healthcare professional's instructions.        Understanding Colon and Rectal Polyps    The colon (also called the large intestine) is a muscular tube that forms the last part of the digestive tract. It absorbs water and stores food waste. The colon is about 4 to 6 feet long. The rectum is the last 6 inches of the colon. The colon and rectum have a smooth lining composed of millions of cells. Changes in these cells can lead to growths in the colon that can become cancerous and should be removed. Multiple tests are available to screen for colon cancer, but the colonoscopy is the most recommended test. During colonoscopy, these polyps can be removed. How often you need this test depends on many things including your condition, your family history, symptoms, and what the findings were at the previous colonoscopy.   When the colon lining changes  Changes that happen in the cells that line the colon or rectum can lead to growths called polyps. Over a period of years, polyps can turn cancerous. Removing polyps early may prevent cancer from ever forming.  Polyps  Polyps are fleshy clumps of tissue that form on the lining of the colon or rectum. Small polyps are usually benign (not cancerous). However, over time, cells in a polyp can change and become cancerous. Certain types of polyps known as adenomatous polyps are premalignant. The risk for invasive cancer increases with the size of the polyp and certain cell and gene features. This means that they can become cancerous if they're not removed. Hyperplastic polyps are benign. They can grow quite large and not turn cancerous.   Cancer  Almost all colorectal cancers start when polyp cells begin growing abnormally. As a cancerous tumor grows, it may involve more and more of the  colon or rectum. In time, cancer can also grow beyond the colon or rectum and spread to nearby organs or to glands called lymph nodes. The cells can also travel to other parts of the body. This is known as metastasis. The earlier a cancerous tumor is removed, the better the chance of preventing its spread.    Date Last Reviewed: 8/1/2016 © 2000-2017 [employer]. [address] [place] [address], [place] [address]. All rights reserved. This information is not intended as a substitute for professional medical care. Always follow your healthcare professional's instructions.        Colonoscopy     A camera attached to a flexible tube with a viewing lens is used to take video pictures.     Colonoscopy is a test to view the inside of your lower digestive tract (colon and rectum). Sometimes it can show the last part of the small intestine (ileum). During the test, small pieces of tissue may be removed for testing. This is called a biopsy. Small growths, such as polyps, may also be removed.   Why is colonoscopy done?  The test is done to help look for colon cancer. And it can help find the source of abdominal pain, bleeding, and changes in bowel habits. It may be needed once a year, depending on factors such as your:  · Age  · Health history  · Family health history  · Symptoms  · Results from any prior colonoscopy  Risks and possible complications  These include:  · Bleeding               · A puncture or tear in the colon   · Risks of anesthesia  · A cancer lesion not being seen  Getting ready   To prepare for the test:  · Talk with your healthcare provider about the risks of the test (see below). Also ask your healthcare provider about alternatives to the test.  · Tell your healthcare provider about any medicines you take. Also tell him or her about any health conditions you may have.  · Make sure your rectum and colon are empty for the test. Follow the diet and bowel prep instructions exactly. If you dont, the test  may need to be rescheduled.  · Plan for a friend or family member to drive you home after the test.     Colonoscopy provides an inside view of the entire colon.     You may discuss the results with your doctor right away or at a future visit.  During the test   The test is usually done in the hospital on an outpatient basis. This means you go home the same day. The procedure takes about 30 minutes. During that time:  · You are given relaxing (sedating) medicine through an IV line. You may be drowsy, or fully asleep.  · The healthcare provider will first give you a physical exam to check for anal and rectal problems.  · Then the anus is lubricated and the scope inserted.  · If you are awake, you may have a feeling similar to needing to have a bowel movement. You may also feel pressure as air is pumped into the colon. Its OK to pass gas during the procedure.  · Biopsy, polyp removal, or other treatments may be done during the test.  After the test   You may have gas right after the test. It can help to try to pass it to help prevent later bloating. Your healthcare provider may discuss the results with you right away. Or you may need to schedule a follow-up visit to talk about the results. After the test, you can go back to your normal eating and other activities. You may be tired from the sedation and need to rest for a few hours.  Date Last Reviewed: 11/1/2016 © 2000-2017 [employer]. [address] [place] [address], [place] [address]. All rights reserved. This information is not intended as a substitute for professional medical care. Always follow your healthcare professional's instructions.        Discharge Instructions: After Your Surgery  Youve just had surgery. During surgery, you were given medicine called anesthesia to keep you relaxed and free of pain. After surgery, you may have some pain or nausea. This is common. Here are some tips for feeling better and getting well after surgery.     Stay on  schedule with your medicine.   Going home  Your healthcare provider will show you how to take care of yourself when you go home. He or she will also answer your questions. Have an adult family member or friend drive you home. For the first 24 hours after your surgery:  · Do not drive or use heavy equipment.  · Do not make important decisions or sign legal papers.  · Do not drink alcohol.  · Have someone stay with you, if needed. He or she can watch for problems and help keep you safe.  Be sure to go to all follow-up visits with your healthcare provider. And rest after your surgery for as long as your healthcare provider tells you to.  Coping with pain  If you have pain after surgery, pain medicine will help you feel better. Take it as told, before pain becomes severe. Also, ask your healthcare provider or pharmacist about other ways to control pain. This might be with heat, ice, or relaxation. And follow any other instructions your surgeon or nurse gives you.  Tips for taking pain medicine  To get the best relief possible, remember these points:  · Pain medicines can upset your stomach. Taking them with a little food may help.  · Most pain relievers taken by mouth need at least 20 to 30 minutes to start to work.  · Taking medicine on a schedule can help you remember to take it. Try to time your medicine so that you can take it before starting an activity. This might be before you get dressed, go for a walk, or sit down for dinner.  · Constipation is a common side effect of pain medicines. Call your healthcare provider before taking any medicines such as laxatives or stool softeners to help ease constipation. Also ask if you should skip any foods. Drinking lots of fluids and eating foods such as fruits and vegetables that are high in fiber can also help. Remember, do not take laxatives unless your surgeon has prescribed them.  · Drinking alcohol and taking pain medicine can cause dizziness and slow your breathing. It  can even be deadly. Do not drink alcohol while taking pain medicine.  · Pain medicine can make you react more slowly to things. Do not drive or run machinery while taking pain medicine.  Your healthcare provider may tell you to take acetaminophen to help ease your pain. Ask him or her how much you are supposed to take each day. Acetaminophen or other pain relievers may interact with your prescription medicines or other over-the-counter (OTC) medicines. Some prescription medicines have acetaminophen and other ingredients. Using both prescription and OTC acetaminophen for pain can cause you to overdose. Read the labels on your OTC medicines with care. This will help you to clearly know the list of ingredients, how much to take, and any warnings. It may also help you not take too much acetaminophen. If you have questions or do not understand the information, ask your pharmacist or healthcare provider to explain it to you before you take the OTC medicine.  Managing nausea  Some people have an upset stomach after surgery. This is often because of anesthesia, pain, or pain medicine, or the stress of surgery. These tips will help you handle nausea and eat healthy foods as you get better. If you were on a special food plan before surgery, ask your healthcare provider if you should follow it while you get better. These tips may help:  · Do not push yourself to eat. Your body will tell you when to eat and how much.  · Start off with clear liquids and soup. They are easier to digest.  · Next try semi-solid foods, such as mashed potatoes, applesauce, and gelatin, as you feel ready.  · Slowly move to solid foods. Dont eat fatty, rich, or spicy foods at first.  · Do not force yourself to have 3 large meals a day. Instead eat smaller amounts more often.  · Take pain medicines with a small amount of solid food, such as crackers or toast, to avoid nausea.     Call your surgeon if  · You still have pain an hour after taking  medicine. The medicine may not be strong enough.  · You feel too sleepy, dizzy, or groggy. The medicine may be too strong.  · You have side effects like nausea, vomiting, or skin changes, such as rash, itching, or hives.       If you have obstructive sleep apnea  You were given anesthesia medicine during surgery to keep you comfortable and free of pain. After surgery, you may have more apnea spells because of this medicine and other medicines you were given. The spells may last longer than usual.   At home:  · Keep using the continuous positive airway pressure (CPAP) device when you sleep. Unless your healthcare provider tells you not to, use it when you sleep, day or night. CPAP is a common device used to treat obstructive sleep apnea.  · Talk with your provider before taking any pain medicine, muscle relaxants, or sedatives. Your provider will tell you about the possible dangers of taking these medicines.  Date Last Reviewed: 12/1/2016  © 1144-6800 [employer]. [address] [place] [address], [place] [address]. All rights reserved. This information is not intended as a substitute for professional medical care. Always follow your healthcare professional's instructions.

## 2019-10-21 NOTE — ANESTHESIA PREPROCEDURE EVALUATION
10/21/2019  Marsha Choi is a 53 y.o., female.    Anesthesia Evaluation    I have reviewed the Patient Summary Reports.    I have reviewed the Nursing Notes.   I have reviewed the Medications.     Review of Systems  Anesthesia Hx:  No problems with previous Anesthesia    Social:  Former Smoker    Cardiovascular:  Cardiovascular Normal     Pulmonary:   Sleep Apnea, CPAP    Renal/:   Chronic Renal Disease renal calculi    Musculoskeletal:   Arthritis     Neurological:   Headaches    Endocrine:  Endocrine Normal        Physical Exam  General:  Well nourished, Obesity    Airway/Jaw/Neck:  Airway Findings: Mouth Opening: Normal Tongue: Normal  General Airway Assessment: Adult  Oropharynx Findings:  Mallampati: II  Jaw/Neck Findings:  Neck ROM: Normal ROM     Eyes/Ears/Nose:  Eyes/Ears/Nose Findings:    Dental:  Dental Findings:   Chest/Lungs:  Chest/Lungs Findings: Normal Respiratory Rate     Heart/Vascular:  Heart Findings: Rate: Normal  Rhythm: Regular Rhythm        Mental Status:  Mental Status Findings:  Cooperative, Alert and Oriented         Anesthesia Plan  Type of Anesthesia, risks & benefits discussed:  Anesthesia Type:  general  Patient's Preference:   Intra-op Monitoring Plan: standard ASA monitors  Intra-op Monitoring Plan Comments:   Post Op Pain Control Plan: multimodal analgesia  Post Op Pain Control Plan Comments:   Induction:   IV  Beta Blocker:  Patient is not currently on a Beta-Blocker (No further documentation required).       Informed Consent: Patient understands risks and agrees with Anesthesia plan.  Questions answered. Anesthesia consent signed with patient.  ASA Score: 2     Day of Surgery Review of History & Physical:  There are no significant changes.   H&P completed by Anesthesiologist.       Ready For Surgery From Anesthesia Perspective.

## 2019-10-21 NOTE — ANESTHESIA POSTPROCEDURE EVALUATION
Anesthesia Post Evaluation    Patient: Marsha Choi    Procedure(s) Performed: Procedure(s) (LRB):  COLONOSCOPY (N/A)    Final Anesthesia Type: general  Patient location during evaluation: PACU  Patient participation: Yes- Able to Participate  Level of consciousness: awake and alert and oriented  Post-procedure vital signs: reviewed and stable  Pain management: adequate  Airway patency: patent  PONV status at discharge: No PONV  Anesthetic complications: no      Cardiovascular status: blood pressure returned to baseline and stable  Respiratory status: unassisted and spontaneous ventilation  Hydration status: euvolemic  Follow-up not needed.          Vitals Value Taken Time   /65 10/21/2019 11:42 AM   Temp 36.6 °C (97.9 °F) 10/21/2019 11:15 AM   Pulse 70 10/21/2019 11:44 AM   Resp 16 10/21/2019 11:30 AM   SpO2 98 % 10/21/2019 11:44 AM   Vitals shown include unvalidated device data.      Event Time     Out of Recovery 12:00:09          Pain/Catalina Score: Pain Rating Prior to Med Admin: 0 (10/21/2019 11:30 AM)  Catalina Score: 10 (10/21/2019 11:30 AM)

## 2019-10-21 NOTE — TRANSFER OF CARE
"Anesthesia Transfer of Care Note    Patient: Marsha Choi    Procedure(s) Performed: Procedure(s) (LRB):  COLONOSCOPY (N/A)    Patient location: PACU    Anesthesia Type: general    Transport from OR: Transported from OR on room air with adequate spontaneous ventilation    Post pain: adequate analgesia    Post assessment: no apparent anesthetic complications and tolerated procedure well    Post vital signs: stable    Level of consciousness: sedated    Nausea/Vomiting: no nausea/vomiting    Complications: none    Transfer of care protocol was followed      Last vitals:   Visit Vitals  /63 (BP Location: Left arm, Patient Position: Lying)   Pulse 86   Temp 36.6 °C (97.9 °F) (Skin)   Resp 13   Ht 5' 5" (1.651 m)   Wt 96.6 kg (213 lb)   SpO2 95%   Breastfeeding? No   BMI 35.45 kg/m²     "

## 2019-10-21 NOTE — H&P
CC: Screening for colorectal cancer - first occurrence    53 year old female with above. States that symptoms are absent, no alleviating/exacerbating factors. No family history of CA. No personal history of polyps. No bleeding or weight loss.     ROS:  No headache, no fever/chills, no chest pain/SOB, no nausea/vomiting/diarrhea/constipation/GI bleeding/abdominal pain, no dysuria/hematuria.    VSSAF   Exam:   Alert and oriented x 3; no apparent distress   PERRLA, sclera anicteric  CV: Regular rate/rhythm, normal PMI   Lungs: Clear bilaterally with no wheeze/rales   Abdomen: Soft, NT/ND, normal bowel sounds   Ext: No cyanosis, clubbing     Impression:   As above    Plan:   Proceed with endoscopy. Further recs to follow.

## 2019-10-21 NOTE — PROVATION PATIENT INSTRUCTIONS
Discharge Summary/Instructions after an Endoscopic Procedure  Patient Name: Marsha Choi  Patient MRN: [id number]  Patient YOB: 1965  Monday, October 21, 2019  Tereso Chahal MD  RESTRICTIONS:  During your procedure today, you received medications for sedation.  These   medications may affect your judgment, balance and coordination.  Therefore,   for 24 hours, you have the following restrictions:   - DO NOT drive a car, operate machinery, make legal/financial decisions,   sign important papers or drink alcohol.    ACTIVITY:  Today: no heavy lifting, straining or running due to procedural   sedation/anesthesia.  The following day: return to full activity including work.  DIET:  Eat and drink normally unless instructed otherwise.     TREATMENT FOR COMMON SIDE EFFECTS:  - Mild abdominal pain, nausea, belching, bloating or excessive gas:  rest,   eat lightly and use a heating pad.  - Sore Throat: treat with throat lozenges and/or gargle with warm salt   water.  - Because air was used during the procedure, expelling large amounts of air   from your rectum or belching is normal.  - If a bowel prep was taken, you may not have a bowel movement for 1-3 days.    This is normal.  SYMPTOMS TO WATCH FOR AND REPORT TO YOUR PHYSICIAN:  1. Abdominal pain or bloating, other than gas cramps.  2. Chest pain.  3. Back pain.  4. Signs of infection such as: chills or fever occurring within 24 hours   after the procedure.  5. Rectal bleeding, which would show as bright red, maroon, or black stools.   (A tablespoon of blood from the rectum is not serious, especially if   hemorrhoids are present.)  6. Vomiting.  7. Weakness or dizziness.  GO DIRECTLY TO THE NEAREST EMERGENCY ROOM IF YOU HAVE ANY OF THE FOLLOWING:      Difficulty breathing              Chills and/or fever over 101 F   Persistent vomiting and/or vomiting blood   Severe abdominal pain   Severe chest pain   Black, tarry stools   Bleeding- more than one  tablespoon   Any other symptom or condition that you feel may need urgent attention  Your doctor recommends these additional instructions:  If any biopsies were taken, your doctors clinic will contact you in 1 to 2   weeks with any results.  - Patient has a contact number available for emergencies.  The signs and   symptoms of potential delayed complications were discussed with the   patient.  Return to normal activities tomorrow.  Written discharge   instructions were provided to the patient.   - High fiber diet.   - Continue present medications.   - No aspirin, ibuprofen, naproxen, or other non-steroidal anti-inflammatory   drugs for 2 weeks after polyp removal.   - Await pathology results.   - Repeat colonoscopy in 3 years for surveillance.   - Discharge patient to home (ambulatory).   - Return to my office PRN.  For questions, problems or results please call your physician - Tereso Chahal MD at Work:  [phone].  [facility], EMERGENCY ROOM PHONE NUMBER: [phone]  IF A COMPLICATION OR EMERGENCY SITUATION ARISES AND YOU ARE UNABLE TO REACH   YOUR PHYSICIAN - GO DIRECTLY TO THE EMERGENCY ROOM.  Tereso Chahal MD  10/21/2019 11:06:49 AM  This report has been verified and signed electronically.  [employer]ATION

## 2019-10-23 ENCOUNTER — OFFICE VISIT (OUTPATIENT)
Dept: BARIATRICS | Facility: CLINIC | Age: 54
End: 2019-10-23
Payer: COMMERCIAL

## 2019-10-23 ENCOUNTER — PATIENT MESSAGE (OUTPATIENT)
Dept: GASTROENTEROLOGY | Facility: CLINIC | Age: 54
End: 2019-10-23

## 2019-10-23 VITALS
DIASTOLIC BLOOD PRESSURE: 78 MMHG | HEART RATE: 71 BPM | SYSTOLIC BLOOD PRESSURE: 155 MMHG | RESPIRATION RATE: 16 BRPM | TEMPERATURE: 98 F | WEIGHT: 212.38 LBS | BODY MASS INDEX: 35.38 KG/M2 | HEIGHT: 65 IN

## 2019-10-23 DIAGNOSIS — G47.33 OSA (OBSTRUCTIVE SLEEP APNEA): ICD-10-CM

## 2019-10-23 DIAGNOSIS — E66.01 MORBID OBESITY: Primary | ICD-10-CM

## 2019-10-23 DIAGNOSIS — E66.01 CLASS 2 SEVERE OBESITY DUE TO EXCESS CALORIES WITH SERIOUS COMORBIDITY AND BODY MASS INDEX (BMI) OF 35.0 TO 35.9 IN ADULT: ICD-10-CM

## 2019-10-23 PROCEDURE — 99213 OFFICE O/P EST LOW 20 MIN: CPT | Mod: S$GLB,,, | Performed by: SURGERY

## 2019-10-23 PROCEDURE — 3008F BODY MASS INDEX DOCD: CPT | Mod: CPTII,S$GLB,, | Performed by: SURGERY

## 2019-10-23 PROCEDURE — 99999 PR PBB SHADOW E&M-EST. PATIENT-LVL III: ICD-10-PCS | Mod: PBBFAC,,, | Performed by: SURGERY

## 2019-10-23 PROCEDURE — 99213 PR OFFICE/OUTPT VISIT, EST, LEVL III, 20-29 MIN: ICD-10-PCS | Mod: S$GLB,,, | Performed by: SURGERY

## 2019-10-23 PROCEDURE — 3008F PR BODY MASS INDEX (BMI) DOCUMENTED: ICD-10-PCS | Mod: CPTII,S$GLB,, | Performed by: SURGERY

## 2019-10-23 PROCEDURE — 99999 PR PBB SHADOW E&M-EST. PATIENT-LVL III: CPT | Mod: PBBFAC,,, | Performed by: SURGERY

## 2019-10-23 NOTE — PROGRESS NOTES
Medically Supervised Weight Loss Documentation    Date of Visit: 10/23/2019    Patient: Marsha Choi    Current Weight: 212  Current BMI: Body mass index is 35.35 kg/m².  Weight Change: +1    Last Weight: 213    Beginning Weight: 211      Vitals:   Vitals:    10/23/19 1536   BP: (!) 155/78   Pulse: 71   Resp: 16   Temp: 98.3 °F (36.8 °C)       Comorbidities:   Past Medical History:   Diagnosis Date    Allergy     Chest pain     negative stress test and heart cath    Headache     Kidney stone     ALMA (obstructive sleep apnea)     Seasonal allergies        Medications:  Current Outpatient Medications on File Prior to Visit   Medication Sig Dispense Refill    butalbital-acetaminop-caf-cod -22-30 mg Cap Take 1 capsule by mouth every 6 (six) hours as needed. (Patient not taking: Reported on 10/23/2019) 12 capsule 3    cetirizine (ZYRTEC) 10 MG tablet Take 10 mg by mouth once daily.      galcanezumab-gnlm (EMGALITY PEN) 120 mg/mL PnIj Inject 120 mg into the skin every 28 days. (Patient not taking: Reported on 10/23/2019) 1 mL 11    sumatriptan (IMITREX) 50 MG tablet TAKE 1 TABLET AT ONSET OF HEADACHE MAY REPEAT IN 2 HOURS TO A MAX OF 3 PER DAY 2 DAYS PER WEEK (Patient not taking: Reported on 10/23/2019) 9 tablet 3     No current facility-administered medications on file prior to visit.          Body comp:  Fat Percent:  47.6 %  Fat Mass:  101.2 lb  FFM:  11.2 lb  TBW: 80.2 lb  TBW %:  37.8 %  BMR: 1576 kcal      Diet Education Discussed:    Breakfast:  Eggs with broccoli  Lunch:  Cheese roll up with proscuito, strawberries  Dinner:  talapia with broccoli     Exercise/Activity Discussed:    Walking almost daily, further each time    Behavior or Diet Goals for this patient:    Making great choices, continue low carb low jaimie dieting  Increase exercise intensity    Labs- tested positive for h pylori in the past and has been treated   EKG  UGI - small sliding hiatal hernia, small amount of reflux  dietary  consult-done  psych consult - done  Seminar  Tobacco cessastion - stopped august 31- started walking   colonscopy     I will obtain the following clearances prior to surgery: cardiology- pending  Possible surgery Dec 9, 2019     : I met with the patient for 15 minutes and counseled her for over 50% of that time

## 2019-10-28 ENCOUNTER — OFFICE VISIT (OUTPATIENT)
Dept: CARDIOLOGY | Facility: CLINIC | Age: 54
End: 2019-10-28
Payer: COMMERCIAL

## 2019-10-28 VITALS
BODY MASS INDEX: 36.55 KG/M2 | WEIGHT: 219.38 LBS | HEIGHT: 65 IN | HEART RATE: 68 BPM | DIASTOLIC BLOOD PRESSURE: 68 MMHG | SYSTOLIC BLOOD PRESSURE: 130 MMHG

## 2019-10-28 DIAGNOSIS — E66.01 MORBID OBESITY: ICD-10-CM

## 2019-10-28 DIAGNOSIS — Z01.810 PRE-OPERATIVE CARDIOVASCULAR EXAMINATION: Primary | ICD-10-CM

## 2019-10-28 DIAGNOSIS — R01.1 UNDIAGNOSED CARDIAC MURMURS: ICD-10-CM

## 2019-10-28 PROCEDURE — 93000 ELECTROCARDIOGRAM COMPLETE: CPT | Mod: S$GLB,,, | Performed by: INTERNAL MEDICINE

## 2019-10-28 PROCEDURE — 3008F BODY MASS INDEX DOCD: CPT | Mod: CPTII,S$GLB,, | Performed by: INTERNAL MEDICINE

## 2019-10-28 PROCEDURE — 99204 OFFICE O/P NEW MOD 45 MIN: CPT | Mod: S$GLB,,, | Performed by: INTERNAL MEDICINE

## 2019-10-28 PROCEDURE — 99204 PR OFFICE/OUTPT VISIT, NEW, LEVL IV, 45-59 MIN: ICD-10-PCS | Mod: S$GLB,,, | Performed by: INTERNAL MEDICINE

## 2019-10-28 PROCEDURE — 99999 PR PBB SHADOW E&M-EST. PATIENT-LVL III: ICD-10-PCS | Mod: PBBFAC,,, | Performed by: INTERNAL MEDICINE

## 2019-10-28 PROCEDURE — 3008F PR BODY MASS INDEX (BMI) DOCUMENTED: ICD-10-PCS | Mod: CPTII,S$GLB,, | Performed by: INTERNAL MEDICINE

## 2019-10-28 PROCEDURE — 93000 EKG 12-LEAD: ICD-10-PCS | Mod: S$GLB,,, | Performed by: INTERNAL MEDICINE

## 2019-10-28 PROCEDURE — 99999 PR PBB SHADOW E&M-EST. PATIENT-LVL III: CPT | Mod: PBBFAC,,, | Performed by: INTERNAL MEDICINE

## 2019-10-28 NOTE — LETTER
October 28, 2019      Erma Garcia MD  [address]  [place] [address]           [facility]  [address] [place] [address]  Phone: [phone]          Patient: Marsha Choi   MR Number: [id number]   YOB: 1965   Date of Visit: 10/28/2019       Dear Dr. Erma Garcia:    Thank you for referring Marsha Choi to me for evaluation. Attached you will find relevant portions of my assessment and plan of care.    If you have questions, please do not hesitate to call me. I look forward to following Marsha Choi along with you.    Sincerely,    Marcelo Pfeiffer MD    Enclosure  CC:  No Recipients    If you would like to receive this communication electronically, please contact [email or web address] or [phone] to request more information on [facility] Link access.    For providers and/or their staff who would like to refer a patient to [facility], please contact us through our one-stop-shop provider referral line, [facility] , at [phone].    If you feel you have received this communication in error or would no longer like to receive these types of communications, please e-mail [email or web address]

## 2019-10-28 NOTE — PROGRESS NOTES
Subjective:    Patient ID:  Marsha Choi is a 54 y.o. female who presents for evaluation of Sleep Apnea (cardiac clearance)      Problem List Items Addressed This Visit        Endocrine    Morbid obesity      Other Visit Diagnoses     Pre-operative cardiovascular examination    -  Primary          HPI    Referred by Dr. Garcia for preoperative cardiovascular assessment prior to gastric sleeve surgery.  Tentative surgery is 2019.    The patient states that she feels ok today.  No recent chest pain or shortness of breath    Personal history of heart attack or stroke - None  Family history of heart disease - Dad with CABG reportedly x 9??  METs > 4 per report    Past Medical History:   Diagnosis Date    Allergy     Chest pain     negative stress test and heart cath    Headache     Kidney stone     ALMA (obstructive sleep apnea)     Seasonal allergies        Past Surgical History:   Procedure Laterality Date    abscess removal from right side of neck      APPENDECTOMY      BREAST SURGERY Left     lumpectomy, benign    CARDIAC CATHETERIZATION      part of evaluation for chest pain, negative     SECTION      x1    COLONOSCOPY N/A 10/21/2019    Procedure: COLONOSCOPY;  Surgeon: Tereso Hoffman MD;  Location: [facility];  Service: Endoscopy;  Laterality: N/A;    ENDOMETRIAL ABLATION      TUBAL LIGATION      UPPER GASTROINTESTINAL ENDOSCOPY  prior to        Family History   Problem Relation Age of Onset    Multiple sclerosis Father     Diabetes Father     Heart disease Father 50        CAD    Diabetes Mother     Cancer Mother         breast    Heart disease Mother 50        CAD    Thyroid disease Daughter         hypothyroidism    Transient ischemic attack Sister     Colon cancer Neg Hx     Colon polyps Neg Hx     Crohn's disease Neg Hx     Esophageal cancer Neg Hx     Stomach cancer Neg Hx     Ulcerative colitis Neg Hx        Social History     Socioeconomic History     Marital status:      Spouse name: Not on file    Number of children: Not on file    Years of education: Not on file    Highest education level: Not on file   Occupational History    Not on file   Social Needs    Financial resource strain: Not on file    Food insecurity:     Worry: Not on file     Inability: Not on file    Transportation needs:     Medical: Not on file     Non-medical: Not on file   Tobacco Use    Smoking status: Former Smoker     Packs/day: 0.75     Years: 30.00     Pack years: 22.50     Types: Cigarettes    Smokeless tobacco: Never Used    Tobacco comment: quit on 8/31/2019   Substance and Sexual Activity    Alcohol use: No    Drug use: No    Sexual activity: Yes     Partners: Male   Lifestyle    Physical activity:     Days per week: Not on file     Minutes per session: Not on file    Stress: Not on file   Relationships    Social connections:     Talks on phone: Not on file     Gets together: Not on file     Attends Confucianism service: Not on file     Active member of club or organization: Not on file     Attends meetings of clubs or organizations: Not on file     Relationship status: Not on file   Other Topics Concern    Not on file   Social History Narrative     at [employer] of [employer]    Lives with  and son (20)    She buys and cooks all the food       Review of patient's allergies indicates:   Allergen Reactions    Topiramate      Marked drowsiness         Review of Systems   Constitution: Negative for decreased appetite, fever and malaise/fatigue.   Eyes: Negative for blurred vision.   Cardiovascular: Negative for chest pain, dyspnea on exertion, irregular heartbeat and leg swelling.   Respiratory: Negative for cough, hemoptysis, shortness of breath and wheezing.    Endocrine: Negative for cold intolerance and heat intolerance.   Hematologic/Lymphatic: Negative for bleeding problem.   Musculoskeletal: Negative for muscle weakness and myalgias.  "  Gastrointestinal: Negative for abdominal pain, constipation and diarrhea.   Genitourinary: Negative for bladder incontinence.   Neurological: Negative for dizziness and weakness.   Psychiatric/Behavioral: Negative for depression.        Objective:     Vitals:    10/28/19 1441   BP: 130/68   Pulse: 68   Weight: 99.5 kg (219 lb 5.7 oz)   Height: 5' 5" (1.651 m)        Physical Exam   Constitutional: She is oriented to person, place, and time. She appears well-developed and well-nourished. No distress.   HENT:   Head: Normocephalic and atraumatic.   Neck: Neck supple. No JVD present.   Cardiovascular: Normal rate and regular rhythm. Exam reveals no gallop (systolic murmur) and no friction rub.   Murmur heard.  Pulmonary/Chest: Effort normal and breath sounds normal. No respiratory distress. She has no wheezes. She has no rales.   Abdominal: Soft. Bowel sounds are normal. There is no tenderness. There is no rebound and no guarding.   Musculoskeletal: She exhibits no edema or tenderness.   Neurological: She is alert and oriented to person, place, and time.   Skin: Skin is warm and dry.   Psychiatric: Her behavior is normal.           Current Outpatient Medications on File Prior to Visit   Medication Sig    butalbital-acetaminop-caf-cod -81-30 mg Cap Take 1 capsule by mouth every 6 (six) hours as needed.    galcanezumab-gnlm (EMGALITY PEN) 120 mg/mL PnIj Inject 120 mg into the skin every 28 days.    sumatriptan (IMITREX) 50 MG tablet TAKE 1 TABLET AT ONSET OF HEADACHE MAY REPEAT IN 2 HOURS TO A MAX OF 3 PER DAY 2 DAYS PER WEEK    cetirizine (ZYRTEC) 10 MG tablet Take 10 mg by mouth once daily.     No current facility-administered medications on file prior to visit.        Lipid Panel:   Lab Results   Component Value Date    CHOL 196 10/14/2019    HDL 43 10/14/2019    LDLCALC 130.2 10/14/2019    TRIG 114 10/14/2019    CHOLHDL 21.9 10/14/2019         The 10-year ASCVD risk score (Winslow TIFFANY Jr., et al., 2013) is: " 2.3%    Values used to calculate the score:      Age: 54 years      Sex: Female      Is Non- : No      Diabetic: No      Tobacco smoker: No      Systolic Blood Pressure: 130 mmHg      Is BP treated: No      HDL Cholesterol: 43 mg/dL      Total Cholesterol: 196 mg/dL    All pertinent labs, imaging, and EKGs reviewed.    Assessment:       1. Pre-operative cardiovascular examination    2. Morbid obesity         Plan:     Doing ok from a symptom standpoint  BP/Pulse OK today  METs  > 4  No indication for stress testing at this time     Echocardiogram     Continue other cardiac medications  Mediterranean Diet/Cardiovascular Exercise Program    As long as the above studies are without acute issues, with the above recommendations, the patient is optimized for the above mentioned procedure.    Patient queried and all questions were answered.    F/u in 6 months to reassess symptoms      Signed:    Marcelo Pfeiffer MD  10/28/2019 10:05 AM

## 2019-10-31 ENCOUNTER — CLINICAL SUPPORT (OUTPATIENT)
Dept: CARDIOLOGY | Facility: CLINIC | Age: 54
End: 2019-10-31
Attending: INTERNAL MEDICINE
Payer: COMMERCIAL

## 2019-10-31 VITALS
SYSTOLIC BLOOD PRESSURE: 138 MMHG | HEART RATE: 84 BPM | DIASTOLIC BLOOD PRESSURE: 87 MMHG | WEIGHT: 219 LBS | BODY MASS INDEX: 36.49 KG/M2 | HEIGHT: 65 IN

## 2019-10-31 DIAGNOSIS — R01.1 UNDIAGNOSED CARDIAC MURMURS: ICD-10-CM

## 2019-10-31 PROCEDURE — 99999 PR PBB SHADOW E&M-EST. PATIENT-LVL II: CPT | Mod: PBBFAC,,,

## 2019-10-31 PROCEDURE — 99999 PR PBB SHADOW E&M-EST. PATIENT-LVL II: ICD-10-PCS | Mod: PBBFAC,,,

## 2019-10-31 PROCEDURE — 93306 TTE W/DOPPLER COMPLETE: CPT | Mod: S$GLB,,, | Performed by: INTERNAL MEDICINE

## 2019-10-31 PROCEDURE — 93306 ECHO (CUPID ONLY): ICD-10-PCS | Mod: S$GLB,,, | Performed by: INTERNAL MEDICINE

## 2019-11-01 LAB
ASCENDING AORTA: 2.46 CM
AV INDEX (PROSTH): 0.77
AV MEAN GRADIENT: 3 MMHG
AV PEAK GRADIENT: 6 MMHG
AV VALVE AREA: 2.37 CM2
AV VELOCITY RATIO: 0.84
BSA FOR ECHO PROCEDURE: 2.13 M2
CV ECHO LV RWT: 0.52 CM
DOP CALC AO PEAK VEL: 1.2 M/S
DOP CALC AO VTI: 25.32 CM
DOP CALC LVOT AREA: 3.1 CM2
DOP CALC LVOT DIAMETER: 1.98 CM
DOP CALC LVOT PEAK VEL: 1.01 M/S
DOP CALC LVOT STROKE VOLUME: 60.04 CM3
DOP CALCLVOT PEAK VEL VTI: 19.51 CM
E WAVE DECELERATION TIME: 175.52 MSEC
E/A RATIO: 1.13
E/E' RATIO: 7 M/S
ECHO LV POSTERIOR WALL: 1.07 CM (ref 0.6–1.1)
FRACTIONAL SHORTENING: 38 % (ref 28–44)
INTERVENTRICULAR SEPTUM: 1.1 CM (ref 0.6–1.1)
IVRT: 0.11 MSEC
LA MAJOR: 3.8 CM
LA MINOR: 4 CM
LA WIDTH: 2.43 CM
LEFT ATRIUM SIZE: 2.83 CM
LEFT ATRIUM VOLUME INDEX: 11.1 ML/M2
LEFT ATRIUM VOLUME: 22.78 CM3
LEFT INTERNAL DIMENSION IN SYSTOLE: 2.58 CM (ref 2.1–4)
LEFT VENTRICLE DIASTOLIC VOLUME INDEX: 36.91 ML/M2
LEFT VENTRICLE DIASTOLIC VOLUME: 75.88 ML
LEFT VENTRICLE MASS INDEX: 73 G/M2
LEFT VENTRICLE SYSTOLIC VOLUME INDEX: 11.7 ML/M2
LEFT VENTRICLE SYSTOLIC VOLUME: 24.11 ML
LEFT VENTRICULAR INTERNAL DIMENSION IN DIASTOLE: 4.14 CM (ref 3.5–6)
LEFT VENTRICULAR MASS: 150.59 G
LV LATERAL E/E' RATIO: 7 M/S
LV SEPTAL E/E' RATIO: 7 M/S
MV PEAK A VEL: 0.56 M/S
MV PEAK E VEL: 0.63 M/S
PISA TR MAX VEL: 2.16 M/S
PULM VEIN S/D RATIO: 1.26
PV PEAK D VEL: 0.65 M/S
PV PEAK S VEL: 0.82 M/S
RA MAJOR: 3.89 CM
RA PRESSURE: 3 MMHG
RA WIDTH: 3.55 CM
RIGHT VENTRICULAR END-DIASTOLIC DIMENSION: 3.46 CM
RV TISSUE DOPPLER FREE WALL SYSTOLIC VELOCITY 1 (APICAL 4 CHAMBER VIEW): 10.82 CM/S
SINUS: 2.62 CM
STJ: 2.29 CM
TDI LATERAL: 0.09 M/S
TDI SEPTAL: 0.09 M/S
TDI: 0.09 M/S
TR MAX PG: 19 MMHG
TRICUSPID ANNULAR PLANE SYSTOLIC EXCURSION: 2.29 CM
TV REST PULMONARY ARTERY PRESSURE: 22 MMHG

## 2019-11-04 ENCOUNTER — TELEPHONE (OUTPATIENT)
Dept: BARIATRICS | Facility: CLINIC | Age: 54
End: 2019-11-04

## 2019-11-04 NOTE — TELEPHONE ENCOUNTER
----- Message from Elias Sánchez sent at 11/4/2019  1:42 PM CST -----  Type:  Patient Returning Call    Who Called:  Self   Who Left Message for Patient:  NA   Does the patient know what this is regarding?:  KAROLINA   Best Call Back Number:  [phone]   Additional Information:

## 2019-11-06 ENCOUNTER — OFFICE VISIT (OUTPATIENT)
Dept: BARIATRICS | Facility: CLINIC | Age: 54
End: 2019-11-06
Payer: COMMERCIAL

## 2019-11-06 VITALS
BODY MASS INDEX: 35.52 KG/M2 | HEIGHT: 65 IN | RESPIRATION RATE: 16 BRPM | TEMPERATURE: 98 F | WEIGHT: 213.19 LBS | SYSTOLIC BLOOD PRESSURE: 142 MMHG | HEART RATE: 83 BPM | DIASTOLIC BLOOD PRESSURE: 79 MMHG

## 2019-11-06 DIAGNOSIS — M51.36 DDD (DEGENERATIVE DISC DISEASE), LUMBAR: ICD-10-CM

## 2019-11-06 DIAGNOSIS — E66.01 MORBID OBESITY: Primary | ICD-10-CM

## 2019-11-06 DIAGNOSIS — G47.33 OSA (OBSTRUCTIVE SLEEP APNEA): ICD-10-CM

## 2019-11-06 DIAGNOSIS — E66.09 CLASS 2 OBESITY DUE TO EXCESS CALORIES WITHOUT SERIOUS COMORBIDITY WITH BODY MASS INDEX (BMI) OF 35.0 TO 35.9 IN ADULT: ICD-10-CM

## 2019-11-06 PROCEDURE — 99214 OFFICE O/P EST MOD 30 MIN: CPT | Mod: S$GLB,,, | Performed by: SURGERY

## 2019-11-06 PROCEDURE — 3008F PR BODY MASS INDEX (BMI) DOCUMENTED: ICD-10-PCS | Mod: CPTII,S$GLB,, | Performed by: SURGERY

## 2019-11-06 PROCEDURE — 99214 PR OFFICE/OUTPT VISIT, EST, LEVL IV, 30-39 MIN: ICD-10-PCS | Mod: S$GLB,,, | Performed by: SURGERY

## 2019-11-06 PROCEDURE — 99999 PR PBB SHADOW E&M-EST. PATIENT-LVL III: CPT | Mod: PBBFAC,,, | Performed by: SURGERY

## 2019-11-06 PROCEDURE — 3008F BODY MASS INDEX DOCD: CPT | Mod: CPTII,S$GLB,, | Performed by: SURGERY

## 2019-11-06 PROCEDURE — 99999 PR PBB SHADOW E&M-EST. PATIENT-LVL III: ICD-10-PCS | Mod: PBBFAC,,, | Performed by: SURGERY

## 2019-11-06 NOTE — PATIENT INSTRUCTIONS
Pre op Diet-start nov 7, 2019    Breakfast- protein shake  Lunch- protein shake  Dinner- 3 ounces of meat and vegetables ( from list)    NO SNACKING  Only water to drink

## 2019-11-06 NOTE — PROGRESS NOTES
Follow up    SUBJECTIVE:     Chief Complaint   Patient presents with    Obesity       History of Present Illness:    Patient is a 54 y.o. female who is referred for evaluation of surgical treatment of morbid obesity. Her Body mass index is 35.48 kg/m². She has known comorbidities of obstructive sleep apnea. She has attended the bariatric seminar and is most interested in gastric sleeve surgery.    Diet    B- eggs, turkey borjas  L- broccolli, beans, ham roll ups with cheese  D- talipia or other fish with vegetables.      Exercise:  Stairs and walking, waiting for bow flew    Review of patient's allergies indicates:   Allergen Reactions    Morphine Hallucinations    Topiramate      Marked drowsiness         Current Outpatient Medications   Medication Sig Dispense Refill    galcanezumab-gnlm (EMGALITY PEN) 120 mg/mL PnIj Inject 120 mg into the skin every 28 days. 1 mL 11    butalbital-acetaminop-caf-cod -99-30 mg Cap Take 1 capsule by mouth every 6 (six) hours as needed. (Patient not taking: Reported on 2019) 12 capsule 3    cetirizine (ZYRTEC) 10 MG tablet Take 10 mg by mouth once daily.      sumatriptan (IMITREX) 50 MG tablet TAKE 1 TABLET AT ONSET OF HEADACHE MAY REPEAT IN 2 HOURS TO A MAX OF 3 PER DAY 2 DAYS PER WEEK (Patient not taking: Reported on 2019) 9 tablet 3     No current facility-administered medications for this visit.        Past Medical History:   Diagnosis Date    Allergy     Chest pain     negative stress test and heart cath    Headache     Kidney stone     ALMA (obstructive sleep apnea)     Seasonal allergies      Past Surgical History:   Procedure Laterality Date    abscess removal from right side of neck      APPENDECTOMY      BREAST SURGERY Left     lumpectomy, benign    CARDIAC CATHETERIZATION      part of evaluation for chest pain, negative     SECTION      x1    COLONOSCOPY N/A 10/21/2019    Procedure: COLONOSCOPY;  Surgeon: Tereso Hoffman MD;   Location: [facility];  Service: Endoscopy;  Laterality: N/A;    ENDOMETRIAL ABLATION      TUBAL LIGATION      UPPER GASTROINTESTINAL ENDOSCOPY  prior to 2012     Family History   Problem Relation Age of Onset    Multiple sclerosis Father     Diabetes Father     Heart disease Father 50        CAD    Diabetes Mother     Cancer Mother         breast    Heart disease Mother 50        CAD    Thyroid disease Daughter         hypothyroidism    Transient ischemic attack Sister     Colon cancer Neg Hx     Colon polyps Neg Hx     Crohn's disease Neg Hx     Esophageal cancer Neg Hx     Stomach cancer Neg Hx     Ulcerative colitis Neg Hx      Social History     Tobacco Use    Smoking status: Former Smoker     Packs/day: 0.75     Years: 30.00     Pack years: 22.50     Types: Cigarettes    Smokeless tobacco: Never Used    Tobacco comment: quit on 8/31/2019   Substance Use Topics    Alcohol use: No    Drug use: No        Review of Systems:  Review of Systems   Constitutional: Positive for fatigue and unexpected weight change. Negative for chills, diaphoresis and fever.   HENT: Negative for congestion, sinus pressure, sneezing, sore throat, tinnitus and voice change.    Eyes: Negative for pain, redness and itching.   Respiratory: Negative for apnea, cough, choking, chest tightness, shortness of breath, wheezing and stridor.    Cardiovascular: Negative for chest pain, palpitations and leg swelling.   Gastrointestinal: Negative for abdominal pain, anal bleeding, constipation, diarrhea, nausea and vomiting.   Endocrine: Negative for cold intolerance and heat intolerance.   Genitourinary: Negative for difficulty urinating and dysuria.   Musculoskeletal: Negative for arthralgias, back pain and gait problem.   Skin: Negative for rash and wound.   Allergic/Immunologic: Negative for environmental allergies and food allergies.   Neurological: Negative for dizziness, light-headedness and headaches.   Hematological:  "Negative for adenopathy. Does not bruise/bleed easily.   Psychiatric/Behavioral: Negative for agitation and confusion.       OBJECTIVE:     Vital Signs (Most Recent)  Temp: 98.2 °F (36.8 °C) (11/06/19 1611)  Pulse: 83 (11/06/19 1611)  Resp: 16 (11/06/19 1611)  BP: (!) 142/79 (11/06/19 1611)  5' 5" (1.651 m)  96.7 kg (213 lb 3.2 oz)     Body comp:  Fat Percent:  49.1 %  Fat Mass:  104.6 lb  FFM:  108.6 lb  TBW: 78.2 lb  TBW %:  36.7 %  BMR: 1550 kcal        Physical Exam:  Physical Exam   Constitutional: She is oriented to person, place, and time. She appears well-developed and well-nourished. No distress.   HENT:   Head: Normocephalic and atraumatic.   Mouth/Throat: No oropharyngeal exudate.   Eyes: Pupils are equal, round, and reactive to light. Conjunctivae and EOM are normal. No scleral icterus.   Neck: Normal range of motion. Neck supple. No JVD present. No tracheal deviation present. No thyromegaly present.   Cardiovascular: Normal rate, regular rhythm and normal heart sounds. Exam reveals no gallop and no friction rub.   No murmur heard.  Pulmonary/Chest: Effort normal and breath sounds normal. No stridor. No respiratory distress. She has no wheezes. She has no rales. She exhibits no tenderness.   Abdominal: Soft. Bowel sounds are normal. She exhibits no distension and no mass. There is no tenderness. There is no rebound and no guarding.       Musculoskeletal: Normal range of motion. She exhibits no edema or tenderness.   Lymphadenopathy:     She has no cervical adenopathy.   Neurological: She is alert and oriented to person, place, and time. No cranial nerve deficit.   Skin: Skin is warm and dry. No rash noted. She is not diaphoretic. No erythema.   Psychiatric: She has a normal mood and affect. Her behavior is normal.   Nursing note and vitals reviewed.      ASSESSMENT/PLAN:         Labs- tested positive for h pylori in the past and has been treated   EKG  UGI - small sliding hiatal hernia, small amount of " reflux  dietary consult-done  psych consult - done  Seminar  Tobacco cessastion - stopped august 31- started walking   colonoscopy- done     I will obtain the following clearances prior to surgery: Cardiology- done        1. Morbid obesity     2. Class 2 obesity due to excess calories without serious comorbidity with body mass index (BMI) of 35.0 to 35.9 in adult     3. ALMA (obstructive sleep apnea)     4. DDD (degenerative disc disease), lumbar         Plan:  Marsha Choi has morbid obesity as their Body mass index is 35.48 kg/m². She would benefit from weight loss surgery and has chosen gastric sleeve surgery as the preferred procedure. She understands that this is a tool and lifestyle change will be necessary to keep weight off. I went over possible complications of the chosen surgery with the patient and she is agreeable to surgery.    She has been instructed to start the pre operative diet.    She surgical date is Nov 18, 2019       The patient has been taught the post operative diet and understands that she will need to stay on this diet to prevent complication.  They are aware of the post operative follow up and return to see me after surgery to treat/prevent/identify any complications.  she has been advised to attend support groups post operatively.

## 2019-11-13 ENCOUNTER — TELEPHONE (OUTPATIENT)
Dept: BARIATRICS | Facility: CLINIC | Age: 54
End: 2019-11-13

## 2019-11-13 NOTE — TELEPHONE ENCOUNTER
----- Message from William Burnett sent at 11/13/2019 10:40 AM CST -----  Type: Needs Medical Advice    Who Called: Patient    Best Call Back Number: [phone]  Additional Information: Patient states that she would like a callback from Marie for a question about her clearance

## 2019-11-13 NOTE — TELEPHONE ENCOUNTER
----- Message from Mariano Mcdonough sent at 11/13/2019 11:17 AM CST -----  Contact: same  Patient called in and is requesting a call back from office regarding clearance for surgery.  Patient call back [phone]

## 2019-11-13 NOTE — TELEPHONE ENCOUNTER
called pt, lm for pt to return call to office regarding sx. auth denied, appeal started, pt needs to get in with Md carlos montelongo for clearance.

## 2019-11-14 ENCOUNTER — HOSPITAL ENCOUNTER (OUTPATIENT)
Dept: PREADMISSION TESTING | Facility: HOSPITAL | Age: 54
Discharge: HOME | End: 2019-11-14

## 2019-11-26 NOTE — TELEPHONE ENCOUNTER
DOCUMENTATION ONLY:  Prior authorization for Em[employer] re-approved from 10/27/19 to 11/25/20.    Case ID# [id number]    Co-pay: $0.00    Patient Assistance IS NOT required.     Forward to clinical pharmacist for consult & shipment. -HBR

## 2019-12-02 ENCOUNTER — PATIENT MESSAGE (OUTPATIENT)
Dept: BARIATRICS | Facility: CLINIC | Age: 54
End: 2019-12-02

## 2019-12-04 ENCOUNTER — OFFICE VISIT (OUTPATIENT)
Dept: PSYCHIATRY | Facility: CLINIC | Age: 54
End: 2019-12-04
Payer: COMMERCIAL

## 2019-12-04 ENCOUNTER — PATIENT MESSAGE (OUTPATIENT)
Dept: BARIATRICS | Facility: CLINIC | Age: 54
End: 2019-12-04

## 2019-12-04 VITALS
DIASTOLIC BLOOD PRESSURE: 74 MMHG | HEART RATE: 77 BPM | HEIGHT: 65 IN | SYSTOLIC BLOOD PRESSURE: 125 MMHG | WEIGHT: 217.81 LBS | BODY MASS INDEX: 36.29 KG/M2

## 2019-12-04 DIAGNOSIS — E66.01 MORBID OBESITY: ICD-10-CM

## 2019-12-04 DIAGNOSIS — Z71.89 ENCOUNTER FOR PSYCHOLOGICAL ASSESSMENT PRIOR TO BARIATRIC SURGERY: Primary | ICD-10-CM

## 2019-12-04 DIAGNOSIS — G47.33 OSA (OBSTRUCTIVE SLEEP APNEA): ICD-10-CM

## 2019-12-04 DIAGNOSIS — E66.9 OBESITY (BMI 30.0-34.9): ICD-10-CM

## 2019-12-04 PROCEDURE — 99999 PR PBB SHADOW E&M-EST. PATIENT-LVL III: CPT | Mod: PBBFAC,,, | Performed by: PSYCHIATRY & NEUROLOGY

## 2019-12-04 PROCEDURE — 90792 PR PSYCHIATRIC DIAGNOSTIC EVALUATION W/MEDICAL SERVICES: ICD-10-PCS | Mod: S$GLB,,, | Performed by: PSYCHIATRY & NEUROLOGY

## 2019-12-04 PROCEDURE — 90792 PSYCH DIAG EVAL W/MED SRVCS: CPT | Mod: S$GLB,,, | Performed by: PSYCHIATRY & NEUROLOGY

## 2019-12-04 PROCEDURE — 99999 PR PBB SHADOW E&M-EST. PATIENT-LVL III: ICD-10-PCS | Mod: PBBFAC,,, | Performed by: PSYCHIATRY & NEUROLOGY
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
## 2019-12-04 NOTE — Clinical Note
No psych c/i to surgery- Just a heads up: I'm not sure if this is specific to certain insurances, but this is the 2nd pt in 2 wks who had eval with NEEMA Flores, PhD and was required to then get REeval'd due to credentialing concerns. Not sure how to get clarification about that to avoid surgical delays and pt frustration with process?.... Thanks, aw

## 2019-12-05 ENCOUNTER — TELEPHONE (OUTPATIENT)
Dept: PHARMACY | Facility: CLINIC | Age: 54
End: 2019-12-05

## 2019-12-13 ENCOUNTER — PATIENT MESSAGE (OUTPATIENT)
Dept: BARIATRICS | Facility: CLINIC | Age: 54
End: 2019-12-13

## 2019-12-13 DIAGNOSIS — G43.719 INTRACTABLE CHRONIC MIGRAINE WITHOUT AURA AND WITHOUT STATUS MIGRAINOSUS: ICD-10-CM

## 2019-12-15 RX ORDER — SUMATRIPTAN 50 MG/1
TABLET, FILM COATED ORAL
Qty: 9 TABLET | Refills: 3 | OUTPATIENT
Start: 2019-12-15 | End: 2019-12-19 | Stop reason: SDUPTHER

## 2019-12-15 RX ORDER — BUTALBITAL, ACETAMINOPHEN, CAFFEINE AND CODEINE PHOSPHATE 300; 50; 40; 30 MG/1; MG/1; MG/1; MG/1
1 CAPSULE ORAL EVERY 6 HOURS PRN
Qty: 12 CAPSULE | Refills: 3 | Status: SHIPPED | OUTPATIENT
Start: 2019-12-15 | End: 2019-12-19 | Stop reason: SDUPTHER

## 2019-12-18 ENCOUNTER — PATIENT MESSAGE (OUTPATIENT)
Dept: NEUROLOGY | Facility: CLINIC | Age: 54
End: 2019-12-18

## 2019-12-19 ENCOUNTER — PATIENT MESSAGE (OUTPATIENT)
Dept: NEUROLOGY | Facility: CLINIC | Age: 54
End: 2019-12-19

## 2019-12-19 DIAGNOSIS — G43.719 INTRACTABLE CHRONIC MIGRAINE WITHOUT AURA AND WITHOUT STATUS MIGRAINOSUS: ICD-10-CM

## 2019-12-19 RX ORDER — SUMATRIPTAN 50 MG/1
TABLET, FILM COATED ORAL
Qty: 9 TABLET | Refills: 3 | Status: SHIPPED | OUTPATIENT
Start: 2019-12-19

## 2019-12-19 RX ORDER — BUTALBITAL, ACETAMINOPHEN, CAFFEINE AND CODEINE PHOSPHATE 300; 50; 40; 30 MG/1; MG/1; MG/1; MG/1
1 CAPSULE ORAL EVERY 6 HOURS PRN
Qty: 12 CAPSULE | Refills: 3 | Status: SHIPPED | OUTPATIENT
Start: 2019-12-19

## 2019-12-26 ENCOUNTER — OFFICE VISIT (OUTPATIENT)
Dept: BARIATRICS | Facility: CLINIC | Age: 54
End: 2019-12-26
Payer: COMMERCIAL

## 2019-12-26 VITALS
SYSTOLIC BLOOD PRESSURE: 136 MMHG | HEIGHT: 65 IN | RESPIRATION RATE: 16 BRPM | TEMPERATURE: 98 F | HEART RATE: 84 BPM | WEIGHT: 216.38 LBS | BODY MASS INDEX: 36.05 KG/M2 | DIASTOLIC BLOOD PRESSURE: 73 MMHG

## 2019-12-26 DIAGNOSIS — E66.01 MORBID OBESITY: Primary | ICD-10-CM

## 2019-12-26 DIAGNOSIS — G47.33 OSA (OBSTRUCTIVE SLEEP APNEA): ICD-10-CM

## 2019-12-26 DIAGNOSIS — E66.01 CLASS 2 SEVERE OBESITY DUE TO EXCESS CALORIES WITH SERIOUS COMORBIDITY AND BODY MASS INDEX (BMI) OF 35.0 TO 35.9 IN ADULT: ICD-10-CM

## 2019-12-26 PROCEDURE — 3008F BODY MASS INDEX DOCD: CPT | Mod: CPTII,S$GLB,, | Performed by: SURGERY

## 2019-12-26 PROCEDURE — 3008F PR BODY MASS INDEX (BMI) DOCUMENTED: ICD-10-PCS | Mod: CPTII,S$GLB,, | Performed by: SURGERY

## 2019-12-26 PROCEDURE — 99999 PR PBB SHADOW E&M-EST. PATIENT-LVL III: ICD-10-PCS | Mod: PBBFAC,,, | Performed by: SURGERY

## 2019-12-26 PROCEDURE — 99213 PR OFFICE/OUTPT VISIT, EST, LEVL III, 20-29 MIN: ICD-10-PCS | Mod: S$GLB,,, | Performed by: SURGERY

## 2019-12-26 PROCEDURE — 99999 PR PBB SHADOW E&M-EST. PATIENT-LVL III: CPT | Mod: PBBFAC,,, | Performed by: SURGERY

## 2019-12-26 PROCEDURE — 99213 OFFICE O/P EST LOW 20 MIN: CPT | Mod: S$GLB,,, | Performed by: SURGERY

## 2019-12-26 NOTE — PROGRESS NOTES
Medically Supervised Weight Loss Documentation    Date of Visit: 12/26/2019    Patient: Marsha Choi    Current Weight: 216  Current BMI: Body mass index is 36.01 kg/m².  Weight Change: +5 pounds     Last Weight: 213    Beginning Weight: 211      Vitals:   Vitals:    12/26/19 1437   BP: 136/73   Pulse: 84   Resp: 16   Temp: 98.3 °F (36.8 °C)       Comorbidities:   Past Medical History:   Diagnosis Date    Allergy     Chest pain     negative stress test and heart cath    Headache     Kidney stone     ALMA (obstructive sleep apnea)     Seasonal allergies        Medications:  Current Outpatient Medications on File Prior to Visit   Medication Sig Dispense Refill    butalbital-acetaminop-caf-cod -58-30 mg Cap Take 1 capsule by mouth every 6 (six) hours as needed. 12 capsule 3    cetirizine (ZYRTEC) 10 MG tablet Take 10 mg by mouth once daily.      galcanezumab-gnlm (EMGALITY PEN) 120 mg/mL PnIj Inject 120 mg into the skin every 28 days. 1 mL 11    sumatriptan (IMITREX) 50 MG tablet TAKE 1 TABLET AT ONSET OF HEADACHE MAY REPEAT IN 2 HOURS TO A MAX OF 3 PER DAY 2 DAYS PER WEEK 9 tablet 3     No current facility-administered medications on file prior to visit.          Body comp:  Fat Percent:  48.5 %  Fat Mass:  105 lb  FFM:  111.4 lb  TBW: 80.4 lb  TBW %:  37.2 %  BMR: 1584 kcal      Diet Education Discussed:    Had cheats on deepika, but not thanks giving    Breakfast:  2 eggs borjas  Lunch:  Protein shake  Dinner:  Occasional cheats but usually a meat and vegetable - such as atkins meal    Exercise/Activity Discussed:    Walking and doing bow flex    Behavior or Diet Goals for this patient:    Limit cheats  Exercise routine 20 minutes 3 times per week    Labs- tested positive for h pylori in the past and has been treated   EKG  UGI - small sliding hiatal hernia, small amount of reflux  dietary consult-done  psych consult - done  Seminar  Tobacco cessastion - stopped august 31- started walking - none  since  colonoscopy- done     I will obtain the following clearances prior to surgery: Cardiology- done       : I met with the patient for 15 minutes and counseled her for over 50% of that time

## 2020-01-06 ENCOUNTER — OFFICE VISIT (OUTPATIENT)
Dept: NEUROLOGY | Facility: CLINIC | Age: 55
End: 2020-01-06
Payer: COMMERCIAL

## 2020-01-06 VITALS
WEIGHT: 225 LBS | RESPIRATION RATE: 16 BRPM | HEART RATE: 72 BPM | DIASTOLIC BLOOD PRESSURE: 85 MMHG | BODY MASS INDEX: 37.49 KG/M2 | HEIGHT: 65 IN | SYSTOLIC BLOOD PRESSURE: 132 MMHG

## 2020-01-06 DIAGNOSIS — G47.33 OSA (OBSTRUCTIVE SLEEP APNEA): ICD-10-CM

## 2020-01-06 DIAGNOSIS — E66.9 OBESITY (BMI 30.0-34.9): ICD-10-CM

## 2020-01-06 DIAGNOSIS — M54.81 BILATERAL OCCIPITAL NEURALGIA: ICD-10-CM

## 2020-01-06 PROCEDURE — 99999 PR PBB SHADOW E&M-EST. PATIENT-LVL III: ICD-10-PCS | Mod: PBBFAC,,, | Performed by: PSYCHIATRY & NEUROLOGY

## 2020-01-06 PROCEDURE — 99214 PR OFFICE/OUTPT VISIT, EST, LEVL IV, 30-39 MIN: ICD-10-PCS | Mod: S$GLB,,, | Performed by: PSYCHIATRY & NEUROLOGY

## 2020-01-06 PROCEDURE — 3008F PR BODY MASS INDEX (BMI) DOCUMENTED: ICD-10-PCS | Mod: CPTII,S$GLB,, | Performed by: PSYCHIATRY & NEUROLOGY

## 2020-01-06 PROCEDURE — 99999 PR PBB SHADOW E&M-EST. PATIENT-LVL III: CPT | Mod: PBBFAC,,, | Performed by: PSYCHIATRY & NEUROLOGY

## 2020-01-06 PROCEDURE — 3008F BODY MASS INDEX DOCD: CPT | Mod: CPTII,S$GLB,, | Performed by: PSYCHIATRY & NEUROLOGY

## 2020-01-06 PROCEDURE — 99214 OFFICE O/P EST MOD 30 MIN: CPT | Mod: S$GLB,,, | Performed by: PSYCHIATRY & NEUROLOGY

## 2020-01-06 NOTE — PROGRESS NOTES
Subjective:       Patient ID: Marsha Khalil is a 53 y.o. female.    Chief Complaint: Headaches     INTERVAL HISTORY 1/6/2020  The patient presents for follow up. She is still doing well on Emgality, she reports only one migraine since on Emgality plus tobacco cessation. She is scheduled for gastric sleeve in March.  She is very please with her current protocol.       INTERVAL HISTORY 7/18/2019  The patient presents for follow up. She has finally found relief with Emgality, she reports up to 14 days headache free which is unprecedented for her. She is averaging 2 headache attacks per month which are severe but short lasting and respond well to medication. She is very please with her current protocol.     INTERVAL HISTORY 5/2/2019  The patient is a 52 y/o female presenting for follow up regarding migraine with and without aura. She has not been doing well. She initially responded to Aimovig but it quickly lost efficacy. She used 140 mg dose. She reports a variety of new symptoms which are unexplained including poor balance, falls, cognitive deficit, anger bursts at work. She is concerned because her sister was givena diagnosis of early stages of Alzheimer's disease, she is in the late 50's. Her mother had Alzheimer's as well, diagnosed in the early 60's. She is also very depressed. She cries during most of the interview.    INTERVAL HISTORY  The patient presents for follow up regarding migraine headache with and without aura. She was doing very well since placed on prevention with Lamictal, but for the last 4 weeks, she has had a constant, severe headache, not responding to Imitrex. Stress is a big trigger. Her daughter was just diagnosed with a pineal cyst and a small acoustic schwanoma. Otherwise information below is still accurate and current.    HPI  The patient is a pleasant 50 y/o female presenting with chief complaint of headache. They started after the birth of her last child 18 years ago. The location  varies and migrates from one side to another, posterior or anterior or pancephalic. She has never taken preventive medication or migraine specific medications. She treats with tylenol or NSAIDs with partial relief. The duration varied from 3 to 4 hours to 3 to 4 days. She is more worried about episodes consisting of scintillating scotoma, described as a typical aura followed by a severe headache. She has had an ECHO in the past but not with bubble contrast. She has had a recent MRI of the brain which was negative, revealing microischemic changes as expected for her age. Her father suffered from MS. She has undergone extensive work up for MS with negative results.  Please see details of headache characteristics below.  Headache questionnaire     1. When did your Headaches start?                         1999 with the birth of last child        2. Where are your headaches located?                        Both sides around the back of neck         3. Your headache's characteristics:                        Excruciating, Pressure, Throbbing, Pounding, Sharp        4. How long does the headache last?                        days        5. How often does the headache occur?                        0 varies         6. Are your headaches preceded or accompanied by other symptoms? yes                        If yes, please describe.  Blurry vision kaleidoscope affect , peripheral vision disappears         7. Does the headache awaken you at night? yes                        If so, how often? Once or twice per month            8. Please bev the word that best describes your headache's intensity:                         severe        9. Using a scale of 1 through 10, with 0 = no pain and 10 = the worst pain:                        What score is your headache now? 2                        What score is your headache at its worst? 10                        What score is your headache at its best? 2           10. Possible associated  headache symptoms:  [x]  Sensitivity to light                                      [] Dizziness                                        [] Nasal or sinus pressure/ pain                        [x] Sensitivity to noise                                     [] Vertigo                                            [x] Problems with concentration  [] Sensitivity to smells             [] Ringing in ears                     [] Problems with memory                                            [x] Blurred vision                                 [x] Irritability                                         [x] Problems with task completion   [] Double vision                                 [] Anger                                  [x]  Problems with relaxation  [] Loss of appetite                                         [] Anxiety                                           [x] Neck tightness, Neck pain  [x] Nausea                                                                 [] Nasal congestion  [] Vomiting                                                                       11. Headache improving factors:  [] Sleep                                  [x] Heat  [x] Darkness                                        [x] Ice  [] Local pressure                     [] Menses (period)  [x] Massage                                        [x] Medications:  advil and tylenol        12. Headache worsening factors:   [] Fatigue                     [] Sneezing                                        [x] Changes in Weather  [x] Light             [x] Bending Over           [x] Stress  [] Noise            [] Ovulation                                        [] Multiple Sclerosis Flare-Up  [] Smells                      [] Menses                                          [] Food   [x] Coughing                  [] Alcohol        13. Number of caffeinated drinks per day: 2        14. Number of diet drinks per day:  0     Review of Systems   Constitutional:  Positive for fatigue. Negative for activity change, appetite change and fever.   HENT: Negative for congestion, dental problem, hearing loss, sinus pressure, tinnitus, trouble swallowing and voice change.    Eyes: Negative for photophobia, pain, redness and visual disturbance (visual aura).   Respiratory: Negative for cough, chest tightness and shortness of breath.    Cardiovascular: Negative for chest pain, palpitations and leg swelling.   Gastrointestinal: Positive for nausea. Negative for abdominal pain, blood in stool and vomiting.   Endocrine: Positive for cold intolerance. Negative for heat intolerance.   Genitourinary: Negative for difficulty urinating, frequency, menstrual problem and urgency.   Musculoskeletal: Negative for arthralgias, back pain, gait problem, joint swelling, myalgias, neck pain and neck stiffness.   Skin: Negative.    Neurological: Negative for dizziness, tremors, seizures, syncope, facial asymmetry, speech difficulty, weakness, light-headedness, numbness and headaches.   Hematological: Negative for adenopathy. Does not bruise/bleed easily.   Psychiatric/Behavioral: Negative for agitation, behavioral problems, confusion, decreased concentration, self-injury, sleep disturbance and suicidal ideas. The patient is not nervous/anxious and is not hyperactive.          Past Medical History:   Diagnosis Date    Allergy     Chest pain     negative stress test and heart cath    Headache     ALMA (obstructive sleep apnea)     Seasonal allergies      Past Surgical History:   Procedure Laterality Date    abscess removal from right side of neck      APPENDECTOMY      BREAST SURGERY Left     lumpectomy, benign    CARDIAC CATHETERIZATION      part of evaluation for chest pain, negative     SECTION      x1    ENDOMETRIAL ABLATION      TUBAL LIGATION       Family History   Problem Relation Age of Onset    Multiple sclerosis Father     Diabetes Father     Heart disease Father 50      CAD    Diabetes Mother     Cancer Mother      breast    Heart disease Mother 50     CAD    Thyroid disease Daughter      hypothyroidism    Transient ischemic attack Sister      Social History     Social History    Marital status: Significant Other     Spouse name: N/A    Number of children: N/A    Years of education: N/A     Occupational History    Not on file.     Social History Main Topics    Smoking status: Current Every Day Smoker     Packs/day: 1.00     Years: 30.00     Types: Cigarettes    Smokeless tobacco: Never Used    Alcohol use No    Drug use: No    Sexual activity: Yes     Partners: Male     Other Topics Concern    Not on file     Social History Narrative    No narrative on file     Review of patient's allergies indicates:  No Known Allergies    Current Outpatient Prescriptions:     butalbital-aspirin-caffeine -40 mg (FIORINAL) -40 mg Cap, Take 1 capsule by mouth every 6 (six) hours as needed., Disp: 12 capsule, Rfl: 2    cetirizine (ZYRTEC) 10 MG tablet, Take 10 mg by mouth once daily., Disp: , Rfl:     ibuprofen (ADVIL,MOTRIN) 200 MG tablet, Take 600 mg by mouth 2 (two) times daily as needed for Pain., Disp: , Rfl:     lamotrigine (LAMICTAL) 25 MG tablet, Take 1 tablet daily for 1 week, then take 1 po BID for 1 week, then take 2 tablets BID for initial goal of 50 mg BID, Disp: 120 tablet, Rfl: 11    sumatriptan (IMITREX) 50 MG tablet, Take 1 at onset of headache, may repeat in 2 hours to a max of 3 per day, 2 days per week, Disp: 9 tablet, Rfl: 3      Objective:      Vitals:    01/06/20 0816   BP: 132/85   Pulse: 72   Resp: 16     Body mass index is 37.44 kg/m².    Physical Exam    Constitutional:   She appears well-developed and well-nourished. She is well groomed    HENT:    Head: Atraumatic, oral and nasal mucosa intact  Eyes: Conjunctivae and EOM are normal. Pupils are equal, round, and reactive to light OU  Neck: Neck supple. No thyromegaly  present  Cardiovascular: Normal rate and normal heart sounds  No murmur heard  Pulmonary/Chest: Effort normal and breath sounds normal  Musculoskeletal: Normal range of motion. No joint stiffness. No vertebral point tenderness  Skin: Skin is warm and dry  Psychiatric: Normal mood and affect     Neuro exam:    Mental status: MOCA 27/30  Awake, attentive, Alert, oriented to self, place, year and month  Language function is intact    Cranial Nerves:  Smell was not formally evaluated  Cranial Nerves II - XII: intact  Pursuits were smooth, normal saccades, no nystagmus OU  Funduscopic exam - disc were flat and pink, no exudates or hemorrhages OU  Motor - facial movement was symmetrical and normal     Palate moved well and was symmetrical with normal palatal and oral sensation  Tongue movements were full    Coordination:     Rapid alternating movements and rapid finger tapping - normal bilaterally  Finger to nose - normal and symmetric bilaterally   Heel to shin test - normal and symmetric bilaterally   Arm roll - smooth and symmetric   No intentional or positional tremor.     Motor:  Normal muscle bulk and symmetry. No fasciculations were noted    No pronator drift  Strength 5/5 bilaterally     Reflexes:  Tendon reflexes were 2 + at biceps, triceps, brachioradialis, patellar, and Achilles bilaterally  No clonus was noted     Sensory: Intact to light touch, pin prick in all extremities.     Gait: Romberg with positive swaying without fall. Gait with elements of astasia-abasia    Review of Data:    Lab Results   Component Value Date    BNP 10 01/03/2019     10/14/2019    K 4.6 10/14/2019    MG 2.0 10/14/2019     10/14/2019    CO2 27 10/14/2019    BUN 13 10/14/2019    CREATININE 0.8 10/14/2019     10/14/2019    HGBA1C 5.6 10/14/2019    AST 14 10/14/2019    ALT 20 10/14/2019    ALBUMIN 4.1 10/14/2019    PROT 7.3 10/14/2019    BILITOT 0.8 10/14/2019    CHOL 196 10/14/2019    HDL 43 10/14/2019    LDLCALC  130.2 10/14/2019    TRIG 114 10/14/2019       Lab Results   Component Value Date    WBC 8.20 10/14/2019    HGB 13.9 10/14/2019    HCT 40.9 10/14/2019    MCV 89 10/14/2019     (H) 10/14/2019       Lab Results   Component Value Date    TSH 0.725 10/14/2019         Results for orders placed or performed during the hospital encounter of 05/24/19   MRI Brain W WO Contrast    Narrative    EXAMINATION:  MRI BRAIN W WO CONTRAST    CLINICAL HISTORY:  imbalance; Somnolence    TECHNIQUE:  Multiplanar multisequence MR imaging of the brain was performed before and after the administration of 9 mL Gadavist intravenous contrast.    COMPARISON:  None    FINDINGS:  There is no restricted diffusion.  The craniocervical junction is within normal limits in appearance.  There is normal vascular flow void in major, central intracranial vessels.  Ventricles, sulci, fissures are unremarkable in appearance for the patient's age.  There are just very few small scattered foci of increased T2/FLAIR signal in the white matter consistent with very mild microvascular ischemic change not appearing out of proportion for the patient's age and with no corresponding diffusion positivity or abnormal contrast enhancement.  No abnormal contrast enhancement or intracranial mass or mass effect is seen.  Orbital contents are unremarkable in appearance.  There is mild mucosal thickening in ethmoid sinuses.  Mastoids appear well pneumatized      Impression    Slight mucosal thickening ethmoid sinuses.  Very mild/minimal white matter changes suggesting minimal microvascular ischemic change.      Electronically signed by: Za Skinner MD  Date:    05/24/2019  Time:    10:43   Results for orders placed or performed during the hospital encounter of 05/12/17   MRA Brain    Narrative    Technique:Noncontrast 3-D time-of-flight MRA head with review of axial source images and maximum intensity projection reconstructions      Comparison:None available at this  institution     Findings:The quality of the study is good. The visualized cervical, petrous, cavernous and supraclinoid internal carotid arteries are unremarkable. The bilateral anterior cerebral and anterior communicating arteries are unremarkable. The bilateral middle cerebral arteries are patent and symmetric without evidence for stenosis.    The vertebral arteries are codominant. No vertebral artery stenosis or dissection is seen. The visualized inferior and superior cerebellar arteries are unremarkable. The basilar artery is unremarkable. The bilateral posterior cerebral arteries are unremarkable. There is a small, patent right posterior communicating artery. The left posterior communicating artery appears to be small or absent.    No intracranial aneurysm is identified.    Impression    1. Normal MRA of the brain. Specifically, no identifiable intracranial aneurysm is seen. There is reportedly a previous diagnosis of a 1 mm aneurysm which would be difficult to reliably diagnose by MRA.    Epic notification system activated.      Electronically signed by: Elizabeth Thayer MD  Date:     05/12/17  Time:    16:58          Assessment and Plan   Migraine with aura manifesting with scintillating scotoma. Excellent response to Emgality while Aimovig was poorly tolerated  Continue Emgality, if it stops working for her, will introduce Botox  For the acute attack, sumatriptan as first line and limited Fiorinal for rescue.  Seasonal allergies  ALMA  I have discussed the side effects of the medications prescribed and the patient acknowledges understanding    Counseling:  More than 50% of the 25 minute encounter was spent face to face counseling the patient regarding current status and future plan of care as well as side of the medications. All questions were answered to patient's satisfaction    RTC end of April or early May after her gastric sleeve (March 2020)        Florentin Fischer M.D  Medical Director, Headache and  Facial Pain  [facility]

## 2020-01-10 ENCOUNTER — TELEPHONE (OUTPATIENT)
Dept: PHARMACY | Facility: CLINIC | Age: 55
End: 2020-01-10

## 2020-01-22 ENCOUNTER — OFFICE VISIT (OUTPATIENT)
Dept: FAMILY MEDICINE | Facility: CLINIC | Age: 55
End: 2020-01-22
Payer: COMMERCIAL

## 2020-01-22 VITALS
DIASTOLIC BLOOD PRESSURE: 80 MMHG | SYSTOLIC BLOOD PRESSURE: 128 MMHG | OXYGEN SATURATION: 96 % | TEMPERATURE: 98 F | WEIGHT: 225.75 LBS | BODY MASS INDEX: 37.61 KG/M2 | HEIGHT: 65 IN | HEART RATE: 90 BPM

## 2020-01-22 DIAGNOSIS — J10.1 INFLUENZA B: Primary | ICD-10-CM

## 2020-01-22 PROCEDURE — 3008F BODY MASS INDEX DOCD: CPT | Mod: CPTII,S$GLB,, | Performed by: FAMILY MEDICINE

## 2020-01-22 PROCEDURE — 99214 PR OFFICE/OUTPT VISIT, EST, LEVL IV, 30-39 MIN: ICD-10-PCS | Mod: S$GLB,,, | Performed by: FAMILY MEDICINE

## 2020-01-22 PROCEDURE — 99214 OFFICE O/P EST MOD 30 MIN: CPT | Mod: S$GLB,,, | Performed by: FAMILY MEDICINE

## 2020-01-22 PROCEDURE — 99999 PR PBB SHADOW E&M-EST. PATIENT-LVL III: ICD-10-PCS | Mod: PBBFAC,,, | Performed by: FAMILY MEDICINE

## 2020-01-22 PROCEDURE — 3008F PR BODY MASS INDEX (BMI) DOCUMENTED: ICD-10-PCS | Mod: CPTII,S$GLB,, | Performed by: FAMILY MEDICINE

## 2020-01-22 PROCEDURE — 99999 PR PBB SHADOW E&M-EST. PATIENT-LVL III: CPT | Mod: PBBFAC,,, | Performed by: FAMILY MEDICINE

## 2020-01-22 RX ORDER — OSELTAMIVIR PHOSPHATE 75 MG/1
75 CAPSULE ORAL 2 TIMES DAILY
Qty: 10 CAPSULE | Refills: 0 | Status: SHIPPED | OUTPATIENT
Start: 2020-01-22 | End: 2020-01-27

## 2020-01-22 NOTE — LETTER
January 22, 2020    Employer               [employer] [facility]  [address] [address]  Phone: [phone]  Fax: [phone]   January 22, 2020     Patient: Marsha Choi   YOB: 1965   Date of Visit: 1/22/2020       To Whom it May Concern:    Marsha Choi was seen in my clinic on 1/22/2020 for influenza. She may physically return to work on 1/27/2020; meanwhile she is cleared to do remote; tele-work.    Please excuse her from any classes or work missed.    If you have any questions or concerns, please don't hesitate to call.    Sincerely,         Narciso Torrez MD

## 2020-01-22 NOTE — PROGRESS NOTES
Subjective:       Patient ID: Marsha Choi is a 54 y.o. female.    Chief Complaint: Cough and Sore Throat    New to me patient here for UC visit.  Her grandchild was Dx with Flu B two days ago.    Influenza   This is a new problem. The current episode started yesterday. The problem occurs constantly. The problem has been gradually worsening. Associated symptoms include arthralgias, chills, congestion, coughing, headaches and a sore throat. Pertinent negatives include no abdominal pain, chest pain, fever, nausea or rash.     Review of Systems   Constitutional: Positive for chills. Negative for fever.   HENT: Positive for congestion and sore throat.    Respiratory: Positive for cough. Negative for shortness of breath.    Cardiovascular: Negative for chest pain.   Gastrointestinal: Negative for abdominal pain and nausea.   Musculoskeletal: Positive for arthralgias.   Skin: Negative for rash.   Neurological: Positive for headaches.   All other systems reviewed and are negative.      Objective:      Physical Exam   Constitutional: She appears well-developed. No distress.   HENT:   Right Ear: Tympanic membrane normal. Tympanic membrane is not erythematous.   Left Ear: Tympanic membrane normal. Tympanic membrane is not erythematous.   Nose: Mucosal edema present. Right sinus exhibits no maxillary sinus tenderness. Left sinus exhibits no maxillary sinus tenderness.   Mouth/Throat: Posterior oropharyngeal erythema present.   Neck: Neck supple.   Cardiovascular: Normal rate and regular rhythm.   No murmur heard.  Pulmonary/Chest: Effort normal and breath sounds normal. She has no wheezes. She has no rales.   Lymphadenopathy:     She has no cervical adenopathy.   Skin: Skin is warm and dry.       Assessment:       1. Influenza B        Plan:       Influenza B  -     oseltamivir (TAMIFLU) 75 MG capsule; Take 1 capsule (75 mg total) by mouth 2 (two) times daily. for 10 doses  Dispense: 10 capsule; Refill: 0      Rest; push  fluids intake.

## 2020-01-29 ENCOUNTER — OFFICE VISIT (OUTPATIENT)
Dept: BARIATRICS | Facility: CLINIC | Age: 55
End: 2020-01-29
Payer: COMMERCIAL

## 2020-01-29 VITALS — HEIGHT: 65 IN | RESPIRATION RATE: 17 BRPM | BODY MASS INDEX: 37.02 KG/M2 | WEIGHT: 222.19 LBS

## 2020-01-29 DIAGNOSIS — E66.01 MORBID OBESITY: Primary | ICD-10-CM

## 2020-01-29 DIAGNOSIS — E66.01 CLASS 2 SEVERE OBESITY DUE TO EXCESS CALORIES WITH SERIOUS COMORBIDITY AND BODY MASS INDEX (BMI) OF 35.0 TO 35.9 IN ADULT: ICD-10-CM

## 2020-01-29 DIAGNOSIS — G47.33 OSA (OBSTRUCTIVE SLEEP APNEA): ICD-10-CM

## 2020-01-29 PROCEDURE — 99999 PR PBB SHADOW E&M-EST. PATIENT-LVL III: CPT | Mod: PBBFAC,,, | Performed by: SURGERY

## 2020-01-29 PROCEDURE — 99213 PR OFFICE/OUTPT VISIT, EST, LEVL III, 20-29 MIN: ICD-10-PCS | Mod: S$GLB,,, | Performed by: SURGERY

## 2020-01-29 PROCEDURE — 99999 PR PBB SHADOW E&M-EST. PATIENT-LVL III: ICD-10-PCS | Mod: PBBFAC,,, | Performed by: SURGERY

## 2020-01-29 PROCEDURE — 3008F BODY MASS INDEX DOCD: CPT | Mod: CPTII,S$GLB,, | Performed by: SURGERY

## 2020-01-29 PROCEDURE — 99213 OFFICE O/P EST LOW 20 MIN: CPT | Mod: S$GLB,,, | Performed by: SURGERY

## 2020-01-29 PROCEDURE — 3008F PR BODY MASS INDEX (BMI) DOCUMENTED: ICD-10-PCS | Mod: CPTII,S$GLB,, | Performed by: SURGERY

## 2020-01-29 NOTE — PROGRESS NOTES
Medically Supervised Weight Loss Documentation    Date of Visit: 01/29/2020    Patient: Marsha Choi    Current Weight: 222  Current BMI: Body mass index is 36.98 kg/m².  Weight Change: -11 pounds    Last Weight: 216    Beginning Weight: 211      Vitals:   Vitals:    01/29/20 1521   Resp: 17       Comorbidities:   Past Medical History:   Diagnosis Date    Allergy     Chest pain     negative stress test and heart cath    Headache     Kidney stone     ALMA (obstructive sleep apnea)     Seasonal allergies        Medications:  Current Outpatient Medications on File Prior to Visit   Medication Sig Dispense Refill    butalbital-acetaminop-caf-cod -71-30 mg Cap Take 1 capsule by mouth every 6 (six) hours as needed. 12 capsule 3    cetirizine (ZYRTEC) 10 MG tablet Take 10 mg by mouth once daily.      galcanezumab-gnlm (EMGALITY PEN) 120 mg/mL PnIj Inject 120 mg into the skin every 28 days. 1 mL 11    sumatriptan (IMITREX) 50 MG tablet TAKE 1 TABLET AT ONSET OF HEADACHE MAY REPEAT IN 2 HOURS TO A MAX OF 3 PER DAY 2 DAYS PER WEEK 9 tablet 3     No current facility-administered medications on file prior to visit.          Body comp:  Fat Percent:  50.3 %  Fat Mass:  111.8 lb  FFM:  110.4 lb  TBW: 80 lb  TBW %:  36 %  BMR: 1584 kcal      Diet Education Discussed:    Had some cheats over holiday    Breakfast:  2 eggs and slice of borjas  Lunch:  Slim fast protein shake  Dinner:  Sometimes slim fast with meat and vegetable  Snack: none     Exercise/Activity Discussed:    Just bought home gym -     Behavior or Diet Goals for this patient:    Stay on track with the dieting avoid cheating.  Work on portion control.  She should start using her home gym equipment at least 20 min 3 times per week.  I think overall she is doing well and is prepared for surgery.    Planning march 2 for surgery     Labs- tested positive for h pylori in the past and has been treated   EKG  UGI - small sliding hiatal hernia, small amount  of reflux  dietary consult-done  psych consult - done  Seminar  Tobacco cessastion - stopped august 31- started walking - none since  colonoscopy- done     I will obtain the following clearances prior to surgery: Cardiology- done    : I met with the patient for 15 minutes and counseled her for over 50% of that time

## 2020-02-05 ENCOUNTER — TELEPHONE (OUTPATIENT)
Dept: PHARMACY | Facility: CLINIC | Age: 55
End: 2020-02-05

## 2020-02-13 ENCOUNTER — OFFICE VISIT (OUTPATIENT)
Dept: BARIATRICS | Facility: CLINIC | Age: 55
End: 2020-02-13
Payer: COMMERCIAL

## 2020-02-13 VITALS
DIASTOLIC BLOOD PRESSURE: 83 MMHG | SYSTOLIC BLOOD PRESSURE: 147 MMHG | HEART RATE: 87 BPM | HEIGHT: 65 IN | RESPIRATION RATE: 16 BRPM | WEIGHT: 220.81 LBS | TEMPERATURE: 98 F | BODY MASS INDEX: 36.79 KG/M2

## 2020-02-13 DIAGNOSIS — G47.33 OSA (OBSTRUCTIVE SLEEP APNEA): ICD-10-CM

## 2020-02-13 DIAGNOSIS — M51.36 DDD (DEGENERATIVE DISC DISEASE), LUMBAR: ICD-10-CM

## 2020-02-13 DIAGNOSIS — E66.01 CLASS 2 SEVERE OBESITY DUE TO EXCESS CALORIES WITH SERIOUS COMORBIDITY AND BODY MASS INDEX (BMI) OF 35.0 TO 35.9 IN ADULT: ICD-10-CM

## 2020-02-13 DIAGNOSIS — E66.01 MORBID OBESITY: Primary | ICD-10-CM

## 2020-02-13 PROCEDURE — 99999 PR PBB SHADOW E&M-EST. PATIENT-LVL IV: CPT | Mod: PBBFAC,,, | Performed by: SURGERY

## 2020-02-13 PROCEDURE — 99214 PR OFFICE/OUTPT VISIT, EST, LEVL IV, 30-39 MIN: ICD-10-PCS | Mod: S$GLB,,, | Performed by: SURGERY

## 2020-02-13 PROCEDURE — 99214 OFFICE O/P EST MOD 30 MIN: CPT | Mod: S$GLB,,, | Performed by: SURGERY

## 2020-02-13 PROCEDURE — 3008F PR BODY MASS INDEX (BMI) DOCUMENTED: ICD-10-PCS | Mod: CPTII,S$GLB,, | Performed by: SURGERY

## 2020-02-13 PROCEDURE — 3008F BODY MASS INDEX DOCD: CPT | Mod: CPTII,S$GLB,, | Performed by: SURGERY

## 2020-02-13 PROCEDURE — 99999 PR PBB SHADOW E&M-EST. PATIENT-LVL IV: ICD-10-PCS | Mod: PBBFAC,,, | Performed by: SURGERY

## 2020-02-13 NOTE — PROGRESS NOTES
Follow up    SUBJECTIVE:     Chief Complaint   Patient presents with    Obesity       History of Present Illness:    Patient is a 54 y.o. female who is referred for evaluation of surgical treatment of morbid obesity. Her Body mass index is 36.74 kg/m².   She has known comorbidities of obstructive sleep apnea. She has not attended the bariatric seminar and is most interested in gastric sleeve surgery.    Review of patient's allergies indicates:   Allergen Reactions    Morphine Hallucinations    Topiramate      Marked drowsiness         Current Outpatient Medications   Medication Sig Dispense Refill    butalbital-acetaminop-caf-cod -61-30 mg Cap Take 1 capsule by mouth every 6 (six) hours as needed. 12 capsule 3    cetirizine (ZYRTEC) 10 MG tablet Take 10 mg by mouth once daily.      galcanezumab-gnlm (EMGALITY PEN) 120 mg/mL PnIj Inject 120 mg into the skin every 28 days. 1 mL 11    sumatriptan (IMITREX) 50 MG tablet TAKE 1 TABLET AT ONSET OF HEADACHE MAY REPEAT IN 2 HOURS TO A MAX OF 3 PER DAY 2 DAYS PER WEEK (Patient not taking: Reported on 2020) 9 tablet 3     No current facility-administered medications for this visit.        Past Medical History:   Diagnosis Date    Allergy     Chest pain     negative stress test and heart cath    Headache     Kidney stone     ALMA (obstructive sleep apnea)     Seasonal allergies      Past Surgical History:   Procedure Laterality Date    abscess removal from right side of neck      APPENDECTOMY      BREAST SURGERY Left     lumpectomy, benign    CARDIAC CATHETERIZATION      part of evaluation for chest pain, negative     SECTION      x1    COLONOSCOPY N/A 10/21/2019    Procedure: COLONOSCOPY;  Surgeon: Tereso Hoffman MD;  Location: [facility];  Service: Endoscopy;  Laterality: N/A;    ENDOMETRIAL ABLATION      TUBAL LIGATION      UPPER GASTROINTESTINAL ENDOSCOPY  prior to      Family History   Problem Relation Age of Onset     Multiple sclerosis Father     Diabetes Father     Heart disease Father 50        CAD    Diabetes Mother     Cancer Mother         breast    Heart disease Mother 50        CAD    Thyroid disease Daughter         hypothyroidism    Transient ischemic attack Sister     Colon cancer Neg Hx     Colon polyps Neg Hx     Crohn's disease Neg Hx     Esophageal cancer Neg Hx     Stomach cancer Neg Hx     Ulcerative colitis Neg Hx      Social History     Tobacco Use    Smoking status: Former Smoker     Packs/day: 0.75     Years: 30.00     Pack years: 22.50     Types: Cigarettes    Smokeless tobacco: Never Used    Tobacco comment: quit on 8/31/2019   Substance Use Topics    Alcohol use: No    Drug use: No        Review of Systems:  Review of Systems   Constitutional: Positive for fatigue and unexpected weight change. Negative for chills, diaphoresis and fever.   HENT: Negative for congestion, sinus pressure, sneezing, sore throat, tinnitus and voice change.    Eyes: Negative for pain, redness and itching.   Respiratory: Negative for apnea, cough, choking, chest tightness, shortness of breath, wheezing and stridor.    Cardiovascular: Negative for chest pain, palpitations and leg swelling.   Gastrointestinal: Negative for abdominal pain, anal bleeding, constipation, diarrhea, nausea and vomiting.   Endocrine: Negative for cold intolerance and heat intolerance.   Genitourinary: Negative for difficulty urinating and dysuria.   Musculoskeletal: Negative for arthralgias, back pain and gait problem.   Skin: Negative for rash and wound.   Allergic/Immunologic: Negative for environmental allergies and food allergies.   Neurological: Negative for dizziness, light-headedness and headaches.   Hematological: Negative for adenopathy. Does not bruise/bleed easily.   Psychiatric/Behavioral: Negative for agitation and confusion.       OBJECTIVE:     Vital Signs (Most Recent)  Temp: 98.3 °F (36.8 °C) (02/13/20 1531)  Pulse: 87  "(02/13/20 1531)  Resp: 16 (02/13/20 1531)  BP: (!) 147/83 (02/13/20 1531)  5' 5" (1.651 m)  100.2 kg (220 lb 12.8 oz)     Body comp:  Fat Percent:  50.0 %  Fat Mass:  110.4 lb  FFM:  110.4 lb  TBW: 90 lb  TBW %:  36.2 %  BMR: 1581 kcal        Physical Exam:  Physical Exam   Constitutional: She is oriented to person, place, and time. She appears well-developed and well-nourished. No distress.   HENT:   Head: Normocephalic and atraumatic.   Mouth/Throat: No oropharyngeal exudate.   Eyes: Pupils are equal, round, and reactive to light. Conjunctivae and EOM are normal. No scleral icterus.   Neck: Normal range of motion. Neck supple. No JVD present. No tracheal deviation present. No thyromegaly present.   Cardiovascular: Normal rate, regular rhythm and normal heart sounds. Exam reveals no gallop and no friction rub.   No murmur heard.  Pulmonary/Chest: Effort normal and breath sounds normal. No stridor. No respiratory distress. She has no wheezes. She has no rales. She exhibits no tenderness.   Abdominal: Soft. Bowel sounds are normal. She exhibits no distension and no mass. There is no tenderness. There is no rebound and no guarding.       Musculoskeletal: Normal range of motion. She exhibits no edema or tenderness.   Lymphadenopathy:     She has no cervical adenopathy.   Neurological: She is alert and oriented to person, place, and time. No cranial nerve deficit.   Skin: Skin is warm and dry. No rash noted. She is not diaphoretic. No erythema.   Psychiatric: She has a normal mood and affect. Her behavior is normal.   Nursing note and vitals reviewed.      ASSESSMENT/PLAN:        Labs- tested positive for h pylori in the past and has been treated   EKG  UGI - small sliding hiatal hernia, small amount of reflux  dietary consult-done  psych consult - done  Seminar  Tobacco cessastion - stopped august 31- started walking - none since  colonoscopy- done     I will obtain the following clearances prior to " surgery: Cardiology- done     1. Morbid obesity     2. Class 2 severe obesity due to excess calories with serious comorbidity and body mass index (BMI) of 35.0 to 35.9 in adult     3. ALMA (obstructive sleep apnea)     4. DDD (degenerative disc disease), lumbar         Plan:  Marsha Choi has morbid obesity as their Body mass index is 36.74 kg/m².  She would benefit from weight loss surgery and has chosen gastric sleeve surgery as the preferred procedure. She understands that this is a tool and lifestyle change will be necessary to keep weight off. I went over possible complications of the chosen surgery with the patient and she is agreeable to surgery.    She has been instructed to start the pre operative diet.    She surgical date is March 2, 2019       The patient has been taught the post operative diet and understands that she will need to stay on this diet to prevent complication.  They are aware of the post operative follow up and return to see me after surgery to treat/prevent/identify any complications.  she has been advised to attend support groups post operatively.

## 2020-02-13 NOTE — LETTER
February 13, 2020      [facility]  [address] [place] [address]  Phone: [phone]  Fax: [phone]       Patient: Marsha Choi   YOB: 1965  Date of Visit: 02/13/2020    To Whom It May Concern:    Amadou Choi  was at [facility] on 02/13/2020. She is currently scheduled for a procedure on Monday 3/2/20. She will need to be out of work for 4 weeks- 2 weeks home rest, 2 weeks tele work from home. She may return to work/school on Monday 4/2/2020 with no restrictions. If you have any questions or concerns, or if I can be of further assistance, please do not hesitate to contact me.     Sincerely,    Erma Garcia MD

## 2020-02-13 NOTE — H&P (VIEW-ONLY)
Follow up    SUBJECTIVE:     Chief Complaint   Patient presents with    Obesity       History of Present Illness:    Patient is a 54 y.o. female who is referred for evaluation of surgical treatment of morbid obesity. Her Body mass index is 36.74 kg/m².   She has known comorbidities of obstructive sleep apnea. She has not attended the bariatric seminar and is most interested in gastric sleeve surgery.    Review of patient's allergies indicates:   Allergen Reactions    Morphine Hallucinations    Topiramate      Marked drowsiness         Current Outpatient Medications   Medication Sig Dispense Refill    butalbital-acetaminop-caf-cod -57-30 mg Cap Take 1 capsule by mouth every 6 (six) hours as needed. 12 capsule 3    cetirizine (ZYRTEC) 10 MG tablet Take 10 mg by mouth once daily.      galcanezumab-gnlm (EMGALITY PEN) 120 mg/mL PnIj Inject 120 mg into the skin every 28 days. 1 mL 11    sumatriptan (IMITREX) 50 MG tablet TAKE 1 TABLET AT ONSET OF HEADACHE MAY REPEAT IN 2 HOURS TO A MAX OF 3 PER DAY 2 DAYS PER WEEK (Patient not taking: Reported on 2020) 9 tablet 3     No current facility-administered medications for this visit.        Past Medical History:   Diagnosis Date    Allergy     Chest pain     negative stress test and heart cath    Headache     Kidney stone     ALMA (obstructive sleep apnea)     Seasonal allergies      Past Surgical History:   Procedure Laterality Date    abscess removal from right side of neck      APPENDECTOMY      BREAST SURGERY Left     lumpectomy, benign    CARDIAC CATHETERIZATION      part of evaluation for chest pain, negative     SECTION      x1    COLONOSCOPY N/A 10/21/2019    Procedure: COLONOSCOPY;  Surgeon: Tereso Hoffman MD;  Location: [facility];  Service: Endoscopy;  Laterality: N/A;    ENDOMETRIAL ABLATION      TUBAL LIGATION      UPPER GASTROINTESTINAL ENDOSCOPY  prior to      Family History   Problem Relation Age of Onset     Multiple sclerosis Father     Diabetes Father     Heart disease Father 50        CAD    Diabetes Mother     Cancer Mother         breast    Heart disease Mother 50        CAD    Thyroid disease Daughter         hypothyroidism    Transient ischemic attack Sister     Colon cancer Neg Hx     Colon polyps Neg Hx     Crohn's disease Neg Hx     Esophageal cancer Neg Hx     Stomach cancer Neg Hx     Ulcerative colitis Neg Hx      Social History     Tobacco Use    Smoking status: Former Smoker     Packs/day: 0.75     Years: 30.00     Pack years: 22.50     Types: Cigarettes    Smokeless tobacco: Never Used    Tobacco comment: quit on 8/31/2019   Substance Use Topics    Alcohol use: No    Drug use: No        Review of Systems:  Review of Systems   Constitutional: Positive for fatigue and unexpected weight change. Negative for chills, diaphoresis and fever.   HENT: Negative for congestion, sinus pressure, sneezing, sore throat, tinnitus and voice change.    Eyes: Negative for pain, redness and itching.   Respiratory: Negative for apnea, cough, choking, chest tightness, shortness of breath, wheezing and stridor.    Cardiovascular: Negative for chest pain, palpitations and leg swelling.   Gastrointestinal: Negative for abdominal pain, anal bleeding, constipation, diarrhea, nausea and vomiting.   Endocrine: Negative for cold intolerance and heat intolerance.   Genitourinary: Negative for difficulty urinating and dysuria.   Musculoskeletal: Negative for arthralgias, back pain and gait problem.   Skin: Negative for rash and wound.   Allergic/Immunologic: Negative for environmental allergies and food allergies.   Neurological: Negative for dizziness, light-headedness and headaches.   Hematological: Negative for adenopathy. Does not bruise/bleed easily.   Psychiatric/Behavioral: Negative for agitation and confusion.       OBJECTIVE:     Vital Signs (Most Recent)  Temp: 98.3 °F (36.8 °C) (02/13/20 1531)  Pulse: 87  "(02/13/20 1531)  Resp: 16 (02/13/20 1531)  BP: (!) 147/83 (02/13/20 1531)  5' 5" (1.651 m)  100.2 kg (220 lb 12.8 oz)     Body comp:  Fat Percent:  50.0 %  Fat Mass:  110.4 lb  FFM:  110.4 lb  TBW: 90 lb  TBW %:  36.2 %  BMR: 1581 kcal        Physical Exam:  Physical Exam   Constitutional: She is oriented to person, place, and time. She appears well-developed and well-nourished. No distress.   HENT:   Head: Normocephalic and atraumatic.   Mouth/Throat: No oropharyngeal exudate.   Eyes: Pupils are equal, round, and reactive to light. Conjunctivae and EOM are normal. No scleral icterus.   Neck: Normal range of motion. Neck supple. No JVD present. No tracheal deviation present. No thyromegaly present.   Cardiovascular: Normal rate, regular rhythm and normal heart sounds. Exam reveals no gallop and no friction rub.   No murmur heard.  Pulmonary/Chest: Effort normal and breath sounds normal. No stridor. No respiratory distress. She has no wheezes. She has no rales. She exhibits no tenderness.   Abdominal: Soft. Bowel sounds are normal. She exhibits no distension and no mass. There is no tenderness. There is no rebound and no guarding.       Musculoskeletal: Normal range of motion. She exhibits no edema or tenderness.   Lymphadenopathy:     She has no cervical adenopathy.   Neurological: She is alert and oriented to person, place, and time. No cranial nerve deficit.   Skin: Skin is warm and dry. No rash noted. She is not diaphoretic. No erythema.   Psychiatric: She has a normal mood and affect. Her behavior is normal.   Nursing note and vitals reviewed.      ASSESSMENT/PLAN:        Labs- tested positive for h pylori in the past and has been treated   EKG  UGI - small sliding hiatal hernia, small amount of reflux  dietary consult-done  psych consult - done  Seminar  Tobacco cessastion - stopped august 31- started walking - none since  colonoscopy- done     I will obtain the following clearances prior to " surgery: Cardiology- done     1. Morbid obesity     2. Class 2 severe obesity due to excess calories with serious comorbidity and body mass index (BMI) of 35.0 to 35.9 in adult     3. ALMA (obstructive sleep apnea)     4. DDD (degenerative disc disease), lumbar         Plan:  Marsha Choi has morbid obesity as their Body mass index is 36.74 kg/m².  She would benefit from weight loss surgery and has chosen gastric sleeve surgery as the preferred procedure. She understands that this is a tool and lifestyle change will be necessary to keep weight off. I went over possible complications of the chosen surgery with the patient and she is agreeable to surgery.    She has been instructed to start the pre operative diet.    She surgical date is March 2, 2019       The patient has been taught the post operative diet and understands that she will need to stay on this diet to prevent complication.  They are aware of the post operative follow up and return to see me after surgery to treat/prevent/identify any complications.  she has been advised to attend support groups post operatively.

## 2020-02-14 ENCOUNTER — PATIENT MESSAGE (OUTPATIENT)
Dept: BARIATRICS | Facility: CLINIC | Age: 55
End: 2020-02-14

## 2020-02-19 ENCOUNTER — TELEPHONE (OUTPATIENT)
Dept: BARIATRICS | Facility: CLINIC | Age: 55
End: 2020-02-19

## 2020-02-19 NOTE — TELEPHONE ENCOUNTER
called pt, lm advising pt that insurance requires sleep study and use of cpap records. pt stated she had it done in [place]. pt will need to contact office. pt states that she refuses the cpap and will get notes faxed to office.

## 2020-02-20 ENCOUNTER — PATIENT MESSAGE (OUTPATIENT)
Dept: BARIATRICS | Facility: CLINIC | Age: 55
End: 2020-02-20

## 2020-02-21 ENCOUNTER — PATIENT MESSAGE (OUTPATIENT)
Dept: BARIATRICS | Facility: CLINIC | Age: 55
End: 2020-02-21

## 2020-02-27 ENCOUNTER — HOSPITAL ENCOUNTER (OUTPATIENT)
Dept: PREADMISSION TESTING | Facility: HOSPITAL | Age: 55
Discharge: HOME | End: 2020-02-27
Attending: SURGERY
Payer: COMMERCIAL

## 2020-02-27 DIAGNOSIS — E66.01 MORBID OBESITY: Primary | ICD-10-CM

## 2020-02-27 LAB
ANION GAP SERPL CALC-SCNC: 11 MMOL/L (ref 8–16)
BASOPHILS # BLD AUTO: 0.06 K/UL (ref 0–0.2)
BASOPHILS NFR BLD: 0.6 % (ref 0–1.9)
BUN SERPL-MCNC: 12 MG/DL (ref 6–20)
CALCIUM SERPL-MCNC: 10.4 MG/DL (ref 8.7–10.5)
CHLORIDE SERPL-SCNC: 104 MMOL/L (ref 95–110)
CO2 SERPL-SCNC: 26 MMOL/L (ref 23–29)
CREAT SERPL-MCNC: 0.8 MG/DL (ref 0.5–1.4)
DIFFERENTIAL METHOD: ABNORMAL
EOSINOPHIL # BLD AUTO: 0.3 K/UL (ref 0–0.5)
EOSINOPHIL NFR BLD: 2.5 % (ref 0–8)
ERYTHROCYTE [DISTWIDTH] IN BLOOD BY AUTOMATED COUNT: 13.2 % (ref 11.5–14.5)
EST. GFR  (AFRICAN AMERICAN): >60 ML/MIN/1.73 M^2
EST. GFR  (NON AFRICAN AMERICAN): >60 ML/MIN/1.73 M^2
GLUCOSE SERPL-MCNC: 75 MG/DL (ref 70–110)
HCT VFR BLD AUTO: 43.4 % (ref 37–48.5)
HGB BLD-MCNC: 13.8 G/DL (ref 12–16)
IMM GRANULOCYTES # BLD AUTO: 0.04 K/UL (ref 0–0.04)
LYMPHOCYTES # BLD AUTO: 4.8 K/UL (ref 1–4.8)
LYMPHOCYTES NFR BLD: 48.8 % (ref 18–48)
MCH RBC QN AUTO: 28.6 PG (ref 27–31)
MCHC RBC AUTO-ENTMCNC: 31.8 G/DL (ref 32–36)
MCV RBC AUTO: 90 FL (ref 82–98)
MONOCYTES # BLD AUTO: 0.6 K/UL (ref 0.3–1)
MONOCYTES NFR BLD: 5.8 % (ref 4–15)
NEUTROPHILS # BLD AUTO: 4.2 K/UL (ref 1.8–7.7)
NEUTROPHILS NFR BLD: 41.9 % (ref 38–73)
NRBC BLD-RTO: 0 /100 WBC
PLATELET # BLD AUTO: 410 K/UL (ref 150–350)
PMV BLD AUTO: 9.8 FL (ref 9.2–12.9)
POTASSIUM SERPL-SCNC: 4.3 MMOL/L (ref 3.5–5.1)
RBC # BLD AUTO: 4.83 M/UL (ref 4–5.4)
SODIUM SERPL-SCNC: 141 MMOL/L (ref 136–145)
WBC # BLD AUTO: 9.92 K/UL (ref 3.9–12.7)

## 2020-02-27 PROCEDURE — 80048 BASIC METABOLIC PNL TOTAL CA: CPT

## 2020-02-27 PROCEDURE — 85025 COMPLETE CBC W/AUTO DIFF WBC: CPT

## 2020-02-27 PROCEDURE — 99900104 DSU ONLY-NO CHARGE-EA ADD'L HR (STAT)

## 2020-02-27 PROCEDURE — 36415 COLL VENOUS BLD VENIPUNCTURE: CPT

## 2020-02-27 PROCEDURE — 99900103 DSU ONLY-NO CHARGE-INITIAL HR (STAT)

## 2020-02-27 NOTE — DISCHARGE INSTRUCTIONS

## 2020-02-28 DIAGNOSIS — E66.01 MORBID OBESITY: Primary | ICD-10-CM

## 2020-02-28 RX ORDER — HEPARIN SODIUM 5000 [USP'U]/ML
5000 INJECTION, SOLUTION INTRAVENOUS; SUBCUTANEOUS
Status: CANCELLED | OUTPATIENT
Start: 2020-02-28

## 2020-02-28 RX ORDER — FAMOTIDINE 10 MG/ML
20 INJECTION INTRAVENOUS
Status: CANCELLED | OUTPATIENT
Start: 2020-02-28

## 2020-03-02 ENCOUNTER — ANESTHESIA (OUTPATIENT)
Dept: SURGERY | Facility: HOSPITAL | Age: 55
DRG: 621 | End: 2020-03-02
Payer: COMMERCIAL

## 2020-03-02 ENCOUNTER — ANESTHESIA EVENT (OUTPATIENT)
Dept: SURGERY | Facility: HOSPITAL | Age: 55
DRG: 621 | End: 2020-03-02
Payer: COMMERCIAL

## 2020-03-02 ENCOUNTER — HOSPITAL ENCOUNTER (INPATIENT)
Facility: HOSPITAL | Age: 55
LOS: 1 days | Discharge: HOME | DRG: 621 | End: 2020-03-03
Attending: SURGERY | Admitting: SURGERY
Payer: COMMERCIAL

## 2020-03-02 DIAGNOSIS — E66.01 MORBID OBESITY: Primary | ICD-10-CM

## 2020-03-02 PROCEDURE — 94799 UNLISTED PULMONARY SVC/PX: CPT

## 2020-03-02 PROCEDURE — S0028 INJECTION, FAMOTIDINE, 20 MG: HCPCS | Performed by: SURGERY

## 2020-03-02 PROCEDURE — 43775 PR LAP, GAST RESTRICT PROC, LONGITUDINAL GASTRECTOMY: ICD-10-PCS | Mod: ,,, | Performed by: SURGERY

## 2020-03-02 PROCEDURE — 37000009 HC ANESTHESIA EA ADD 15 MINS: Performed by: SURGERY

## 2020-03-02 PROCEDURE — 63600175 PHARM REV CODE 636 W HCPCS: Performed by: SURGERY

## 2020-03-02 PROCEDURE — 25000003 PHARM REV CODE 250: Performed by: SURGERY

## 2020-03-02 PROCEDURE — 88307 PR  SURG PATH,LEVEL V: ICD-10-PCS | Mod: 26,,, | Performed by: PATHOLOGY

## 2020-03-02 PROCEDURE — D9220A PRA ANESTHESIA: Mod: CRNA,,, | Performed by: REGISTERED NURSE

## 2020-03-02 PROCEDURE — 36000710: Performed by: SURGERY

## 2020-03-02 PROCEDURE — 99900103 DSU ONLY-NO CHARGE-INITIAL HR (STAT): Performed by: SURGERY

## 2020-03-02 PROCEDURE — 25000003 PHARM REV CODE 250: Performed by: ANESTHESIOLOGY

## 2020-03-02 PROCEDURE — D9220A PRA ANESTHESIA: Mod: ANES,,, | Performed by: ANESTHESIOLOGY

## 2020-03-02 PROCEDURE — 94761 N-INVAS EAR/PLS OXIMETRY MLT: CPT

## 2020-03-02 PROCEDURE — 63600175 PHARM REV CODE 636 W HCPCS: Performed by: NURSE PRACTITIONER

## 2020-03-02 PROCEDURE — 63600175 PHARM REV CODE 636 W HCPCS: Performed by: REGISTERED NURSE

## 2020-03-02 PROCEDURE — 25000003 PHARM REV CODE 250: Performed by: REGISTERED NURSE

## 2020-03-02 PROCEDURE — 43775 LAP SLEEVE GASTRECTOMY: CPT | Mod: ,,, | Performed by: SURGERY

## 2020-03-02 PROCEDURE — 88307 TISSUE EXAM BY PATHOLOGIST: CPT | Performed by: PATHOLOGY

## 2020-03-02 PROCEDURE — D9220A PRA ANESTHESIA: ICD-10-PCS | Mod: CRNA,,, | Performed by: REGISTERED NURSE

## 2020-03-02 PROCEDURE — 99900104 DSU ONLY-NO CHARGE-EA ADD'L HR (STAT): Performed by: SURGERY

## 2020-03-02 PROCEDURE — 88307 TISSUE EXAM BY PATHOLOGIST: CPT | Mod: 26,,, | Performed by: PATHOLOGY

## 2020-03-02 PROCEDURE — D9220A PRA ANESTHESIA: ICD-10-PCS | Mod: ANES,,, | Performed by: ANESTHESIOLOGY

## 2020-03-02 PROCEDURE — 25000003 PHARM REV CODE 250: Performed by: NURSE PRACTITIONER

## 2020-03-02 PROCEDURE — 71000039 HC RECOVERY, EACH ADD'L HOUR: Performed by: SURGERY

## 2020-03-02 PROCEDURE — 71000033 HC RECOVERY, INTIAL HOUR: Performed by: SURGERY

## 2020-03-02 PROCEDURE — 27000221 HC OXYGEN, UP TO 24 HOURS

## 2020-03-02 PROCEDURE — 12000002 HC ACUTE/MED SURGE SEMI-PRIVATE ROOM

## 2020-03-02 PROCEDURE — 99900035 HC TECH TIME PER 15 MIN (STAT)

## 2020-03-02 PROCEDURE — 27201423 OPTIME MED/SURG SUP & DEVICES STERILE SUPPLY: Performed by: SURGERY

## 2020-03-02 PROCEDURE — 36000711: Performed by: SURGERY

## 2020-03-02 PROCEDURE — 63600175 PHARM REV CODE 636 W HCPCS: Performed by: ANESTHESIOLOGY

## 2020-03-02 PROCEDURE — 37000008 HC ANESTHESIA 1ST 15 MINUTES: Performed by: SURGERY

## 2020-03-02 RX ORDER — PHENYLEPHRINE HYDROCHLORIDE 10 MG/ML
INJECTION INTRAVENOUS
Status: DISCONTINUED | OUTPATIENT
Start: 2020-03-02 | End: 2020-03-02

## 2020-03-02 RX ORDER — SCOLOPAMINE TRANSDERMAL SYSTEM 1 MG/1
PATCH, EXTENDED RELEASE TRANSDERMAL
Status: DISPENSED
Start: 2020-03-02 | End: 2020-03-03

## 2020-03-02 RX ORDER — FENTANYL CITRATE 50 UG/ML
INJECTION, SOLUTION INTRAMUSCULAR; INTRAVENOUS
Status: DISCONTINUED | OUTPATIENT
Start: 2020-03-02 | End: 2020-03-02

## 2020-03-02 RX ORDER — FAMOTIDINE 10 MG/ML
20 INJECTION INTRAVENOUS EVERY 12 HOURS
Status: DISCONTINUED | OUTPATIENT
Start: 2020-03-02 | End: 2020-03-03 | Stop reason: HOSPADM

## 2020-03-02 RX ORDER — HYDROMORPHONE HYDROCHLORIDE 2 MG/ML
0.2 INJECTION, SOLUTION INTRAMUSCULAR; INTRAVENOUS; SUBCUTANEOUS EVERY 5 MIN PRN
Status: DISCONTINUED | OUTPATIENT
Start: 2020-03-02 | End: 2020-03-02 | Stop reason: HOSPADM

## 2020-03-02 RX ORDER — LANOLIN ALCOHOL/MO/W.PET/CERES
800 CREAM (GRAM) TOPICAL
Status: DISCONTINUED | OUTPATIENT
Start: 2020-03-02 | End: 2020-03-03 | Stop reason: HOSPADM

## 2020-03-02 RX ORDER — SODIUM CHLORIDE 0.9 % (FLUSH) 0.9 %
10 SYRINGE (ML) INJECTION
Status: DISCONTINUED | OUTPATIENT
Start: 2020-03-02 | End: 2020-03-02

## 2020-03-02 RX ORDER — BUPIVACAINE HYDROCHLORIDE AND EPINEPHRINE 2.5; 5 MG/ML; UG/ML
INJECTION, SOLUTION EPIDURAL; INFILTRATION; INTRACAUDAL; PERINEURAL
Status: DISCONTINUED | OUTPATIENT
Start: 2020-03-02 | End: 2020-03-02 | Stop reason: HOSPADM

## 2020-03-02 RX ORDER — ACETAMINOPHEN 500 MG
1000 TABLET ORAL EVERY 6 HOURS PRN
Status: DISCONTINUED | OUTPATIENT
Start: 2020-03-02 | End: 2020-03-03 | Stop reason: HOSPADM

## 2020-03-02 RX ORDER — PROPOFOL 10 MG/ML
VIAL (ML) INTRAVENOUS
Status: DISCONTINUED | OUTPATIENT
Start: 2020-03-02 | End: 2020-03-02

## 2020-03-02 RX ORDER — NEOSTIGMINE METHYLSULFATE 1 MG/ML
INJECTION, SOLUTION INTRAVENOUS
Status: DISCONTINUED | OUTPATIENT
Start: 2020-03-02 | End: 2020-03-02

## 2020-03-02 RX ORDER — ONDANSETRON 2 MG/ML
8 INJECTION INTRAMUSCULAR; INTRAVENOUS EVERY 8 HOURS PRN
Status: DISCONTINUED | OUTPATIENT
Start: 2020-03-02 | End: 2020-03-03

## 2020-03-02 RX ORDER — POTASSIUM CHLORIDE 20 MEQ/15ML
60 SOLUTION ORAL
Status: DISCONTINUED | OUTPATIENT
Start: 2020-03-02 | End: 2020-03-03 | Stop reason: HOSPADM

## 2020-03-02 RX ORDER — ONDANSETRON 2 MG/ML
4 INJECTION INTRAMUSCULAR; INTRAVENOUS DAILY PRN
Status: DISCONTINUED | OUTPATIENT
Start: 2020-03-02 | End: 2020-03-02 | Stop reason: HOSPADM

## 2020-03-02 RX ORDER — ACETAMINOPHEN 10 MG/ML
INJECTION, SOLUTION INTRAVENOUS
Status: DISCONTINUED | OUTPATIENT
Start: 2020-03-02 | End: 2020-03-02

## 2020-03-02 RX ORDER — FAMOTIDINE 10 MG/ML
20 INJECTION INTRAVENOUS
Status: COMPLETED | OUTPATIENT
Start: 2020-03-02 | End: 2020-03-02

## 2020-03-02 RX ORDER — SUCCINYLCHOLINE CHLORIDE 20 MG/ML
INJECTION INTRAMUSCULAR; INTRAVENOUS
Status: DISCONTINUED | OUTPATIENT
Start: 2020-03-02 | End: 2020-03-02

## 2020-03-02 RX ORDER — MIDAZOLAM HYDROCHLORIDE 1 MG/ML
INJECTION, SOLUTION INTRAMUSCULAR; INTRAVENOUS
Status: DISCONTINUED | OUTPATIENT
Start: 2020-03-02 | End: 2020-03-02

## 2020-03-02 RX ORDER — LIDOCAINE HYDROCHLORIDE 10 MG/ML
1 INJECTION, SOLUTION EPIDURAL; INFILTRATION; INTRACAUDAL; PERINEURAL ONCE
Status: COMPLETED | OUTPATIENT
Start: 2020-03-02 | End: 2020-03-02

## 2020-03-02 RX ORDER — SODIUM CHLORIDE, SODIUM LACTATE, POTASSIUM CHLORIDE, CALCIUM CHLORIDE 600; 310; 30; 20 MG/100ML; MG/100ML; MG/100ML; MG/100ML
INJECTION, SOLUTION INTRAVENOUS CONTINUOUS
Status: DISCONTINUED | OUTPATIENT
Start: 2020-03-02 | End: 2020-03-03 | Stop reason: HOSPADM

## 2020-03-02 RX ORDER — DEXAMETHASONE SODIUM PHOSPHATE 4 MG/ML
INJECTION, SOLUTION INTRA-ARTICULAR; INTRALESIONAL; INTRAMUSCULAR; INTRAVENOUS; SOFT TISSUE
Status: DISCONTINUED | OUTPATIENT
Start: 2020-03-02 | End: 2020-03-02

## 2020-03-02 RX ORDER — KETAMINE HYDROCHLORIDE 100 MG/ML
INJECTION, SOLUTION INTRAMUSCULAR; INTRAVENOUS
Status: DISCONTINUED | OUTPATIENT
Start: 2020-03-02 | End: 2020-03-02

## 2020-03-02 RX ORDER — LIDOCAINE HYDROCHLORIDE 20 MG/ML
INJECTION INTRAVENOUS
Status: DISCONTINUED | OUTPATIENT
Start: 2020-03-02 | End: 2020-03-02

## 2020-03-02 RX ORDER — ENOXAPARIN SODIUM 100 MG/ML
40 INJECTION SUBCUTANEOUS DAILY
Status: DISCONTINUED | OUTPATIENT
Start: 2020-03-02 | End: 2020-03-03 | Stop reason: HOSPADM

## 2020-03-02 RX ORDER — IBUPROFEN 200 MG
24 TABLET ORAL
Status: DISCONTINUED | OUTPATIENT
Start: 2020-03-02 | End: 2020-03-03 | Stop reason: HOSPADM

## 2020-03-02 RX ORDER — GLYCOPYRROLATE 0.2 MG/ML
INJECTION INTRAMUSCULAR; INTRAVENOUS
Status: DISCONTINUED | OUTPATIENT
Start: 2020-03-02 | End: 2020-03-02

## 2020-03-02 RX ORDER — HYDROMORPHONE HYDROCHLORIDE 1 MG/ML
1 INJECTION, SOLUTION INTRAMUSCULAR; INTRAVENOUS; SUBCUTANEOUS EVERY 6 HOURS PRN
Status: DISCONTINUED | OUTPATIENT
Start: 2020-03-02 | End: 2020-03-03 | Stop reason: HOSPADM

## 2020-03-02 RX ORDER — SCOLOPAMINE TRANSDERMAL SYSTEM 1 MG/1
1 PATCH, EXTENDED RELEASE TRANSDERMAL
Status: DISCONTINUED | OUTPATIENT
Start: 2020-03-02 | End: 2020-03-02 | Stop reason: HOSPADM

## 2020-03-02 RX ORDER — GLUCAGON 1 MG
1 KIT INJECTION
Status: DISCONTINUED | OUTPATIENT
Start: 2020-03-02 | End: 2020-03-03 | Stop reason: HOSPADM

## 2020-03-02 RX ORDER — OXYCODONE HYDROCHLORIDE 5 MG/1
5 TABLET ORAL
Status: DISCONTINUED | OUTPATIENT
Start: 2020-03-02 | End: 2020-03-02 | Stop reason: HOSPADM

## 2020-03-02 RX ORDER — FENTANYL CITRATE 50 UG/ML
25 INJECTION, SOLUTION INTRAMUSCULAR; INTRAVENOUS EVERY 5 MIN PRN
Status: DISCONTINUED | OUTPATIENT
Start: 2020-03-02 | End: 2020-03-02 | Stop reason: HOSPADM

## 2020-03-02 RX ORDER — HEPARIN SODIUM 5000 [USP'U]/ML
5000 INJECTION, SOLUTION INTRAVENOUS; SUBCUTANEOUS
Status: COMPLETED | OUTPATIENT
Start: 2020-03-02 | End: 2020-03-02

## 2020-03-02 RX ORDER — DIPHENHYDRAMINE HYDROCHLORIDE 50 MG/ML
12.5 INJECTION INTRAMUSCULAR; INTRAVENOUS EVERY 4 HOURS PRN
Status: DISCONTINUED | OUTPATIENT
Start: 2020-03-02 | End: 2020-03-03 | Stop reason: HOSPADM

## 2020-03-02 RX ORDER — ROCURONIUM BROMIDE 10 MG/ML
INJECTION, SOLUTION INTRAVENOUS
Status: DISCONTINUED | OUTPATIENT
Start: 2020-03-02 | End: 2020-03-02

## 2020-03-02 RX ORDER — IBUPROFEN 200 MG
16 TABLET ORAL
Status: DISCONTINUED | OUTPATIENT
Start: 2020-03-02 | End: 2020-03-03 | Stop reason: HOSPADM

## 2020-03-02 RX ORDER — CEFAZOLIN SODIUM 2 G/50ML
2 SOLUTION INTRAVENOUS
Status: COMPLETED | OUTPATIENT
Start: 2020-03-02 | End: 2020-03-03

## 2020-03-02 RX ORDER — POTASSIUM CHLORIDE 20 MEQ/15ML
40 SOLUTION ORAL
Status: DISCONTINUED | OUTPATIENT
Start: 2020-03-02 | End: 2020-03-03 | Stop reason: HOSPADM

## 2020-03-02 RX ORDER — CEFAZOLIN SODIUM 2 G/50ML
2 SOLUTION INTRAVENOUS
Status: COMPLETED | OUTPATIENT
Start: 2020-03-02 | End: 2020-03-02

## 2020-03-02 RX ADMIN — SUCCINYLCHOLINE CHLORIDE 160 MG: 20 INJECTION, SOLUTION INTRAMUSCULAR; INTRAVENOUS; PARENTERAL at 01:03

## 2020-03-02 RX ADMIN — ROCURONIUM BROMIDE 5 MG: 10 INJECTION, SOLUTION INTRAVENOUS at 01:03

## 2020-03-02 RX ADMIN — LIDOCAINE HYDROCHLORIDE 100 MG: 20 INJECTION, SOLUTION INTRAVENOUS at 01:03

## 2020-03-02 RX ADMIN — FAMOTIDINE 20 MG: 10 INJECTION, SOLUTION INTRAVENOUS at 09:03

## 2020-03-02 RX ADMIN — CEFAZOLIN SODIUM 2 G: 2 SOLUTION INTRAVENOUS at 01:03

## 2020-03-02 RX ADMIN — SODIUM CHLORIDE, SODIUM GLUCONATE, SODIUM ACETATE, POTASSIUM CHLORIDE, MAGNESIUM CHLORIDE, SODIUM PHOSPHATE, DIBASIC, AND POTASSIUM PHOSPHATE: .53; .5; .37; .037; .03; .012; .00082 INJECTION, SOLUTION INTRAVENOUS at 01:03

## 2020-03-02 RX ADMIN — FENTANYL CITRATE 100 MCG: 50 INJECTION, SOLUTION INTRAMUSCULAR; INTRAVENOUS at 01:03

## 2020-03-02 RX ADMIN — CEFAZOLIN SODIUM 2 G: 2 SOLUTION INTRAVENOUS at 06:03

## 2020-03-02 RX ADMIN — PROPOFOL 200 MG: 10 INJECTION, EMULSION INTRAVENOUS at 01:03

## 2020-03-02 RX ADMIN — DEXAMETHASONE SODIUM PHOSPHATE 4 MG: 4 INJECTION, SOLUTION INTRAMUSCULAR; INTRAVENOUS at 01:03

## 2020-03-02 RX ADMIN — HYDROMORPHONE HYDROCHLORIDE 0.2 MG: 2 INJECTION INTRAMUSCULAR; INTRAVENOUS; SUBCUTANEOUS at 03:03

## 2020-03-02 RX ADMIN — KETAMINE HYDROCHLORIDE 30 MG: 100 INJECTION, SOLUTION, CONCENTRATE INTRAMUSCULAR; INTRAVENOUS at 01:03

## 2020-03-02 RX ADMIN — SCOPALAMINE 1 PATCH: 1 PATCH, EXTENDED RELEASE TRANSDERMAL at 12:03

## 2020-03-02 RX ADMIN — PHENYLEPHRINE HYDROCHLORIDE 100 MCG: 10 INJECTION INTRAVENOUS at 01:03

## 2020-03-02 RX ADMIN — ACETAMINOPHEN 1000 MG: 10 INJECTION, SOLUTION INTRAVENOUS at 01:03

## 2020-03-02 RX ADMIN — MIDAZOLAM 2 MG: 1 INJECTION INTRAMUSCULAR; INTRAVENOUS at 12:03

## 2020-03-02 RX ADMIN — ENOXAPARIN SODIUM 40 MG: 100 INJECTION SUBCUTANEOUS at 05:03

## 2020-03-02 RX ADMIN — SODIUM CHLORIDE, SODIUM GLUCONATE, SODIUM ACETATE, POTASSIUM CHLORIDE, MAGNESIUM CHLORIDE, SODIUM PHOSPHATE, DIBASIC, AND POTASSIUM PHOSPHATE: .53; .5; .37; .037; .03; .012; .00082 INJECTION, SOLUTION INTRAVENOUS at 11:03

## 2020-03-02 RX ADMIN — HEPARIN SODIUM 5000 UNITS: 5000 INJECTION, SOLUTION INTRAVENOUS; SUBCUTANEOUS at 11:03

## 2020-03-02 RX ADMIN — SODIUM CHLORIDE, SODIUM LACTATE, POTASSIUM CHLORIDE, AND CALCIUM CHLORIDE: .6; .31; .03; .02 INJECTION, SOLUTION INTRAVENOUS at 05:03

## 2020-03-02 RX ADMIN — GLYCOPYRROLATE 0.6 MG: 0.2 INJECTION, SOLUTION INTRAMUSCULAR; INTRAVENOUS at 02:03

## 2020-03-02 RX ADMIN — GLYCOPYRROLATE 0.2 MG: 0.2 INJECTION, SOLUTION INTRAMUSCULAR; INTRAVENOUS at 12:03

## 2020-03-02 RX ADMIN — NEOSTIGMINE METHYLSULFATE 5 MG: 1 INJECTION INTRAVENOUS at 02:03

## 2020-03-02 RX ADMIN — LIDOCAINE HYDROCHLORIDE: 10 INJECTION, SOLUTION EPIDURAL; INFILTRATION; INTRACAUDAL; PERINEURAL at 11:03

## 2020-03-02 RX ADMIN — FAMOTIDINE 20 MG: 10 INJECTION INTRAVENOUS at 11:03

## 2020-03-02 RX ADMIN — PHENYLEPHRINE HYDROCHLORIDE 50 MCG: 10 INJECTION INTRAVENOUS at 01:03

## 2020-03-02 RX ADMIN — ROCURONIUM BROMIDE 25 MG: 10 INJECTION, SOLUTION INTRAVENOUS at 01:03

## 2020-03-02 RX ADMIN — ACETAMINOPHEN 1000 MG: 500 TABLET ORAL at 11:03

## 2020-03-02 RX ADMIN — OXYCODONE HYDROCHLORIDE 5 MG: 5 TABLET ORAL at 03:03

## 2020-03-02 RX ADMIN — HYDROMORPHONE HYDROCHLORIDE 1 MG: 1 INJECTION, SOLUTION INTRAMUSCULAR; INTRAVENOUS; SUBCUTANEOUS at 06:03

## 2020-03-02 NOTE — PROGRESS NOTES
Pharmacist Dose Adjustment Note    Marsha Choi is a 54 y.o. female being treated with the medication Lovenox.     Patient Data:    Vital Signs (Most Recent):  Temp: 97.9 °F (36.6 °C) (03/02/20 1039)  Pulse: 84 (03/02/20 1039)  Resp: 18 (03/02/20 1039)  BP: 133/79 (03/02/20 1039)  SpO2: 97 % (03/02/20 1039) Vital Signs (72h Range):  Temp:  [97.9 °F (36.6 °C)]   Pulse:  [84]   Resp:  [18]   BP: (133)/(79)   SpO2:  [97 %]      Recent Labs   Lab 02/27/20  1353   CREATININE 0.8     Serum creatinine: 0.8 mg/dL 02/27/20 1353  Estimated creatinine clearance: 94 mL/min    Lovenox 40 mg twice daily will be changed to Lovenox 40 mg daily per Crcl > 30 and BMI < 40.     Pharmacist's Name: Jose Galvan  Pharmacist's Extension: [phone]

## 2020-03-02 NOTE — HPI
Marsha Choi is a 54-year-old female with PMHx significant for morbid obesity, migraines, ALMA, and DDD.  Pt is S/P gastric sleeve with Dr. Garcia.  Pt was evaluated by Dr. Garcia preoperatively for surgical management of her morbid obesity.  Postoperatively, she reports 8/10 abdominal pain in PACU.  She denies any nausea, vomiting, fever, chills, chest pain, SOB, or other complaints at this time.  Other pertinent medical Hx as below:

## 2020-03-02 NOTE — H&P
[facility] Medicine  History & Physical    Patient Name: Marsha Choi  MRN: [id number]  Admission Date: 3/2/2020  Attending Physician: Nitish Velasco MD   Primary Care Provider: Lovely Garza MD         Patient information was obtained from patient and past medical records.     Subjective:     Principal Problem:Morbid obesity    Chief Complaint:   Chief Complaint   Patient presents with    Obesity        HPI: Marsha Choi is a 54-year-old female with PMHx significant for morbid obesity, migraines, ALMA, and DDD.  Pt is S/P gastric sleeve with Dr. Garcia.  Pt was evaluated by Dr. Garcia preoperatively for surgical management of her morbid obesity.  Postoperatively, she reports 8/10 abdominal pain in PACU.  She denies any nausea, vomiting, fever, chills, chest pain, SOB, or other complaints at this time.  Other pertinent medical Hx as below:    Past Medical History:   Diagnosis Date    Allergy     Chest pain     negative stress test and heart cath    Headache     Kidney stone     ALMA (obstructive sleep apnea)     Seasonal allergies        Past Surgical History:   Procedure Laterality Date    abscess removal from right side of neck      APPENDECTOMY      BREAST SURGERY Left     lumpectomy, benign    CARDIAC CATHETERIZATION      part of evaluation for chest pain, negative     SECTION      x1    COLONOSCOPY N/A 10/21/2019    Procedure: COLONOSCOPY;  Surgeon: Tereso Hoffman MD;  Location: [facility];  Service: Endoscopy;  Laterality: N/A;    ENDOMETRIAL ABLATION      TUBAL LIGATION      UPPER GASTROINTESTINAL ENDOSCOPY  prior to        Review of patient's allergies indicates:   Allergen Reactions    Morphine Hallucinations    Topiramate      Marked drowsiness         No current facility-administered medications on file prior to encounter.      Current Outpatient Medications on File Prior to Encounter   Medication Sig    multivitamin capsule Take 1  capsule by mouth once daily.    butalbital-acetaminop-caf-cod -40-30 mg Cap Take 1 capsule by mouth every 6 (six) hours as needed.    cetirizine (ZYRTEC) 10 MG tablet Take 10 mg by mouth once daily.    galcanezumab-gnlm (EMGALITY PEN) 120 mg/mL PnIj Inject 120 mg into the skin every 28 days.    sumatriptan (IMITREX) 50 MG tablet TAKE 1 TABLET AT ONSET OF HEADACHE MAY REPEAT IN 2 HOURS TO A MAX OF 3 PER DAY 2 DAYS PER WEEK (Patient not taking: Reported on 2/13/2020)     Family History     Problem Relation (Age of Onset)    Cancer Mother    Diabetes Father, Mother    Heart disease Father (50), Mother (50)    Multiple sclerosis Father    Thyroid disease Daughter    Transient ischemic attack Sister        Tobacco Use    Smoking status: Former Smoker     Packs/day: 0.75     Years: 30.00     Pack years: 22.50     Types: Cigarettes    Smokeless tobacco: Never Used    Tobacco comment: quit on 8/31/2019   Substance and Sexual Activity    Alcohol use: No    Drug use: No    Sexual activity: Yes     Partners: Male     Review of Systems   Constitutional: Negative for activity change, appetite change, chills, fatigue and fever.   HENT: Negative for congestion, postnasal drip, sore throat and trouble swallowing.    Eyes: Negative for photophobia and visual disturbance.   Respiratory: Negative for cough, shortness of breath and wheezing.    Cardiovascular: Negative for chest pain, palpitations and leg swelling.   Gastrointestinal: Positive for abdominal pain. Negative for abdominal distention, constipation, diarrhea, nausea and vomiting.   Genitourinary: Negative for difficulty urinating, dysuria, flank pain and hematuria.   Musculoskeletal: Negative for arthralgias and myalgias.   Skin: Negative for color change.   Neurological: Negative for dizziness, weakness and headaches.   Psychiatric/Behavioral: Negative for confusion. The patient is not nervous/anxious.      Objective:     Vital Signs (Most Recent):  Temp:  97.9 °F (36.6 °C) (03/02/20 1515)  Pulse: 84 (03/02/20 1520)  Resp: 17 (03/02/20 1520)  BP: (!) 163/74 (03/02/20 1520)  SpO2: (!) 94 % (03/02/20 1520) Vital Signs (24h Range):  Temp:  [97.9 °F (36.6 °C)] 97.9 °F (36.6 °C)  Pulse:  [78-90] 84  Resp:  [17-21] 17  SpO2:  [94 %-97 %] 94 %  BP: (133-196)/(74-88) 163/74     Weight: 99.8 kg (220 lb)  Body mass index is 36.61 kg/m².    Physical Exam   Constitutional: She is oriented to person, place, and time. She appears well-developed and well-nourished. No distress.   HENT:   Head: Normocephalic and atraumatic.   Eyes: Pupils are equal, round, and reactive to light. Conjunctivae and EOM are normal.   Neck: Normal range of motion. Neck supple. No thyromegaly present.   Cardiovascular: Normal rate, regular rhythm, normal heart sounds and intact distal pulses.   No murmur heard.  Pulmonary/Chest: Effort normal and breath sounds normal. No respiratory distress.   Abdominal: Soft. She exhibits no distension. There is tenderness (incisional).   Lap sites CDI.  Tinkling bowel sounds.   Musculoskeletal: Normal range of motion. She exhibits no edema or tenderness.   Neurological: She is alert and oriented to person, place, and time. No cranial nerve deficit.   Skin: Skin is warm and dry. Capillary refill takes less than 2 seconds. No erythema.   Psychiatric: She has a normal mood and affect. Her behavior is normal. Judgment and thought content normal.         CRANIAL NERVES     CN III, IV, VI   Pupils are equal, round, and reactive to light.  Extraocular motions are normal.        Significant Labs: I reviewed preoperative labs.        Assessment/Plan:     * Morbid obesity  Body mass index is 36.61 kg/m².  POD 0 s/p gastric sleeve  Tele-monitoring.  Continue to follow Dr. Garcia's recommendations.  Needs aggressive incentive spirometry.  Follow hemoglobin and hematocrit closely.  Pain control with IV narcotics and antiemetics as needed.  Observe fall precautions.  Gastric ulcer  prophylaxis with gastric acid suppressant.  Deep venous thrombosis prophylaxis with Lovenox 40 mg SQ daily.  See Orders.      ALMA (obstructive sleep apnea)  Pt does not use CPAP.  Monitor closely postoperatively.      Chronic nonintractable headache  Pt currently followed by Neurology and well managed on Emgality.  Continue as outpatient.       DDD (degenerative disc disease), lumbar  Chronic, pain control per surgeon's orders postoperatively.        VTE Risk Mitigation (From admission, onward)         Ordered     enoxaparin injection 40 mg  Daily      03/02/20 1422     Place SEBASTIEN hose  Until discontinued      03/02/20 1422     Place sequential compression device  Until discontinued      03/02/20 1422     IP VTE HIGH RISK PATIENT  Once      03/02/20 1422     Place sequential compression device  Until discontinued      03/02/20 1052     Place SEBASTIEN hose  Until discontinued      03/02/20 1052                   Yoana Villanueva NP  Department of Hospital Medicine   [facility]

## 2020-03-02 NOTE — ANESTHESIA PREPROCEDURE EVALUATION
03/02/2020  Marsha Choi is a 54 y.o., female.    Anesthesia Evaluation    I have reviewed the Patient Summary Reports.    I have reviewed the Nursing Notes.   I have reviewed the Medications.     Review of Systems  Anesthesia Hx:  Denies Family Hx of Anesthesia complications.   Denies Personal Hx of Anesthesia complications.   Pulmonary:   Sleep Apnea, CPAP    Renal/:   renal calculi    Musculoskeletal:   Arthritis     Neurological:   Headaches        Physical Exam  General:  Morbid Obesity, Malnutrition    Airway/Jaw/Neck:  Airway Findings: Mouth Opening: Normal Tongue: Normal  General Airway Assessment: Adult  Mallampati: III  Improves to II with phonation.  TM Distance: Normal, at least 6 cm  Jaw/Neck Findings:  Neck ROM: Normal ROM  Neck Findings:  Girth Increased     Eyes/Ears/Nose:  EYES/EARS/NOSE FINDINGS: Normal   Dental:  Dental Findings: Upper partial dentures   Chest/Lungs:  Chest/Lungs Findings: Normal Respiratory Rate     Heart/Vascular:  Heart Findings: Rate: Normal  Rhythm: Regular Rhythm  Sounds: Normal        Mental Status:  Mental Status Findings: Normal        Anesthesia Plan  Type of Anesthesia, risks & benefits discussed:  Anesthesia Type:  general  Patient's Preference:   Intra-op Monitoring Plan: standard ASA monitors  Intra-op Monitoring Plan Comments:   Post Op Pain Control Plan:   Post Op Pain Control Plan Comments:   Induction:   IV  Beta Blocker:  Patient is not currently on a Beta-Blocker (No further documentation required).       Informed Consent: Patient understands risks and agrees with Anesthesia plan.  Questions answered. Anesthesia consent signed with patient.  ASA Score: 3     Day of Surgery Review of History & Physical:    H&P update referred to the surgeon.         Ready For Surgery From Anesthesia Perspective.

## 2020-03-02 NOTE — RESPIRATORY THERAPY
03/02/20 1635   Patient Assessment/Suction   Level of Consciousness (AVPU) responds to voice   PRE-TX-O2   O2 Device (Oxygen Therapy) nasal cannula   $ Is the patient on Low Flow Oxygen? Yes   Flow (L/min) 3   SpO2 (!) 94 %   Pulse Oximetry Type Intermittent   $ Pulse Oximetry - Multiple Charge Pulse Oximetry - Multiple   Incentive Spirometer   $ Incentive Spirometer Charges postop instruction   Incentive Spirometer Predicted Level (mL) 2150   Administration (IS) mouthpiece;refused   Number of Repetitions (IS) 10   Level Incentive Spirometer (mL) 1250   Patient Tolerance (IS) good   ISU and instruct with IS X 10 breaths Q4

## 2020-03-02 NOTE — TRANSFER OF CARE
"Anesthesia Transfer of Care Note    Patient: Marsha Choi    Procedure(s) Performed: Procedure(s) (LRB):  GASTRECTOMY, SLEEVE, LAPAROSCOPIC (N/A)    Patient location: PACU    Anesthesia Type: general    Transport from OR: Transported from OR on 2-3 L/min O2 by NC with adequate spontaneous ventilation    Post pain: adequate analgesia    Post assessment: tolerated procedure well and no apparent anesthetic complications    Post vital signs: stable    Level of consciousness: sedated    Nausea/Vomiting: no nausea/vomiting    Complications: none    Transfer of care protocol was followed      Last vitals:   Visit Vitals  /79 (BP Location: Left arm, Patient Position: Lying)   Pulse 84   Temp 36.6 °C (97.9 °F) (Skin)   Resp 18   Ht 5' 5" (1.651 m)   Wt 99.8 kg (220 lb)   SpO2 97%   Breastfeeding? No   BMI 36.61 kg/m²     "

## 2020-03-02 NOTE — OP NOTE
Laparoscopic Sleeve Gastrectomy Procedure Note    Date of procedure:   03/02/2020    Indications: Morbid obesity unresponsive to medical treatment.    Pre-operative Diagnosis:   1. Morbid obesity      2. Class 2 severe obesity due to excess calories with serious comorbidity and body mass index (BMI) of 35.0 to 35.9 in adult      3. ALMA (obstructive sleep apnea)      4. DDD (degenerative disc disease), lumbar       Post-operative Diagnosis: Same    Surgeon: Erma Garcia MD    Assistants: none    No qualified resident was available to assist in this case    Anesthesia: General endotracheal anesthesia    ASA Class: 3    Procedure Details   The patient was seen in the Holding Room. The risks, benefits, complications, treatment options, and expected outcomes were discussed with the patient. The possibilities of reaction to medication, pulmonary aspiration, perforation of viscus, bleeding, recurrent infection, the need for additional procedures, failure to diagnose a condition, and creating a complication requiring transfusion or operation were discussed with the patient. The patient concurred with the proposed plan, giving informed consent. The site of surgery properly noted/marked. The patient was taken to Operating Room 6 identified as Marsha Choi and the procedure verified as Sleeve Gastrectomy. A Time Out was held and the above information confirmed.    Full general anesthesia was induced with orotracheal intubation. The patient was prepped and draped in a supine position. Appropriate antibiotics were given intravenously.    An EGD did not show any retained food in the stomach.    The 5 mm applied medical optiview was used to enter into the abdominal cavity under direct vision in the right upper quadrant. The abdomen was insufflated.    There were no untoward findings on diagnostic laparoscopy. Trocars were placed under direct vision in the following fashion: a 5 mm trocar in the lateral right (optiview  location) and left upper quadrant; a 12 mm trocar in the left and right upper quadrant; and a 12 mm trocar in the racquel umbilical area. The Blanca liver retractor was then placed through a high epigastric incision site and positioned to hold the liver.    The procedure was started by identifying an area approx. 5 cm proximal to the pylorus. The Harmonic was then used to take down the short gastrics up to the left anita which was identified and cleared.    The sleeve was started by using a purple load with reinforcement to reduce the size of the antrum. The sleeve was then shaped using blue loads without reinforcement up the the left anita using the 36 Irish Visi G bougie as a sizing device.  The distal sleeve was stapled slightly further away from the scope than the proximal sleeve to avoid encroachment of the incisura. Bleeding was controlled with clips.     A provacative leak test, looking for air bubbles while the sleeve is insufflated and clamped, was preformed and did not reveal any leaks. The EGD done during the leak test revealed an appropriately sized sleeve without any narrowings or kinking.     Hemostasis was achieved.    The specimen was removed out of the left upper quadrant port and the site was closed with a zero vicryl.     Marcaine was injected at each port site.    The wounds were heavily irrigated. The skin was closed with 4-0 suture. Sterile dressings were applied.    Instrument, sponge, and needle counts were correct prior to wound closure and at the conclusion of the case.     Findings:  Normal anatomy    Estimated Blood Loss: 20.0 cc    Drains: none    Total IV Fluids: 1000 ml    Specimens: gastrectomy    Implants: none    Complications:  None; patient tolerated the procedure well.    Disposition: PACU - hemodynamically stable.    Condition: stable    Attending Attestation: I was present and scrubbed for the entire procedure.

## 2020-03-02 NOTE — PLAN OF CARE
Report to Esdras,.Denies pain and discomfort, dressing to abdomen dry, intact no drainage. Vs stable, resting comfortably,  present.

## 2020-03-02 NOTE — PLAN OF CARE
Plan of care reviewed, patient verbalized understanding. AAOx4. PIV infusing per order. IV abx infusing per order. VS addressed. PT. Remain afebrile throughout shift. Tele monitored. Lap sites x6 CDI with bandaids. Moderate pain control with PRN meds. Bed in lowest position, Srx2, brakes locked, call light within reach. Patient remains free from fall and injury. Will continue to monitor.

## 2020-03-02 NOTE — ASSESSMENT & PLAN NOTE
Body mass index is 36.61 kg/m².  POD 0 s/p gastric sleeve  Tele-monitoring.  Continue to follow Dr. Garcia's recommendations.  Needs aggressive incentive spirometry.  Follow hemoglobin and hematocrit closely.  Pain control with IV narcotics and antiemetics as needed.  Observe fall precautions.  Gastric ulcer prophylaxis with gastric acid suppressant.  Deep venous thrombosis prophylaxis with Lovenox 40 mg SQ daily.  See Orders.

## 2020-03-02 NOTE — SUBJECTIVE & OBJECTIVE
Past Medical History:   Diagnosis Date    Allergy     Chest pain     negative stress test and heart cath    Headache     Kidney stone     ALMA (obstructive sleep apnea)     Seasonal allergies        Past Surgical History:   Procedure Laterality Date    abscess removal from right side of neck      APPENDECTOMY      BREAST SURGERY Left     lumpectomy, benign    CARDIAC CATHETERIZATION      part of evaluation for chest pain, negative     SECTION      x1    COLONOSCOPY N/A 10/21/2019    Procedure: COLONOSCOPY;  Surgeon: Tereso Hoffman MD;  Location: [facility];  Service: Endoscopy;  Laterality: N/A;    ENDOMETRIAL ABLATION      TUBAL LIGATION      UPPER GASTROINTESTINAL ENDOSCOPY  prior to        Review of patient's allergies indicates:   Allergen Reactions    Morphine Hallucinations    Topiramate      Marked drowsiness         No current facility-administered medications on file prior to encounter.      Current Outpatient Medications on File Prior to Encounter   Medication Sig    multivitamin capsule Take 1 capsule by mouth once daily.    butalbital-acetaminop-caf-cod -78-30 mg Cap Take 1 capsule by mouth every 6 (six) hours as needed.    cetirizine (ZYRTEC) 10 MG tablet Take 10 mg by mouth once daily.    galcanezumab-gnlm (EMGALITY PEN) 120 mg/mL PnIj Inject 120 mg into the skin every 28 days.    sumatriptan (IMITREX) 50 MG tablet TAKE 1 TABLET AT ONSET OF HEADACHE MAY REPEAT IN 2 HOURS TO A MAX OF 3 PER DAY 2 DAYS PER WEEK (Patient not taking: Reported on 2020)     Family History     Problem Relation (Age of Onset)    Cancer Mother    Diabetes Father, Mother    Heart disease Father (50), Mother (50)    Multiple sclerosis Father    Thyroid disease Daughter    Transient ischemic attack Sister        Tobacco Use    Smoking status: Former Smoker     Packs/day: 0.75     Years: 30.00     Pack years: 22.50     Types: Cigarettes    Smokeless tobacco: Never Used    Tobacco  comment: quit on 8/31/2019   Substance and Sexual Activity    Alcohol use: No    Drug use: No    Sexual activity: Yes     Partners: Male     Review of Systems   Constitutional: Negative for activity change, appetite change, chills, fatigue and fever.   HENT: Negative for congestion, postnasal drip, sore throat and trouble swallowing.    Eyes: Negative for photophobia and visual disturbance.   Respiratory: Negative for cough, shortness of breath and wheezing.    Cardiovascular: Negative for chest pain, palpitations and leg swelling.   Gastrointestinal: Positive for abdominal pain. Negative for abdominal distention, constipation, diarrhea, nausea and vomiting.   Genitourinary: Negative for difficulty urinating, dysuria, flank pain and hematuria.   Musculoskeletal: Negative for arthralgias and myalgias.   Skin: Negative for color change.   Neurological: Negative for dizziness, weakness and headaches.   Psychiatric/Behavioral: Negative for confusion. The patient is not nervous/anxious.      Objective:     Vital Signs (Most Recent):  Temp: 97.9 °F (36.6 °C) (03/02/20 1515)  Pulse: 84 (03/02/20 1520)  Resp: 17 (03/02/20 1520)  BP: (!) 163/74 (03/02/20 1520)  SpO2: (!) 94 % (03/02/20 1520) Vital Signs (24h Range):  Temp:  [97.9 °F (36.6 °C)] 97.9 °F (36.6 °C)  Pulse:  [78-90] 84  Resp:  [17-21] 17  SpO2:  [94 %-97 %] 94 %  BP: (133-196)/(74-88) 163/74     Weight: 99.8 kg (220 lb)  Body mass index is 36.61 kg/m².    Physical Exam   Constitutional: She is oriented to person, place, and time. She appears well-developed and well-nourished. No distress.   HENT:   Head: Normocephalic and atraumatic.   Eyes: Pupils are equal, round, and reactive to light. Conjunctivae and EOM are normal.   Neck: Normal range of motion. Neck supple. No thyromegaly present.   Cardiovascular: Normal rate, regular rhythm, normal heart sounds and intact distal pulses.   No murmur heard.  Pulmonary/Chest: Effort normal and breath sounds normal. No  respiratory distress.   Abdominal: Soft. She exhibits no distension. There is tenderness (incisional).   Lap sites CDI.  Tinkling bowel sounds.   Musculoskeletal: Normal range of motion. She exhibits no edema or tenderness.   Neurological: She is alert and oriented to person, place, and time. No cranial nerve deficit.   Skin: Skin is warm and dry. Capillary refill takes less than 2 seconds. No erythema.   Psychiatric: She has a normal mood and affect. Her behavior is normal. Judgment and thought content normal.         CRANIAL NERVES     CN III, IV, VI   Pupils are equal, round, and reactive to light.  Extraocular motions are normal.        Significant Labs: I reviewed preoperative labs.

## 2020-03-03 ENCOUNTER — DOCUMENTATION ONLY (OUTPATIENT)
Dept: BARIATRICS | Facility: CLINIC | Age: 55
End: 2020-03-03

## 2020-03-03 VITALS
HEART RATE: 87 BPM | BODY MASS INDEX: 35.32 KG/M2 | TEMPERATURE: 99 F | DIASTOLIC BLOOD PRESSURE: 61 MMHG | RESPIRATION RATE: 16 BRPM | OXYGEN SATURATION: 93 % | HEIGHT: 65 IN | SYSTOLIC BLOOD PRESSURE: 117 MMHG | WEIGHT: 212 LBS

## 2020-03-03 LAB
ANION GAP SERPL CALC-SCNC: 8 MMOL/L (ref 8–16)
BASOPHILS # BLD AUTO: ABNORMAL K/UL (ref 0–0.2)
BASOPHILS NFR BLD: 0 % (ref 0–1.9)
BUN SERPL-MCNC: 7 MG/DL (ref 6–20)
CALCIUM SERPL-MCNC: 8.7 MG/DL (ref 8.7–10.5)
CHLORIDE SERPL-SCNC: 104 MMOL/L (ref 95–110)
CO2 SERPL-SCNC: 26 MMOL/L (ref 23–29)
CREAT SERPL-MCNC: 0.7 MG/DL (ref 0.5–1.4)
DIFFERENTIAL METHOD: ABNORMAL
EOSINOPHIL # BLD AUTO: ABNORMAL K/UL (ref 0–0.5)
EOSINOPHIL NFR BLD: 0 % (ref 0–8)
ERYTHROCYTE [DISTWIDTH] IN BLOOD BY AUTOMATED COUNT: 13 % (ref 11.5–14.5)
EST. GFR  (AFRICAN AMERICAN): >60 ML/MIN/1.73 M^2
EST. GFR  (NON AFRICAN AMERICAN): >60 ML/MIN/1.73 M^2
GLUCOSE SERPL-MCNC: 109 MG/DL (ref 70–110)
HCT VFR BLD AUTO: 38.4 % (ref 37–48.5)
HGB BLD-MCNC: 12.3 G/DL (ref 12–16)
IMM GRANULOCYTES # BLD AUTO: ABNORMAL K/UL (ref 0–0.04)
LYMPHOCYTES # BLD AUTO: ABNORMAL K/UL (ref 1–4.8)
LYMPHOCYTES NFR BLD: 14 % (ref 18–48)
MAGNESIUM SERPL-MCNC: 2 MG/DL (ref 1.6–2.6)
MCH RBC QN AUTO: 29.4 PG (ref 27–31)
MCHC RBC AUTO-ENTMCNC: 32 G/DL (ref 32–36)
MCV RBC AUTO: 92 FL (ref 82–98)
MONOCYTES # BLD AUTO: ABNORMAL K/UL (ref 0.3–1)
MONOCYTES NFR BLD: 3 % (ref 4–15)
NEUTROPHILS NFR BLD: 83 % (ref 38–73)
NRBC BLD-RTO: 0 /100 WBC
PHOSPHATE SERPL-MCNC: 3.1 MG/DL (ref 2.7–4.5)
PLATELET # BLD AUTO: 337 K/UL (ref 150–350)
PLATELET BLD QL SMEAR: ABNORMAL
PMV BLD AUTO: 9.8 FL (ref 9.2–12.9)
POIKILOCYTOSIS BLD QL SMEAR: SLIGHT
POTASSIUM SERPL-SCNC: 4.5 MMOL/L (ref 3.5–5.1)
RBC # BLD AUTO: 4.18 M/UL (ref 4–5.4)
SODIUM SERPL-SCNC: 138 MMOL/L (ref 136–145)
WBC # BLD AUTO: 13.64 K/UL (ref 3.9–12.7)

## 2020-03-03 PROCEDURE — 94761 N-INVAS EAR/PLS OXIMETRY MLT: CPT

## 2020-03-03 PROCEDURE — S0028 INJECTION, FAMOTIDINE, 20 MG: HCPCS | Performed by: SURGERY

## 2020-03-03 PROCEDURE — 80048 BASIC METABOLIC PNL TOTAL CA: CPT

## 2020-03-03 PROCEDURE — 63600175 PHARM REV CODE 636 W HCPCS: Performed by: NURSE PRACTITIONER

## 2020-03-03 PROCEDURE — 99900035 HC TECH TIME PER 15 MIN (STAT)

## 2020-03-03 PROCEDURE — 85027 COMPLETE CBC AUTOMATED: CPT

## 2020-03-03 PROCEDURE — 63600175 PHARM REV CODE 636 W HCPCS: Performed by: SURGERY

## 2020-03-03 PROCEDURE — 84100 ASSAY OF PHOSPHORUS: CPT

## 2020-03-03 PROCEDURE — 25000003 PHARM REV CODE 250: Performed by: SURGERY

## 2020-03-03 PROCEDURE — 83735 ASSAY OF MAGNESIUM: CPT

## 2020-03-03 PROCEDURE — 36415 COLL VENOUS BLD VENIPUNCTURE: CPT

## 2020-03-03 PROCEDURE — 85007 BL SMEAR W/DIFF WBC COUNT: CPT

## 2020-03-03 RX ORDER — OXYCODONE HYDROCHLORIDE 10 MG/1
10 TABLET ORAL EVERY 4 HOURS PRN
Status: DISCONTINUED | OUTPATIENT
Start: 2020-03-03 | End: 2020-03-03 | Stop reason: HOSPADM

## 2020-03-03 RX ORDER — ONDANSETRON 2 MG/ML
8 INJECTION INTRAMUSCULAR; INTRAVENOUS EVERY 6 HOURS PRN
Status: DISCONTINUED | OUTPATIENT
Start: 2020-03-03 | End: 2020-03-03 | Stop reason: HOSPADM

## 2020-03-03 RX ORDER — ONDANSETRON 4 MG/1
4 TABLET, ORALLY DISINTEGRATING ORAL EVERY 6 HOURS PRN
Qty: 30 TABLET | Refills: 0 | Status: SHIPPED | OUTPATIENT
Start: 2020-03-03

## 2020-03-03 RX ORDER — HYDROCODONE BITARTRATE AND ACETAMINOPHEN 7.5; 325 MG/1; MG/1
1 TABLET ORAL EVERY 4 HOURS PRN
Qty: 40 TABLET | Refills: 0 | Status: SHIPPED | OUTPATIENT
Start: 2020-03-03

## 2020-03-03 RX ORDER — OMEPRAZOLE 20 MG/1
20 CAPSULE, DELAYED RELEASE ORAL DAILY
Qty: 30 CAPSULE | Refills: 3 | Status: SHIPPED | OUTPATIENT
Start: 2020-03-03 | End: 2021-03-03

## 2020-03-03 RX ORDER — DIAZEPAM 2 MG/1
2 TABLET ORAL EVERY 6 HOURS PRN
Qty: 20 TABLET | Refills: 0 | Status: SHIPPED | OUTPATIENT
Start: 2020-03-03 | End: 2020-03-19 | Stop reason: SDUPTHER

## 2020-03-03 RX ORDER — DIAZEPAM 2 MG/1
2 TABLET ORAL EVERY 6 HOURS PRN
Status: DISCONTINUED | OUTPATIENT
Start: 2020-03-03 | End: 2020-03-03 | Stop reason: HOSPADM

## 2020-03-03 RX ADMIN — FAMOTIDINE 20 MG: 10 INJECTION, SOLUTION INTRAVENOUS at 09:03

## 2020-03-03 RX ADMIN — ONDANSETRON 8 MG: 2 INJECTION INTRAMUSCULAR; INTRAVENOUS at 12:03

## 2020-03-03 RX ADMIN — CEFAZOLIN SODIUM 2 G: 2 SOLUTION INTRAVENOUS at 12:03

## 2020-03-03 RX ADMIN — SODIUM CHLORIDE, SODIUM LACTATE, POTASSIUM CHLORIDE, AND CALCIUM CHLORIDE: .6; .31; .03; .02 INJECTION, SOLUTION INTRAVENOUS at 03:03

## 2020-03-03 RX ADMIN — HYDROMORPHONE HYDROCHLORIDE 1 MG: 1 INJECTION, SOLUTION INTRAMUSCULAR; INTRAVENOUS; SUBCUTANEOUS at 03:03

## 2020-03-03 RX ADMIN — CEFAZOLIN SODIUM 2 G: 2 SOLUTION INTRAVENOUS at 06:03

## 2020-03-03 RX ADMIN — OXYCODONE HYDROCHLORIDE 10 MG: 10 TABLET ORAL at 09:03

## 2020-03-03 NOTE — PLAN OF CARE
AAOx3 bned low alarms activated VSS tolerating bariatric diet Ambulating to bathroom, Verbalizes understanding of poc

## 2020-03-03 NOTE — PROGRESS NOTES
03/03/20 1003   Vital Signs   Pulse 73   BP (!) 115/58   MAP (mmHg) 83   BP Location Right arm   Patient Position Sitting     Sat up pt slowly on side of bed to get up to ambulate. Pt became dizzy and lightheaded. BP as above. Cool rag provided. Pt asked to stay sitting on side of bed as she feels if she lays back down at this time she will feel dizzy again. Call light within reach and pt instructed not to get up without assistance. Pt verbalized understanding. LEONARD Holland, primary nurse updated.

## 2020-03-03 NOTE — NURSING
0310 Pt having severe nausea, not resolved with PRN meds.    Msg NP to make aware, phenergan ordered.    0325 After ambulating to restroom, pt complained of chest pains, vitals taken and stable, telemetry reading wnl.  Possibly gas pain related; gave PRN pain meds, offered moderate relief, pt resting at the moment.  Made NP on call aware of the situation.

## 2020-03-03 NOTE — HOSPITAL COURSE
Patient was admitted to hospital medicine. Patient was closely monitored post-operatively. Patient was given supportive care.  Her initial nausea and abdominal pain improved with p.r.n. medications.  She ambulated without issue.  N Patient tolerated liquid diet. Symptoms resolved.  Pt was evaluated by Dr. Garcia and deemed appropriate for discharge. Patient was discharged on full liquid diet with medications as per Dr. Garcia's orders. Patient to follow up with Dr. Garcia as scheduled.

## 2020-03-03 NOTE — ASSESSMENT & PLAN NOTE
Body mass index is 35.28 kg/m².  POD 1 s/p gastric sleeve  Tele-monitoring.  Continue to follow Dr. Garcia's recommendations.  Needs aggressive incentive spirometry.  Follow hemoglobin and hematocrit closely.  Pain control with IV narcotics and antiemetics as needed.  Observe fall precautions.  Gastric ulcer prophylaxis with gastric acid suppressant.  Deep venous thrombosis prophylaxis with Lovenox 40 mg SQ daily.  See Orders.

## 2020-03-03 NOTE — DISCHARGE SUMMARY
[facility] Hospital Medicine  Discharge Summary      Patient Name: Marsha Choi  MRN: [id number]  Admission Date: 3/2/2020  Hospital Length of Stay: 1 days  Discharge Date and Time:  03/03/2020 3:58 PM  Attending Physician: Nitish Velasco MD   Discharging Provider: Yoana Villanueva NP  Primary Care Provider: Lovely Garza MD      HPI:   Marsha Choi is a 54-year-old female with PMHx significant for morbid obesity, migraines, ALMA, and DDD.  Pt is S/P gastric sleeve with Dr. Garcia.  Pt was evaluated by Dr. Garcia preoperatively for surgical management of her morbid obesity.  Postoperatively, she reports 8/10 abdominal pain in PACU.  She denies any nausea, vomiting, fever, chills, chest pain, SOB, or other complaints at this time.  Other pertinent medical Hx as below:    Procedure(s) (LRB):  GASTRECTOMY, SLEEVE, LAPAROSCOPIC (N/A)      Hospital Course:   Patient was admitted to hospital medicine. Patient was closely monitored post-operatively. Patient was given supportive care.  Her initial nausea and abdominal pain improved with p.r.n. medications.  She ambulated without issue.  N Patient tolerated liquid diet. Symptoms resolved.  Pt was evaluated by Dr. Garcia and deemed appropriate for discharge. Patient was discharged on full liquid diet with medications as per Dr. Garcia's orders. Patient to follow up with Dr. Garcia as scheduled.     Consults:     No new Assessment & Plan notes have been filed under this hospital service since the last note was generated.  Service: Hospital Medicine    Final Active Diagnoses:    Diagnosis Date Noted POA    PRINCIPAL PROBLEM:  Morbid obesity [E66.01] 09/24/2019 Yes    DDD (degenerative disc disease), lumbar [M51.36] 05/05/2017 Yes    Chronic nonintractable headache [R51] 05/05/2017 Yes    ALMA (obstructive sleep apnea) [G47.33]  Yes      Problems Resolved During this Admission:       Discharged Condition: good    Disposition: Home or Self  Care    Follow Up:  Follow-up Information     Erma Garcia MD In 2 weeks.    Specialties:  General Surgery, Bariatrics, Surgery  Contact information:  [address]  [address]  [place] [address]  [phone]             Lovely Garza MD In 1 week.    Specialty:  Family Medicine  Contact information:  [address]  [place] [address]  [phone]             Please follow up.    Why:  If symptoms worsen               Patient Instructions:      Diet full liquid   Order Comments: Please follow Dr. Garcia's detailed instructions regarding diet.     Notify your health care provider if you experience any of the following:  temperature >100.4     Notify your health care provider if you experience any of the following:  persistent nausea and vomiting or diarrhea     Notify your health care provider if you experience any of the following:  severe uncontrolled pain     Notify your health care provider if you experience any of the following:  redness, tenderness, or signs of infection (pain, swelling, redness, odor or green/yellow discharge around incision site)     Notify your health care provider if you experience any of the following:  difficulty breathing or increased cough     Notify your health care provider if you experience any of the following:  persistent dizziness, light-headedness, or visual disturbances     Activity as tolerated       Significant Diagnostic Studies: Labs:   CMP   Recent Labs   Lab 03/03/20  0521      K 4.5      CO2 26      BUN 7   CREATININE 0.7   CALCIUM 8.7   ANIONGAP 8   ESTGFRAFRICA >60   EGFRNONAA >60    and CBC   Recent Labs   Lab 03/03/20  0521   WBC 13.64*   HGB 12.3   HCT 38.4          Pending Diagnostic Studies:     Procedure Component Value Units Date/Time    Specimen to Pathology, Surgery General Surgery [[id number]] Collected:  03/02/20 1407    Order Status:  Sent Lab Status:  In process Updated:  03/02/20 1502         Medications:  Reconciled Home  Medications:      Medication List      START taking these medications    diazePAM 2 MG tablet  Commonly known as:  VALIUM  Take 1 tablet (2 mg total) by mouth every 6 (six) hours as needed for Anxiety (muscle spasm).     HYDROcodone-acetaminophen 7.5-325 mg per tablet  Commonly known as:  NORCO  Take 1 tablet by mouth every 4 (four) hours as needed for Pain.     omeprazole 20 MG capsule  Commonly known as:  PRILOSEC  Take 1 capsule (20 mg total) by mouth once daily.     ondansetron 4 MG Tbdl  Commonly known as:  ZOFRAN-ODT  Take 1 tablet (4 mg total) by mouth every 6 (six) hours as needed.        CONTINUE taking these medications    butalbitaL-acetaminop-caf-cod -93-30 mg Cap  Take 1 capsule by mouth every 6 (six) hours as needed.     cetirizine 10 MG tablet  Commonly known as:  ZYRTEC  Take 10 mg by mouth once daily.     Emgality Pen 120 mg/mL Pnij  Generic drug:  galcanezumab-gnlm  Inject 120 mg into the skin every 28 days.     multivitamin capsule  Take 1 capsule by mouth once daily.     sumatriptan 50 MG tablet  Commonly known as:  IMITREX  TAKE 1 TABLET AT ONSET OF HEADACHE MAY REPEAT IN 2 HOURS TO A MAX OF 3 PER DAY 2 DAYS PER WEEK            Indwelling Lines/Drains at time of discharge:   Lines/Drains/Airways     None                 Time spent on the discharge of patient: 34 minutes  Patient was seen and examined on the date of discharge and determined to be suitable for discharge.         Yoana Villanueva NP  Department of Hospital Medicine  [facility]

## 2020-03-03 NOTE — SUBJECTIVE & OBJECTIVE
Interval History:  Pt endorses pain overnight-some relieved with IV narcotics.  She reports nausea vomiting around 3:00 a.m..  She reports she has been able tolerate activity due to pain and nausea.  She is tolerating water only.  She denies any chest pain, but does report gas pain that radiates up from her stomach-none now.  Review of Systems   Constitutional: Positive for fatigue. Negative for chills and fever.   HENT: Negative for sore throat and trouble swallowing.    Eyes: Negative for photophobia and visual disturbance.   Respiratory: Negative for cough and shortness of breath.    Cardiovascular: Negative for chest pain.   Gastrointestinal: Positive for abdominal pain, nausea and vomiting. Negative for constipation and diarrhea.   Genitourinary: Negative for difficulty urinating and dysuria.   Musculoskeletal: Negative for arthralgias.   Neurological: Negative for headaches.     Objective:     Vital Signs (Most Recent):  Temp: 98.8 °F (37.1 °C) (03/03/20 1121)  Pulse: 87 (03/03/20 1121)  Resp: 16 (03/03/20 1121)  BP: 117/61 (03/03/20 1121)  SpO2: (!) 93 % (03/03/20 1121) Vital Signs (24h Range):  Temp:  [97.5 °F (36.4 °C)-99 °F (37.2 °C)] 98.8 °F (37.1 °C)  Pulse:  [67-90] 87  Resp:  [16-21] 16  SpO2:  [92 %-100 %] 93 %  BP: (115-196)/(57-88) 117/61     Weight: 96.2 kg (212 lb)  Body mass index is 35.28 kg/m².    Intake/Output Summary (Last 24 hours) at 3/3/2020 1131  Last data filed at 3/3/2020 0320  Gross per 24 hour   Intake 1800 ml   Output 1 ml   Net 1799 ml      Physical Exam   Constitutional: She is oriented to person, place, and time. She appears well-developed and well-nourished.   HENT:   Head: Normocephalic and atraumatic.   Eyes: Pupils are equal, round, and reactive to light. Conjunctivae and EOM are normal.   Neck: Normal range of motion. Neck supple.   Cardiovascular: Normal rate, regular rhythm, normal heart sounds and intact distal pulses.   Pulmonary/Chest: Effort normal and breath sounds  normal. No respiratory distress.   Abdominal: Soft. There is tenderness (Incisional). There is no guarding.   Hypoactive bowel sounds   Musculoskeletal: Normal range of motion. She exhibits no edema.   Neurological: She is alert and oriented to person, place, and time.   Skin: Skin is warm and dry. Capillary refill takes less than 2 seconds.   Psychiatric: She has a normal mood and affect. Her behavior is normal. Judgment and thought content normal.       Significant Labs:   CBC:   Recent Labs   Lab 03/03/20  0521   WBC 13.64*   HGB 12.3   HCT 38.4        CMP:   Recent Labs   Lab 03/03/20  0521      K 4.5      CO2 26      BUN 7   CREATININE 0.7   CALCIUM 8.7   ANIONGAP 8   EGFRNONAA >60

## 2020-03-03 NOTE — PROGRESS NOTES
[facility] Hospital Medicine  Progress Note    Patient Name: Marsha Choi  MRN: [id number]  Patient Class: IP- Inpatient   Admission Date: 3/2/2020  Length of Stay: 1 days  Attending Physician: Nitish Velasco MD  Primary Care Provider: Lovely Garza MD        Subjective:     Principal Problem:Morbid obesity        HPI:  Marsha Choi is a 54-year-old female with PMHx significant for morbid obesity, migraines, ALMA, and DDD.  Pt is S/P gastric sleeve with Dr. Garcia.  Pt was evaluated by Dr. Garcia preoperatively for surgical management of her morbid obesity.  Postoperatively, she reports 8/10 abdominal pain in PACU.  She denies any nausea, vomiting, fever, chills, chest pain, SOB, or other complaints at this time.  Other pertinent medical Hx as below:    Overview/Hospital Course:  No notes on file    Interval History:  Pt endorses pain overnight-some relieved with IV narcotics.  She reports nausea vomiting around 3:00 a.m..  She reports she has been unable tolerate activity due to pain and nausea.  She is tolerating water only.  She denies any chest pain, but does report gas pain that radiates up from her stomach-none now.  Review of Systems   Constitutional: Positive for fatigue. Negative for chills and fever.   HENT: Negative for sore throat and trouble swallowing.    Eyes: Negative for photophobia and visual disturbance.   Respiratory: Negative for cough and shortness of breath.    Cardiovascular: Negative for chest pain.   Gastrointestinal: Positive for abdominal pain, nausea and vomiting. Negative for constipation and diarrhea.   Genitourinary: Negative for difficulty urinating and dysuria.   Musculoskeletal: Negative for arthralgias.   Neurological: Negative for headaches.     Objective:     Vital Signs (Most Recent):  Temp: 98.8 °F (37.1 °C) (03/03/20 1121)  Pulse: 87 (03/03/20 1121)  Resp: 16 (03/03/20 1121)  BP: 117/61 (03/03/20 1121)  SpO2: (!) 93 % (03/03/20 1121) Vital Signs  (24h Range):  Temp:  [97.5 °F (36.4 °C)-99 °F (37.2 °C)] 98.8 °F (37.1 °C)  Pulse:  [67-90] 87  Resp:  [16-21] 16  SpO2:  [92 %-100 %] 93 %  BP: (115-196)/(57-88) 117/61     Weight: 96.2 kg (212 lb)  Body mass index is 35.28 kg/m².    Intake/Output Summary (Last 24 hours) at 3/3/2020 1131  Last data filed at 3/3/2020 0320  Gross per 24 hour   Intake 1800 ml   Output 1 ml   Net 1799 ml      Physical Exam   Constitutional: She is oriented to person, place, and time. She appears well-developed and well-nourished.   HENT:   Head: Normocephalic and atraumatic.   Eyes: Pupils are equal, round, and reactive to light. Conjunctivae and EOM are normal.   Neck: Normal range of motion. Neck supple.   Cardiovascular: Normal rate, regular rhythm, normal heart sounds and intact distal pulses.   Pulmonary/Chest: Effort normal and breath sounds normal. No respiratory distress.   Abdominal: Soft. There is tenderness (Incisional). There is no guarding.   Hypoactive bowel sounds   Musculoskeletal: Normal range of motion. She exhibits no edema.   Neurological: She is alert and oriented to person, place, and time.   Skin: Skin is warm and dry. Capillary refill takes less than 2 seconds.   Psychiatric: She has a normal mood and affect. Her behavior is normal. Judgment and thought content normal.       Significant Labs:   CBC:   Recent Labs   Lab 03/03/20  0521   WBC 13.64*   HGB 12.3   HCT 38.4        CMP:   Recent Labs   Lab 03/03/20  0521      K 4.5      CO2 26      BUN 7   CREATININE 0.7   CALCIUM 8.7   ANIONGAP 8   EGFRNONAA >60             Assessment/Plan:      * Morbid obesity  Body mass index is 35.28 kg/m².  POD 1 s/p gastric sleeve  Tele-monitoring.  Continue to follow Dr. Garcia's recommendations.  Needs aggressive incentive spirometry.  Follow hemoglobin and hematocrit closely.  Pain control with IV narcotics and antiemetics as needed.  Observe fall precautions.  Gastric ulcer prophylaxis with gastric  acid suppressant.  Deep venous thrombosis prophylaxis with Lovenox 40 mg SQ daily.  See Orders.      ALMA (obstructive sleep apnea)  Pt does not use CPAP.  Monitor closely postoperatively.      Chronic nonintractable headache  Pt currently followed by Neurology and well managed on Emgality.  Continue as outpatient.       DDD (degenerative disc disease), lumbar  Chronic, pain control per surgeon's orders postoperatively.        VTE Risk Mitigation (From admission, onward)         Ordered     enoxaparin injection 40 mg  Daily      03/02/20 1422     Place SEBASTIEN hose  Until discontinued      03/02/20 1422     Place sequential compression device  Until discontinued      03/02/20 1422     IP VTE HIGH RISK PATIENT  Once      03/02/20 1422                      Yoana Villanueva NP  Department of Hospital Medicine   [facility]

## 2020-03-03 NOTE — PLAN OF CARE
03/03/20 1559   Final Note   Assessment Type Final Discharge Note   Anticipated Discharge Disposition Home

## 2020-03-03 NOTE — DISCHARGE INSTRUCTIONS
Remove band aids tonight  Keep white strips until they fall off  Shower over incisions daily with soap and water  Do not bath or get into a pool  Stage 1a of diet packet starts today  Use Incentive Spirometer at Home    Thank you for choosing [facility] for your medical care. The primary doctor who is taking care of you at the time of your discharge is Nitish Velasco MD.     You were admitted to the hospital with Morbid obesity.     Please note your discharge instructions, including diet/activity restrictions, follow-up appointments, and medication changes.  If you have any questions about your medical issues, prescriptions, or any other questions, please feel free to contact the [facility] Medicine Dept at [phone] and we will help.    If you are previously with Home health, outpatient PT/OT or under a therapy program, you are cleared to return to those programs.    Please direct all long term medication refills and follow up to your primary care provider, Lovely Garza MD. Thank you again for letting us take care of your health care needs.    Please note the following discharge instructions per your discharging physician-  Yoana Villanueva NP    Follow Dr Monge discharge instructions given to you follow all instructions as directed to do ,Notify Dr Garcia immediately for any problems.

## 2020-03-03 NOTE — PLAN OF CARE
03/03/20 0723   Patient Assessment/Suction   Level of Consciousness (AVPU) alert   Respiratory Effort Unlabored   PRE-TX-O2   O2 Device (Oxygen Therapy) room air   SpO2 (!) 93 %   Pulse Oximetry Type Intermittent   $ Pulse Oximetry - Multiple Charge Pulse Oximetry - Multiple   Pulse 73   Resp 18   Incentive Spirometer   $ Incentive Spirometer Charges proper technique demonstrated   Administration (IS) mouthpiece;instruction provided, follow-up   Number of Repetitions (IS) 4   Level Incentive Spirometer (mL) 750   Patient Tolerance (IS) fair;other (see comments)  (pt complaining of gas.)

## 2020-03-03 NOTE — NURSING
Diis charge instructions given spouse present at bed side both verbalize understanding of all instructions leaving unit via wheel chair for dc to home at this time

## 2020-03-03 NOTE — PLAN OF CARE
Patient AAO, VSS. Pt verbalized understanding of POC. PIV CDI/ Infusing as ordered. POD x 1. Lapsites wnl. Telemetry monitoring in place. IS @BS. PT c/o pain and nausea, PRN meds ordered. Purposeful hourly/q2hr rounding done during shift to promote patient safety. Patient free from falls and injury during shift.  Bed in lowest position, brakes locked, and call light within reach.  Will continue to monitor.

## 2020-03-03 NOTE — PROGRESS NOTES
Progress Note  Gen Surg    Admit Date: 3/2/2020  Attending: Radha  S/P: Procedure(s) (LRB):  GASTRECTOMY, SLEEVE, LAPAROSCOPIC (N/A)    Post-operative Day: 1 Day Post-Op    Hospital Day: 2    SUBJECTIVE:     Doing well  Tolerating liquids  Pain controlled  Nausea resolve     OBJECTIVE:     Vital Signs (Most Recent)  Temp: 98.8 °F (37.1 °C) (03/03/20 1121)  Pulse: 87 (03/03/20 1121)  Resp: 16 (03/03/20 1121)  BP: 117/61 (03/03/20 1121)  SpO2: (!) 93 % (03/03/20 1121)    Vital Signs Range (Last 24H):  Temp:  [97.5 °F (36.4 °C)-99 °F (37.2 °C)]   Pulse:  [67-90]   Resp:  [16-21]   BP: (115-196)/(57-88)   SpO2:  [92 %-100 %]     I & O (Last 24H):    Intake/Output Summary (Last 24 hours) at 3/3/2020 1422  Last data filed at 3/3/2020 0320  Gross per 24 hour   Intake 50 ml   Output 1 ml   Net 49 ml       Scheduled medications:   enoxparin  40 mg Subcutaneous Daily    famotidine (PF)  20 mg Intravenous Q12H    promethazine (PHENERGAN) IVPB  6.25 mg Intravenous Once       Physical Exam:  General: no distress  Lungs:  clear to auscultation bilaterally and normal respiratory effort  Heart: regular rate and rhythm, S1, S2 normal, no murmur, rub or gallop  Abdomen: soft, non-tender non-distented; bowel sounds normal; no masses,  no organomegaly    Wound/Incision:  clean, dry, intact    Laboratory:  Labs within the past 24 hours have been reviewed.    ASSESSMENT/PLAN:     Ok to discharge    Erma Garcia MD

## 2020-03-03 NOTE — ANESTHESIA POSTPROCEDURE EVALUATION
Anesthesia Post Evaluation    Patient: Marsah Choi    Procedure(s) Performed: Procedure(s) (LRB):  GASTRECTOMY, SLEEVE, LAPAROSCOPIC (N/A)    Final Anesthesia Type: general    Patient location during evaluation: PACU  Patient participation: Yes- Able to Participate  Level of consciousness: awake and alert  Post-procedure vital signs: reviewed and stable  Pain management: adequate  Airway patency: patent    PONV status at discharge: No PONV  Anesthetic complications: no      Cardiovascular status: hemodynamically stable  Respiratory status: unassisted and room air  Hydration status: euvolemic  Follow-up not needed.          Vitals Value Taken Time   /70 3/2/2020  7:06 PM   Temp 36.5 °C (97.7 °F) 3/2/2020  7:06 PM   Pulse 87 3/2/2020  7:06 PM   Resp 18 3/2/2020  7:06 PM   SpO2 96 % 3/2/2020  7:06 PM         Event Time     Out of Recovery 16:25:00          Pain/Catalina Score: Pain Rating Prior to Med Admin: 8 (3/2/2020  6:24 PM)  Catalina Score: 8 (3/2/2020  3:50 PM)

## 2020-03-03 NOTE — PROGRESS NOTES
Pt weigh in before surgery    tanita scale: 220.8lb wt           36.7bmi           51.3% fat           113.2lb fat mass           107.6lb ffm           77.8lb tbw    Pt successfully return completed hydration protocol with dietitian.

## 2020-03-03 NOTE — PLAN OF CARE
CM met with pt bedside to complete discharge assessment. Pt verified information on facesheet as correct. Denies POA/LW. Reports living at listed address with spouse. PCP is Dr. Holguin. Pt denies hh/hd/dme. Reports being independent with ADLs. DC plan is home. CM following.     03/03/20 1557   Discharge Assessment   Assessment Type Discharge Planning Assessment   Confirmed/corrected address and phone number on facesheet? Yes   Assessment information obtained from? Patient   Communicated expected length of stay with patient/caregiver yes   Prior to hospitilization cognitive status: Alert/Oriented   Prior to hospitalization functional status: Independent   Current cognitive status: Alert/Oriented   Current Functional Status: Independent   Lives With spouse   Able to Return to Prior Arrangements yes   Is patient able to care for self after discharge? Yes   Patient's perception of discharge disposition home or selfcare   Readmission Within the Last 30 Days no previous admission in last 30 days   Patient currently being followed by outpatient case management? No   Patient currently receives any other outside agency services? No   Equipment Currently Used at Home none   Do you have any problems affording any of your prescribed medications? No   Is the patient taking medications as prescribed? yes   Does the patient have transportation home? Yes   Transportation Anticipated family or friend will provide   Does the patient receive services at the [facility]? No   Discharge Plan A Home   DME Needed Upon Discharge  none   Patient/Family in Agreement with Plan yes

## 2020-03-04 ENCOUNTER — TELEPHONE (OUTPATIENT)
Dept: MEDSURG UNIT | Facility: HOSPITAL | Age: 55
End: 2020-03-04

## 2020-03-05 ENCOUNTER — TELEPHONE (OUTPATIENT)
Dept: PHARMACY | Facility: CLINIC | Age: 55
End: 2020-03-05

## 2020-03-05 ENCOUNTER — TELEPHONE (OUTPATIENT)
Dept: SURGERY | Facility: CLINIC | Age: 55
End: 2020-03-05

## 2020-03-05 LAB
FINAL PATHOLOGIC DIAGNOSIS: NORMAL
GROSS: NORMAL

## 2020-03-05 NOTE — TELEPHONE ENCOUNTER
----- Message from Mike Maria sent at 3/5/2020  9:40 AM CST -----  Contact: Nathan  Type: Needs Medical Advice    Who Called:  Nathan with [facility]  Symptoms (please be specific):    How long has patient had these symptoms:    Pharmacy name and phone #:    Best Call Back Number: [phone]  Additional Information: called to advise that patient has been enroll into case management program, if any questions call back

## 2020-03-12 ENCOUNTER — OFFICE VISIT (OUTPATIENT)
Dept: BARIATRICS | Facility: CLINIC | Age: 55
End: 2020-03-12
Payer: COMMERCIAL

## 2020-03-12 VITALS
RESPIRATION RATE: 16 BRPM | BODY MASS INDEX: 33.85 KG/M2 | HEART RATE: 87 BPM | SYSTOLIC BLOOD PRESSURE: 138 MMHG | WEIGHT: 203.19 LBS | DIASTOLIC BLOOD PRESSURE: 78 MMHG | TEMPERATURE: 99 F | HEIGHT: 65 IN

## 2020-03-12 DIAGNOSIS — G47.33 OSA (OBSTRUCTIVE SLEEP APNEA): ICD-10-CM

## 2020-03-12 DIAGNOSIS — E66.01 CLASS 2 SEVERE OBESITY DUE TO EXCESS CALORIES WITH SERIOUS COMORBIDITY AND BODY MASS INDEX (BMI) OF 35.0 TO 35.9 IN ADULT: ICD-10-CM

## 2020-03-12 DIAGNOSIS — E66.01 MORBID OBESITY: Primary | ICD-10-CM

## 2020-03-12 PROCEDURE — 99999 PR PBB SHADOW E&M-EST. PATIENT-LVL IV: CPT | Mod: PBBFAC,,, | Performed by: SURGERY

## 2020-03-12 PROCEDURE — 99024 PR POST-OP FOLLOW-UP VISIT: ICD-10-PCS | Mod: S$GLB,,, | Performed by: SURGERY

## 2020-03-12 PROCEDURE — 99999 PR PBB SHADOW E&M-EST. PATIENT-LVL IV: ICD-10-PCS | Mod: PBBFAC,,, | Performed by: SURGERY

## 2020-03-12 PROCEDURE — 99024 POSTOP FOLLOW-UP VISIT: CPT | Mod: S$GLB,,, | Performed by: SURGERY

## 2020-03-12 NOTE — PROGRESS NOTES
Post Op Note    Surgery: gastric sleeve surgery  Date: 3/2/20  Initial weight: 220  Last weight: 220  Current weight: 203    Constipation:  None  Reflux:  None  Vomiting:  None    Diet:    2 protein shakes  1/2 cup of creamy soups     Exercise:  Walking some    MVI: liquid mvi, b12, caltrate     Vitals:    03/12/20 1307   BP: 138/78   Pulse: 87   Resp: 16   Temp: 98.5 °F (36.9 °C)       Body comp:  Fat Percent:  50.0 %  Fat Mass:  101.6 lb  FFM:  101.6 lb  TBW: 73 lb  TBW %:  35.9 %  BMR: 1461 kcal    PE:  NAD  RRR  Soft/nt/nd  Incisions: well healed    Labs: none    A/P: s/p sleeve     Counseling for patient:    Diet: continue protocol  Exercise: ok for cardio, no heavy lifting over 30 pounds for another month  Vitamins: 2 mvi daily, calcium, and b complex    Co morbidities:     1. Morbid obesity     2. Class 2 severe obesity due to excess calories with serious comorbidity and body mass index (BMI) of 35.0 to 35.9 in adult     3. ALMA (obstructive sleep apnea)         : I met with the patient for 15 minutes and counseled her for over 50% of that time

## 2020-03-19 ENCOUNTER — PATIENT MESSAGE (OUTPATIENT)
Dept: BARIATRICS | Facility: CLINIC | Age: 55
End: 2020-03-19

## 2020-03-20 ENCOUNTER — PATIENT MESSAGE (OUTPATIENT)
Dept: BARIATRICS | Facility: CLINIC | Age: 55
End: 2020-03-20

## 2020-03-23 ENCOUNTER — PATIENT MESSAGE (OUTPATIENT)
Dept: BARIATRICS | Facility: CLINIC | Age: 55
End: 2020-03-23

## 2020-03-23 RX ORDER — DIAZEPAM 2 MG/1
2 TABLET ORAL EVERY 6 HOURS PRN
Qty: 20 TABLET | Refills: 0 | Status: SHIPPED | OUTPATIENT
Start: 2020-03-23 | End: 2020-04-22

## 2020-03-30 ENCOUNTER — TELEPHONE (OUTPATIENT)
Dept: PHARMACY | Facility: CLINIC | Age: 55
End: 2020-03-30

## 2020-04-02 ENCOUNTER — TELEPHONE (OUTPATIENT)
Dept: NEUROLOGY | Facility: CLINIC | Age: 55
End: 2020-04-02

## 2020-04-15 ENCOUNTER — OFFICE VISIT (OUTPATIENT)
Dept: BARIATRICS | Facility: CLINIC | Age: 55
End: 2020-04-15
Payer: COMMERCIAL

## 2020-04-15 DIAGNOSIS — E66.01 MORBID OBESITY: Primary | ICD-10-CM

## 2020-04-15 DIAGNOSIS — G47.33 OSA (OBSTRUCTIVE SLEEP APNEA): ICD-10-CM

## 2020-04-15 PROCEDURE — 99024 PR POST-OP FOLLOW-UP VISIT: ICD-10-PCS | Mod: 95,,, | Performed by: SURGERY

## 2020-04-15 PROCEDURE — 99024 POSTOP FOLLOW-UP VISIT: CPT | Mod: 95,,, | Performed by: SURGERY

## 2020-04-16 NOTE — PROGRESS NOTES
The patient location is: home  The chief complaint leading to consultation is: sp bariatric surgery  Visit type: audiovisual  Total time spent with patient: 10 min  Each patient to whom he or she provides medical services by telemedicine is:  (1) informed of the relationship between the physician and patient and the respective role of any other health care provider with respect to management of the patient; and (2) notified that he or she may decline to receive medical services by telemedicine and may withdraw from such care at any time.    Notes:     Surgery: gastric sleeve surgery  Date: 3/2/20  Initial weight: 220  Last weight: 203  Current weight: 190s    No complaints  Following low carb dieting    Some exercising    Taking vitamins    Sp sleeve  Doing well  Clear to do any exercising she wants  Stick to low carb dieting    2 mvi, calcium, b12 daily

## 2020-04-27 ENCOUNTER — OFFICE VISIT (OUTPATIENT)
Dept: NEUROLOGY | Facility: CLINIC | Age: 55
End: 2020-04-27
Payer: COMMERCIAL

## 2020-04-27 DIAGNOSIS — M54.81 BILATERAL OCCIPITAL NEURALGIA: ICD-10-CM

## 2020-04-27 DIAGNOSIS — R40.0 SOMNOLENCE: ICD-10-CM

## 2020-04-27 DIAGNOSIS — G47.33 OSA (OBSTRUCTIVE SLEEP APNEA): ICD-10-CM

## 2020-04-27 PROCEDURE — 99213 PR OFFICE/OUTPT VISIT, EST, LEVL III, 20-29 MIN: ICD-10-PCS | Mod: 95,,, | Performed by: PSYCHIATRY & NEUROLOGY

## 2020-04-27 PROCEDURE — 99213 OFFICE O/P EST LOW 20 MIN: CPT | Mod: 95,,, | Performed by: PSYCHIATRY & NEUROLOGY

## 2020-04-27 NOTE — PROGRESS NOTES
Subjective:       Patient ID: Marsha Khalil is a 54 y.o. female.    Chief Complaint: Headaches   The patient location is: Home  The chief complaint leading to consultation is: Migraine  Visit type: Virtual visit with synchronous audio and video  Total time spent with patient: 15 minutes  The patient was informed of the relationship between the physician and patient and notified that he or she may decline to receive medical services by telemedicine and may withdraw from such care at any time.    INTERVAL HISTORY 4//27/2020  The presents for virtual video follow up. She is doing very well. Emgality put her in remission and she does not report either migraine or tension type headache. Both, Imitrex 50 mg tab and Fioricet worked well in the past. Not needed either recently.     INTERVAL HISTORY 1/6/2020  The patient presents for follow up. She is still doing well on Emgality, she reports only one migraine since on Emgality plus tobacco cessation. She is scheduled for gastric sleeve in March.  She is very please with her current protocol.       INTERVAL HISTORY 7/18/2019  The patient presents for follow up. She has finally found relief with Emgality, she reports up to 14 days headache free which is unprecedented for her. She is averaging 2 headache attacks per month which are severe but short lasting and respond well to medication. She is very please with her current protocol.     INTERVAL HISTORY 5/2/2019  The patient is a 54 y/o female presenting for follow up regarding migraine with and without aura. She has not been doing well. She initially responded to Aimovig but it quickly lost efficacy. She used 140 mg dose. She reports a variety of new symptoms which are unexplained including poor balance, falls, cognitive deficit, anger bursts at work. She is concerned because her sister was givena diagnosis of early stages of Alzheimer's disease, she is in the late 50's. Her mother had Alzheimer's as well, diagnosed in the  early 60's. She is also very depressed. She cries during most of the interview.    INTERVAL HISTORY  The patient presents for follow up regarding migraine headache with and without aura. She was doing very well since placed on prevention with Lamictal, but for the last 4 weeks, she has had a constant, severe headache, not responding to Imitrex. Stress is a big trigger. Her daughter was just diagnosed with a pineal cyst and a small acoustic schwanoma. Otherwise information below is still accurate and current.    HPI  The patient is a pleasant 52 y/o female presenting with chief complaint of headache. They started after the birth of her last child 18 years ago. The location varies and migrates from one side to another, posterior or anterior or pancephalic. She has never taken preventive medication or migraine specific medications. She treats with tylenol or NSAIDs with partial relief. The duration varied from 3 to 4 hours to 3 to 4 days. She is more worried about episodes consisting of scintillating scotoma, described as a typical aura followed by a severe headache. She has had an ECHO in the past but not with bubble contrast. She has had a recent MRI of the brain which was negative, revealing microischemic changes as expected for her age. Her father suffered from MS. She has undergone extensive work up for MS with negative results.  Please see details of headache characteristics below.  Headache questionnaire     1. When did your Headaches start?                         1999 with the birth of last child        2. Where are your headaches located?                        Both sides around the back of neck         3. Your headache's characteristics:                        Excruciating, Pressure, Throbbing, Pounding, Sharp        4. How long does the headache last?                        days        5. How often does the headache occur?                        0 varies         6. Are your headaches preceded or accompanied  by other symptoms? yes                        If yes, please describe.  Blurry vision kaleidoscope affect , peripheral vision disappears         7. Does the headache awaken you at night? yes                        If so, how often? Once or twice per month            8. Please bev the word that best describes your headache's intensity:                         severe        9. Using a scale of 1 through 10, with 0 = no pain and 10 = the worst pain:                        What score is your headache now? 2                        What score is your headache at its worst? 10                        What score is your headache at its best? 2           10. Possible associated headache symptoms:  [x]  Sensitivity to light                                      [] Dizziness                                        [] Nasal or sinus pressure/ pain                        [x] Sensitivity to noise                                     [] Vertigo                                            [x] Problems with concentration  [] Sensitivity to smells             [] Ringing in ears                     [] Problems with memory                                            [x] Blurred vision                                 [x] Irritability                                         [x] Problems with task completion   [] Double vision                                 [] Anger                                  [x]  Problems with relaxation  [] Loss of appetite                                         [] Anxiety                                           [x] Neck tightness, Neck pain  [x] Nausea                                                                 [] Nasal congestion  [] Vomiting                                                                       11. Headache improving factors:  [] Sleep                                  [x] Heat  [x] Darkness                                        [x] Ice  [] Local pressure                     [] Menses  (period)  [x] Massage                                        [x] Medications:  advil and tylenol        12. Headache worsening factors:   [] Fatigue                     [] Sneezing                                        [x] Changes in Weather  [x] Light             [x] Bending Over           [x] Stress  [] Noise            [] Ovulation                                        [] Multiple Sclerosis Flare-Up  [] Smells                      [] Menses                                          [] Food   [x] Coughing                  [] Alcohol        13. Number of caffeinated drinks per day: 2        14. Number of diet drinks per day:  0     Review of Systems   Constitutional: Positive for fatigue. Negative for activity change, appetite change and fever.   HENT: Negative for congestion, dental problem, hearing loss, sinus pressure, tinnitus, trouble swallowing and voice change.    Eyes: Negative for photophobia, pain, redness and visual disturbance (visual aura).   Respiratory: Negative for cough, chest tightness and shortness of breath.    Cardiovascular: Negative for chest pain, palpitations and leg swelling.   Gastrointestinal: Positive for nausea. Negative for abdominal pain, blood in stool and vomiting.   Endocrine: Positive for cold intolerance. Negative for heat intolerance.   Genitourinary: Negative for difficulty urinating, frequency, menstrual problem and urgency.   Musculoskeletal: Negative for arthralgias, back pain, gait problem, joint swelling, myalgias, neck pain and neck stiffness.   Skin: Negative.    Neurological: Negative for dizziness, tremors, seizures, syncope, facial asymmetry, speech difficulty, weakness, light-headedness, numbness and headaches.   Hematological: Negative for adenopathy. Does not bruise/bleed easily.   Psychiatric/Behavioral: Negative for agitation, behavioral problems, confusion, decreased concentration, self-injury, sleep disturbance and suicidal ideas. The patient is not  nervous/anxious and is not hyperactive.          Past Medical History:   Diagnosis Date    Allergy     Chest pain     negative stress test and heart cath    Headache     ALMA (obstructive sleep apnea)     Seasonal allergies      Past Surgical History:   Procedure Laterality Date    abscess removal from right side of neck      APPENDECTOMY      BREAST SURGERY Left     lumpectomy, benign    CARDIAC CATHETERIZATION      part of evaluation for chest pain, negative     SECTION      x1    ENDOMETRIAL ABLATION      TUBAL LIGATION       Family History   Problem Relation Age of Onset    Multiple sclerosis Father     Diabetes Father     Heart disease Father 50     CAD    Diabetes Mother     Cancer Mother      breast    Heart disease Mother 50     CAD    Thyroid disease Daughter      hypothyroidism    Transient ischemic attack Sister      Social History     Social History    Marital status: Significant Other     Spouse name: N/A    Number of children: N/A    Years of education: N/A     Occupational History    Not on file.     Social History Main Topics    Smoking status: Current Every Day Smoker     Packs/day: 1.00     Years: 30.00     Types: Cigarettes    Smokeless tobacco: Never Used    Alcohol use No    Drug use: No    Sexual activity: Yes     Partners: Male     Other Topics Concern    Not on file     Social History Narrative    No narrative on file     Review of patient's allergies indicates:  No Known Allergies  Current Outpatient Medications   Medication Sig    butalbital-acetaminop-caf-cod -46-30 mg Cap Take 1 capsule by mouth every 6 (six) hours as needed.    cetirizine (ZYRTEC) 10 MG tablet Take 10 mg by mouth once daily.    diazePAM (VALIUM) 2 MG tablet Take 1 tablet (2 mg total) by mouth every 6 (six) hours as needed for Anxiety (muscle spasm).    galcanezumab-gnlm (EMGALITY PEN) 120 mg/mL PnIj Inject 120 mg into the skin every 28 days.    HYDROcodone-acetaminophen  (NORCO) 7.5-325 mg per tablet Take 1 tablet by mouth every 4 (four) hours as needed for Pain. (Patient not taking: Reported on 3/12/2020)    multivitamin capsule Take 1 capsule by mouth once daily.    omeprazole (PRILOSEC) 20 MG capsule Take 1 capsule (20 mg total) by mouth once daily.    ondansetron (ZOFRAN-ODT) 4 MG TbDL Take 1 tablet (4 mg total) by mouth every 6 (six) hours as needed. (Patient not taking: Reported on 3/12/2020)    sumatriptan (IMITREX) 50 MG tablet TAKE 1 TABLET AT ONSET OF HEADACHE MAY REPEAT IN 2 HOURS TO A MAX OF 3 PER DAY 2 DAYS PER WEEK     No current facility-administered medications for this visit.          Objective:      Neurological Exam:  General: well-developed, well-nourished, no distress  Mental status: Awake and alert  Speech language: No dysarthria or aphasia on conversation  Cranial nerves: Face symmetric  Motor: Moves all extremities well  Coordination: No ataxia. No tremor.     Review of Data:    Lab Results   Component Value Date    BNP 10 01/03/2019     10/14/2019    K 4.6 10/14/2019    MG 2.0 10/14/2019     10/14/2019    CO2 27 10/14/2019    BUN 13 10/14/2019    CREATININE 0.8 10/14/2019     10/14/2019    HGBA1C 5.6 10/14/2019    AST 14 10/14/2019    ALT 20 10/14/2019    ALBUMIN 4.1 10/14/2019    PROT 7.3 10/14/2019    BILITOT 0.8 10/14/2019    CHOL 196 10/14/2019    HDL 43 10/14/2019    LDLCALC 130.2 10/14/2019    TRIG 114 10/14/2019       Lab Results   Component Value Date    WBC 8.20 10/14/2019    HGB 13.9 10/14/2019    HCT 40.9 10/14/2019    MCV 89 10/14/2019     (H) 10/14/2019       Lab Results   Component Value Date    TSH 0.725 10/14/2019         Results for orders placed or performed during the hospital encounter of 05/24/19   MRI Brain W WO Contrast    Narrative    EXAMINATION:  MRI BRAIN W WO CONTRAST    CLINICAL HISTORY:  imbalance; Somnolence    TECHNIQUE:  Multiplanar multisequence MR imaging of the brain was performed before and  after the administration of 9 mL Gadavist intravenous contrast.    COMPARISON:  None    FINDINGS:  There is no restricted diffusion.  The craniocervical junction is within normal limits in appearance.  There is normal vascular flow void in major, central intracranial vessels.  Ventricles, sulci, fissures are unremarkable in appearance for the patient's age.  There are just very few small scattered foci of increased T2/FLAIR signal in the white matter consistent with very mild microvascular ischemic change not appearing out of proportion for the patient's age and with no corresponding diffusion positivity or abnormal contrast enhancement.  No abnormal contrast enhancement or intracranial mass or mass effect is seen.  Orbital contents are unremarkable in appearance.  There is mild mucosal thickening in ethmoid sinuses.  Mastoids appear well pneumatized      Impression    Slight mucosal thickening ethmoid sinuses.  Very mild/minimal white matter changes suggesting minimal microvascular ischemic change.      Electronically signed by: Za Skinner MD  Date:    05/24/2019  Time:    10:43   Results for orders placed or performed during the hospital encounter of 05/12/17   MRA Brain    Narrative    Technique:Noncontrast 3-D time-of-flight MRA head with review of axial source images and maximum intensity projection reconstructions      Comparison:None available at this institution     Findings:The quality of the study is good. The visualized cervical, petrous, cavernous and supraclinoid internal carotid arteries are unremarkable. The bilateral anterior cerebral and anterior communicating arteries are unremarkable. The bilateral middle cerebral arteries are patent and symmetric without evidence for stenosis.    The vertebral arteries are codominant. No vertebral artery stenosis or dissection is seen. The visualized inferior and superior cerebellar arteries are unremarkable. The basilar artery is unremarkable. The bilateral  posterior cerebral arteries are unremarkable. There is a small, patent right posterior communicating artery. The left posterior communicating artery appears to be small or absent.    No intracranial aneurysm is identified.    Impression    1. Normal MRA of the brain. Specifically, no identifiable intracranial aneurysm is seen. There is reportedly a previous diagnosis of a 1 mm aneurysm which would be difficult to reliably diagnose by MRA.    Epic notification system activated.      Electronically signed by: Elizabeth Thayer MD  Date:     05/12/17  Time:    16:58          Assessment and Plan   Migraine with aura manifesting with scintillating scotoma. Excellent response to Emgality while Aimovig was poorly tolerated  Discontinue Emgality, currently in remission  For the acute attack, sumatriptan as first line and limited Fiorinal for rescue.  Seasonal allergies  ALMA  I have discussed the side effects of the medications prescribed and the patient acknowledges understanding    RTC in 3 months  Counseling:  More than 50% of the 15 minute encounter was spent face to face counseling the patient regarding current status and future plan of care as well as side of the medications. All questions were answered to patient's satisfaction          Florentin Fischer M.D  Medical Director, Headache and Facial Pain  [facility]

## 2020-04-29 ENCOUNTER — TELEPHONE (OUTPATIENT)
Dept: PHARMACY | Facility: CLINIC | Age: 55
End: 2020-04-29

## 2020-04-29 NOTE — TELEPHONE ENCOUNTER
RX call attempt 1 regarding Emgality refill from [facility]. Patient was not reached, left voicemail. Copay $0.00.

## 2020-05-05 ENCOUNTER — TELEPHONE (OUTPATIENT)
Dept: PHARMACY | Facility: CLINIC | Age: 55
End: 2020-05-05

## 2020-05-18 ENCOUNTER — PATIENT OUTREACH (OUTPATIENT)
Dept: ADMINISTRATIVE | Facility: HOSPITAL | Age: 55
End: 2020-05-18

## 2020-05-18 NOTE — PROGRESS NOTES
Chart review completed 05/18/2020.  Care Everywhere updates requested and reviewed.  Immunizations reconciled. Media reviewed.     Letter mailed. Portal Message Sent.

## 2020-05-28 ENCOUNTER — TELEPHONE (OUTPATIENT)
Dept: PHARMACY | Facility: CLINIC | Age: 55
End: 2020-05-28

## 2020-06-03 ENCOUNTER — TELEPHONE (OUTPATIENT)
Dept: PHARMACY | Facility: CLINIC | Age: 55
End: 2020-06-03

## 2020-06-03 NOTE — TELEPHONE ENCOUNTER
During a routine refill call the patient stated that she will no longer be taking Emgality. She has stopped smoking and reports that she has been migraine/headache free.     Provider notified via [facility] on 6/3    Indra Crane, SreedharD  Clinical Pharmacist  [facility]  P: [phone]

## 2020-06-10 ENCOUNTER — OFFICE VISIT (OUTPATIENT)
Dept: BARIATRICS | Facility: CLINIC | Age: 55
End: 2020-06-10
Payer: COMMERCIAL

## 2020-06-10 VITALS
DIASTOLIC BLOOD PRESSURE: 91 MMHG | BODY MASS INDEX: 29.79 KG/M2 | SYSTOLIC BLOOD PRESSURE: 133 MMHG | TEMPERATURE: 98 F | WEIGHT: 178.81 LBS | HEART RATE: 76 BPM | HEIGHT: 65 IN | RESPIRATION RATE: 16 BRPM

## 2020-06-10 DIAGNOSIS — E66.01 MORBID OBESITY: Primary | ICD-10-CM

## 2020-06-10 DIAGNOSIS — M51.36 DDD (DEGENERATIVE DISC DISEASE), LUMBAR: ICD-10-CM

## 2020-06-10 DIAGNOSIS — G47.33 OSA (OBSTRUCTIVE SLEEP APNEA): ICD-10-CM

## 2020-06-10 PROCEDURE — 99213 OFFICE O/P EST LOW 20 MIN: CPT | Mod: S$GLB,,, | Performed by: SURGERY

## 2020-06-10 PROCEDURE — 99999 PR PBB SHADOW E&M-EST. PATIENT-LVL IV: ICD-10-PCS | Mod: PBBFAC,,, | Performed by: SURGERY

## 2020-06-10 PROCEDURE — 99999 PR PBB SHADOW E&M-EST. PATIENT-LVL IV: CPT | Mod: PBBFAC,,, | Performed by: SURGERY

## 2020-06-10 PROCEDURE — 3008F BODY MASS INDEX DOCD: CPT | Mod: CPTII,S$GLB,, | Performed by: SURGERY

## 2020-06-10 PROCEDURE — 99213 PR OFFICE/OUTPT VISIT, EST, LEVL III, 20-29 MIN: ICD-10-PCS | Mod: S$GLB,,, | Performed by: SURGERY

## 2020-06-10 PROCEDURE — 3008F PR BODY MASS INDEX (BMI) DOCUMENTED: ICD-10-PCS | Mod: CPTII,S$GLB,, | Performed by: SURGERY

## 2020-06-10 NOTE — PROGRESS NOTES
Post Op Note    Surgery: gastric sleeve surgery  Date: 3/2/20  Initial weight: 220  Last weight: 190  Current weight: 178    Constipation: none  Reflux: none  Vomiting: none    Diet:  Breakfast:  eggs  Lunch: ham and cheese  Dinner: meat and vegetables    Exercise:  Home gym- daily    MVI: occasionally misses vitamins- does mvi daily     Vitals:    06/10/20 1428   BP: (!) 133/91   Pulse: 76   Resp: 16   Temp: 98.4 °F (36.9 °C)       Body comp:  Fat Percent:  45 %  Fat Mass:  80.4 lb  FFM:  98.4 lb  TBW: 69.6 lb  TBW %:  38.9 %  BMR: 1387 kcal    PE:  NAD  RRR  Soft/nt/nd  Incisions: well healed    Labs: pending    A/P: s/p sleeve      Counseling for patient:    Diet:  Continue current diet plan  Exercise:  At least 3 times per week for 20 min  Vitamins:  Continue regimen  Co morbidities:     1. Morbid obesity     2. ALMA (obstructive sleep apnea)     3. DDD (degenerative disc disease), lumbar         -All above: Evaluated, monitored, and treated with diet and exercise program.        : I met with the patient for 15 minutes and counseled her for over 50% of that time    
Detail Level: Detailed
Add 17904 Cpt? (Important Note: In 2017 The Use Of 34019 Is Being Tracked By Cms To Determine Future Global Period Reimbursement For Global Periods): no

## 2020-10-20 NOTE — OR NURSING
Preop complete. Family at bedside. Text notifications initiated. Pt states that she is comfortable. Denies needs at this time.    no rashes , no suspicious lesions , no areas of discoloration

## 2020-12-23 NOTE — TELEPHONE ENCOUNTER
Faxed prior authorization for Emgality 120 mg/ml to insurance company for review on 6/5/2019 10:45am [facility]   No

## 2020-12-24 NOTE — TELEPHONE ENCOUNTER
From: Sarah Cordero  To: Rosita Jj MD  Sent: 12/24/2020 8:12 AM CST  Subject: Prescription Question    Hi Dr. Lashell Rodriguez,    Would therapy be helpful for the right shoulder?  If so, please write an order for the therapist at [address] Patient states that her bandage has a lot of blood on it and is wondering if that is normal.  Advised that she does have a packing in there that allows the wound to drain so it is normal.  She also needs another work note to state that she is ok to do work from home and return to work on Wednesday, September 27.

## 2021-05-30 ENCOUNTER — HOSPITAL ENCOUNTER (OUTPATIENT)
Dept: HOSPITAL 53 - M RAD | Age: 56
End: 2021-05-30
Attending: ORTHOPAEDIC SURGERY
Payer: COMMERCIAL

## 2021-05-30 DIAGNOSIS — M47.896: Primary | ICD-10-CM

## 2021-05-30 NOTE — REPVR
PROCEDURE INFORMATION: 

Exam: MR Pelvis Without Contrast, Sacrum 

Exam date and time: 5/30/2021 11:10 AM 

Age: 55 years old 

Clinical indication: Pain; Other: Spondylosis, lumbar sameer, R/O sacral spinal 

stenosis 



TECHNIQUE: 

Imaging protocol: Magnetic resonance images of the pelvis without intravenous 

contrast. Exam focused on the sacrum. 



COMPARISON: 

No relevant prior studies available. 



FINDINGS: 

Bones/joints: There is a transitional lumbosacral vertebra most consistent with 

a partially lumbarlised S1. There is a diminutive S1-S2 disc space. There is a 

small bulge with an annular fissure without mass effect. There are mild facet 

joint degenerative changes. The central canal neural are compromised. 

The sacroiliac joints are normally aligned.  There is no fluid or significant 

degenerative changes the SI joints.

Soft tissues: Unremarkable. 



IMPRESSION: 

There is no central stenosis in the sacral spine 



Electronically signed by: Sid Tee On 05/30/2021  12:20:07 PM

## (undated) DEVICE — SLEEVE SCD EXPRESS CALF MEDIUM

## (undated) DEVICE — SYR SLIP TIP 10ML SHIELD

## (undated) DEVICE — SYR 30CC LUER LOCK

## (undated) DEVICE — SHELL POWER SIGNIA

## (undated) DEVICE — SET TUBE PNEUMOCLEAR SE HI FLO

## (undated) DEVICE — TROCAR KII FIOS 5MM X 100MM

## (undated) DEVICE — BLADE SURG CARBON STEEL SZ11

## (undated) DEVICE — SEE MEDLINE ITEM 152680

## (undated) DEVICE — PACK CUSTOM UNIV BASIN SLI

## (undated) DEVICE — SEE MEDLINE ITEM 157117

## (undated) DEVICE — SUT MONOCRYL 4-0 SH UND MON

## (undated) DEVICE — STRAP OR TABLE 5IN X 72IN

## (undated) DEVICE — STAPLE REINF INTLLGNT RELOAD

## (undated) DEVICE — TROCAR VERSAONE OPT FACE CLSR

## (undated) DEVICE — SOL IRR NACL .9% 3000ML

## (undated) DEVICE — NDL SPINAL 18GX3.5 SPINOCAN

## (undated) DEVICE — DRAPE ABDOMINAL TIBURON 14X11

## (undated) DEVICE — BANDAGE ADHESIVE

## (undated) DEVICE — TRANSFER MATTRES LATERAL 34

## (undated) DEVICE — SOL 9P NACL IRR PIC IL

## (undated) DEVICE — GLOVE SURG ULTRA TOUCH 7.5

## (undated) DEVICE — SEE MEDLINE ITEM 152622

## (undated) DEVICE — APPLICATOR CHLORAPREP ORN 26ML

## (undated) DEVICE — SYR 50CC LL

## (undated) DEVICE — CLOSURE SKIN STERI STRIP 1/2X4

## (undated) DEVICE — SOL CLEARIFY VISUALIZATION LAP

## (undated) DEVICE — CANISTER SUCTION 3000CC

## (undated) DEVICE — APPLIER CLIP EPIX UNIV 5X34

## (undated) DEVICE — SLEEVE GSTRCTMY VISIGI 3D 36FR

## (undated) DEVICE — TROCAR KII FIOS 12MM X 100MM

## (undated) DEVICE — GLOVE SURGEONS ULTRA TOUCH 6.5

## (undated) DEVICE — SCISSOR 5MMX35CM DIRECT DRIVE

## (undated) DEVICE — IRRIGATOR SUCTION W/TIP

## (undated) DEVICE — GLOVE SURG ULTRA TOUCH 7

## (undated) DEVICE — SUT 0 VICRYL / UR6 (J603)

## (undated) DEVICE — LINER SUCTION 3000CC

## (undated) DEVICE — UNDERGLOVE BIOGEL PI SZ 6.5 LF

## (undated) DEVICE — SEE MEDLINE ITEM 157116

## (undated) DEVICE — SEE MEDLINE ITEM 146292

## (undated) DEVICE — STAPLER ENDO GIA 60MM MED THCK

## (undated) DEVICE — UNDERGLOVES BIOGEL PI SZ 7 LF

## (undated) DEVICE — SHEARS HARMONIC 36CM HD 1000I

## (undated) DEVICE — ELECTRODE REM PLYHSV RETURN 9